# Patient Record
Sex: MALE | Race: WHITE | NOT HISPANIC OR LATINO
[De-identification: names, ages, dates, MRNs, and addresses within clinical notes are randomized per-mention and may not be internally consistent; named-entity substitution may affect disease eponyms.]

---

## 2017-01-11 ENCOUNTER — APPOINTMENT (OUTPATIENT)
Dept: INTERNAL MEDICINE | Facility: CLINIC | Age: 82
End: 2017-01-11

## 2017-01-11 VITALS
WEIGHT: 167 LBS | HEART RATE: 101 BPM | TEMPERATURE: 98.1 F | DIASTOLIC BLOOD PRESSURE: 77 MMHG | SYSTOLIC BLOOD PRESSURE: 116 MMHG | HEIGHT: 65 IN | BODY MASS INDEX: 27.82 KG/M2

## 2017-01-12 ENCOUNTER — INPATIENT (INPATIENT)
Facility: HOSPITAL | Age: 82
LOS: 2 days | Discharge: HOME | DRG: 195 | End: 2017-01-15
Attending: INTERNAL MEDICINE | Admitting: HOSPITALIST
Payer: MEDICARE

## 2017-01-12 ENCOUNTER — RESULT CHARGE (OUTPATIENT)
Age: 82
End: 2017-01-12

## 2017-01-12 VITALS
TEMPERATURE: 102 F | DIASTOLIC BLOOD PRESSURE: 78 MMHG | HEART RATE: 98 BPM | OXYGEN SATURATION: 92 % | RESPIRATION RATE: 22 BRPM | SYSTOLIC BLOOD PRESSURE: 125 MMHG

## 2017-01-12 DIAGNOSIS — J18.9 PNEUMONIA, UNSPECIFIED ORGANISM: ICD-10-CM

## 2017-01-12 DIAGNOSIS — Z98.41 CATARACT EXTRACTION STATUS, RIGHT EYE: Chronic | ICD-10-CM

## 2017-01-12 LAB
ALBUMIN SERPL ELPH-MCNC: 3.6 G/DL — SIGNIFICANT CHANGE UP (ref 3.3–5)
ALP SERPL-CCNC: 70 U/L — SIGNIFICANT CHANGE UP (ref 40–120)
ALT FLD-CCNC: 30 U/L RC — SIGNIFICANT CHANGE UP (ref 10–45)
ANION GAP SERPL CALC-SCNC: 18 MMOL/L — HIGH (ref 5–17)
APTT BLD: 27.3 SEC — LOW (ref 27.5–37.4)
AST SERPL-CCNC: 42 U/L — HIGH (ref 10–40)
BASOPHILS # BLD AUTO: 0 K/UL — SIGNIFICANT CHANGE UP (ref 0–0.2)
BASOPHILS NFR BLD AUTO: 0 % — SIGNIFICANT CHANGE UP (ref 0–2)
BILIRUB SERPL-MCNC: 1.1 MG/DL — SIGNIFICANT CHANGE UP (ref 0.2–1.2)
BUN SERPL-MCNC: 23 MG/DL — SIGNIFICANT CHANGE UP (ref 7–23)
CALCIUM SERPL-MCNC: 9.3 MG/DL — SIGNIFICANT CHANGE UP (ref 8.4–10.5)
CHLORIDE SERPL-SCNC: 100 MMOL/L — SIGNIFICANT CHANGE UP (ref 96–108)
CO2 SERPL-SCNC: 21 MMOL/L — LOW (ref 22–31)
CREAT SERPL-MCNC: 1.05 MG/DL — SIGNIFICANT CHANGE UP (ref 0.5–1.3)
EOSINOPHIL # BLD AUTO: 0 K/UL — SIGNIFICANT CHANGE UP (ref 0–0.5)
EOSINOPHIL NFR BLD AUTO: 0.3 % — SIGNIFICANT CHANGE UP (ref 0–6)
FLUAV SPEC QL CULT: NEGATIVE
FLUBV AG SPEC QL IA: NEGATIVE
GAS PNL BLDV: SIGNIFICANT CHANGE UP
GLUCOSE SERPL-MCNC: 109 MG/DL — HIGH (ref 70–99)
HCT VFR BLD CALC: 33.9 % — LOW (ref 39–50)
HGB BLD-MCNC: 11.5 G/DL — LOW (ref 13–17)
INR BLD: 1.22 RATIO — HIGH (ref 0.88–1.16)
LYMPHOCYTES # BLD AUTO: 1 K/UL — SIGNIFICANT CHANGE UP (ref 1–3.3)
LYMPHOCYTES # BLD AUTO: 14.2 % — SIGNIFICANT CHANGE UP (ref 13–44)
MCHC RBC-ENTMCNC: 31.8 PG — SIGNIFICANT CHANGE UP (ref 27–34)
MCHC RBC-ENTMCNC: 33.8 GM/DL — SIGNIFICANT CHANGE UP (ref 32–36)
MCV RBC AUTO: 94.1 FL — SIGNIFICANT CHANGE UP (ref 80–100)
MONOCYTES # BLD AUTO: 0.6 K/UL — SIGNIFICANT CHANGE UP (ref 0–0.9)
MONOCYTES NFR BLD AUTO: 8.6 % — SIGNIFICANT CHANGE UP (ref 2–14)
NEUTROPHILS # BLD AUTO: 5.4 K/UL — SIGNIFICANT CHANGE UP (ref 1.8–7.4)
NEUTROPHILS NFR BLD AUTO: 76.8 % — SIGNIFICANT CHANGE UP (ref 43–77)
PLATELET # BLD AUTO: 166 K/UL — SIGNIFICANT CHANGE UP (ref 150–400)
POTASSIUM SERPL-MCNC: 4 MMOL/L — SIGNIFICANT CHANGE UP (ref 3.5–5.3)
POTASSIUM SERPL-SCNC: 4 MMOL/L — SIGNIFICANT CHANGE UP (ref 3.5–5.3)
PROT SERPL-MCNC: 7.7 G/DL — SIGNIFICANT CHANGE UP (ref 6–8.3)
PROTHROM AB SERPL-ACNC: 13.3 SEC — HIGH (ref 10–13.1)
RAPID RVP RESULT: SIGNIFICANT CHANGE UP
RBC # BLD: 3.61 M/UL — LOW (ref 4.2–5.8)
RBC # FLD: 12.4 % — SIGNIFICANT CHANGE UP (ref 10.3–14.5)
S PYO AG SPEC QL IA: NEGATIVE
SODIUM SERPL-SCNC: 139 MMOL/L — SIGNIFICANT CHANGE UP (ref 135–145)
WBC # BLD: 7 K/UL — SIGNIFICANT CHANGE UP (ref 3.8–10.5)
WBC # FLD AUTO: 7 K/UL — SIGNIFICANT CHANGE UP (ref 3.8–10.5)

## 2017-01-12 PROCEDURE — 93010 ELECTROCARDIOGRAM REPORT: CPT

## 2017-01-12 PROCEDURE — 73630 X-RAY EXAM OF FOOT: CPT | Mod: 26,RT

## 2017-01-12 PROCEDURE — 71010: CPT | Mod: 26

## 2017-01-12 PROCEDURE — 70450 CT HEAD/BRAIN W/O DYE: CPT | Mod: 26

## 2017-01-12 PROCEDURE — 99284 EMERGENCY DEPT VISIT MOD MDM: CPT | Mod: 25,GC

## 2017-01-12 RX ORDER — ATORVASTATIN CALCIUM 80 MG/1
10 TABLET, FILM COATED ORAL AT BEDTIME
Qty: 0 | Refills: 0 | Status: DISCONTINUED | OUTPATIENT
Start: 2017-01-12 | End: 2017-01-15

## 2017-01-12 RX ORDER — ASPIRIN/CALCIUM CARB/MAGNESIUM 324 MG
81 TABLET ORAL DAILY
Qty: 0 | Refills: 0 | Status: DISCONTINUED | OUTPATIENT
Start: 2017-01-12 | End: 2017-01-15

## 2017-01-12 RX ORDER — SODIUM CHLORIDE 9 MG/ML
2000 INJECTION INTRAMUSCULAR; INTRAVENOUS; SUBCUTANEOUS ONCE
Qty: 0 | Refills: 0 | Status: COMPLETED | OUTPATIENT
Start: 2017-01-12 | End: 2017-01-12

## 2017-01-12 RX ORDER — AZTREONAM 2 G
2000 VIAL (EA) INJECTION ONCE
Qty: 0 | Refills: 0 | Status: COMPLETED | OUTPATIENT
Start: 2017-01-12 | End: 2017-01-12

## 2017-01-12 RX ORDER — AZITHROMYCIN 500 MG/1
500 TABLET, FILM COATED ORAL ONCE
Qty: 0 | Refills: 0 | Status: COMPLETED | OUTPATIENT
Start: 2017-01-12 | End: 2017-01-12

## 2017-01-12 RX ORDER — ACETAMINOPHEN 500 MG
650 TABLET ORAL ONCE
Qty: 0 | Refills: 0 | Status: COMPLETED | OUTPATIENT
Start: 2017-01-12 | End: 2017-01-12

## 2017-01-12 RX ORDER — SODIUM CHLORIDE 9 MG/ML
3 INJECTION INTRAMUSCULAR; INTRAVENOUS; SUBCUTANEOUS ONCE
Qty: 0 | Refills: 0 | Status: COMPLETED | OUTPATIENT
Start: 2017-01-12 | End: 2017-01-12

## 2017-01-12 RX ORDER — FINASTERIDE 5 MG/1
5 TABLET, FILM COATED ORAL DAILY
Qty: 0 | Refills: 0 | Status: DISCONTINUED | OUTPATIENT
Start: 2017-01-12 | End: 2017-01-15

## 2017-01-12 RX ORDER — VALSARTAN 80 MG/1
160 TABLET ORAL DAILY
Qty: 0 | Refills: 0 | Status: DISCONTINUED | OUTPATIENT
Start: 2017-01-12 | End: 2017-01-15

## 2017-01-12 RX ADMIN — SODIUM CHLORIDE 3 MILLILITER(S): 9 INJECTION INTRAMUSCULAR; INTRAVENOUS; SUBCUTANEOUS at 19:04

## 2017-01-12 RX ADMIN — AZITHROMYCIN 250 MILLIGRAM(S): 500 TABLET, FILM COATED ORAL at 23:54

## 2017-01-12 RX ADMIN — Medication 650 MILLIGRAM(S): at 18:54

## 2017-01-12 RX ADMIN — SODIUM CHLORIDE 1000 MILLILITER(S): 9 INJECTION INTRAMUSCULAR; INTRAVENOUS; SUBCUTANEOUS at 19:04

## 2017-01-12 NOTE — ED ADULT NURSE NOTE - OBJECTIVE STATEMENT
89 y.o male c c/o weakness. Productive cough/ fatigue/ fever x 5 days. Positive nasal congestion. Flu swab neg at MDs office the other day as per pt. Fell getting OOB today. Denies NVD. Denies CP/SOB. Febrile upon arrival. A&Ox2. 89 y.o male c c/o weakness. Productive cough/ fatigue/ fever x 5 days. Positive nasal congestion. Flu swab neg at MDs office the other day as per pt. Fell getting OOB today. Denies NVD. Denies CP/SOB. Febrile upon arrival. A&Ox2. Adventitious B/S B/L.

## 2017-01-12 NOTE — ED ADULT NURSE NOTE - PMH
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

## 2017-01-12 NOTE — ED PROVIDER NOTE - OBJECTIVE STATEMENT
89M with pmh of HTN, HLD presents with productive cough, fatigue, fever x 5 days. with worsening fatigue tonight, unable to get self out of bed. +nasal congestion. this morning had fall while trying to get out of bed, struck nose, denies LOC. no n/v/d. has been taking aspirin for fever. no cp or sob. lives with wife.     was seen by pmd yesterday, prescribed nasal spray and antihistamine.     pmd: shahbaz

## 2017-01-12 NOTE — ED PROVIDER NOTE - MEDICAL DECISION MAKING DETAILS
Attending MD Sanchez: 89 male with HTN presents with fatigue cough and fall today.  Seen by PMD yesterday and give z-pack.  Fell today hitting head and hurting foot.  On exam there is diffuse crackles bilaterally, RRR, abd soft NTNT, no sign LE edema, no resp distress.  No recent hospitalizations.  RVP negative.  Will admit. Denies CP

## 2017-01-12 NOTE — ED PROVIDER NOTE - ATTENDING CONTRIBUTION TO CARE
Attending MD Sanchez:  I personally have seen and examined this patient.  Resident note reviewed and agree on plan of care and except where noted.  See MDM for details.

## 2017-01-12 NOTE — ED PROVIDER NOTE - CARE PLAN
Principal Discharge DX:	Pneumonia of both lungs due to infectious organism, unspecified part of lung

## 2017-01-12 NOTE — ED ADULT NURSE NOTE - PSH
S/P cataract surgery, right    S/P tonsillectomy    S/P TURP (status post transurethral resection of prostate)

## 2017-01-13 ENCOUNTER — TRANSCRIPTION ENCOUNTER (OUTPATIENT)
Age: 82
End: 2017-01-13

## 2017-01-13 DIAGNOSIS — N40.0 BENIGN PROSTATIC HYPERPLASIA WITHOUT LOWER URINARY TRACT SYMPTOMS: ICD-10-CM

## 2017-01-13 DIAGNOSIS — Z29.9 ENCOUNTER FOR PROPHYLACTIC MEASURES, UNSPECIFIED: ICD-10-CM
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
## 2017-01-13 DIAGNOSIS — J18.9 PNEUMONIA, UNSPECIFIED ORGANISM: ICD-10-CM

## 2017-01-13 DIAGNOSIS — I10 ESSENTIAL (PRIMARY) HYPERTENSION: ICD-10-CM

## 2017-01-13 DIAGNOSIS — S92.911A UNSPECIFIED FRACTURE OF RIGHT TOE(S), INITIAL ENCOUNTER FOR CLOSED FRACTURE: ICD-10-CM

## 2017-01-13 LAB
ALBUMIN SERPL ELPH-MCNC: 2.7 G/DL — LOW (ref 3.3–5)
ALP SERPL-CCNC: 64 U/L — SIGNIFICANT CHANGE UP (ref 40–120)
ALT FLD-CCNC: 33 U/L — SIGNIFICANT CHANGE UP (ref 10–45)
ANION GAP SERPL CALC-SCNC: 13 MMOL/L — SIGNIFICANT CHANGE UP (ref 5–17)
APPEARANCE UR: CLEAR — SIGNIFICANT CHANGE UP
AST SERPL-CCNC: 41 U/L — HIGH (ref 10–40)
BASOPHILS # BLD AUTO: 0.01 K/UL — SIGNIFICANT CHANGE UP (ref 0–0.2)
BASOPHILS NFR BLD AUTO: 0.2 % — SIGNIFICANT CHANGE UP (ref 0–2)
BILIRUB SERPL-MCNC: 0.9 MG/DL — SIGNIFICANT CHANGE UP (ref 0.2–1.2)
BILIRUB UR-MCNC: NEGATIVE — SIGNIFICANT CHANGE UP
BUN SERPL-MCNC: 24 MG/DL — HIGH (ref 7–23)
CALCIUM SERPL-MCNC: 8.4 MG/DL — SIGNIFICANT CHANGE UP (ref 8.4–10.5)
CHLORIDE SERPL-SCNC: 105 MMOL/L — SIGNIFICANT CHANGE UP (ref 96–108)
CHOLEST SERPL-MCNC: 92 MG/DL — SIGNIFICANT CHANGE UP (ref 10–199)
CO2 SERPL-SCNC: 21 MMOL/L — LOW (ref 22–31)
COLOR SPEC: YELLOW — SIGNIFICANT CHANGE UP
CREAT SERPL-MCNC: 0.99 MG/DL — SIGNIFICANT CHANGE UP (ref 0.5–1.3)
DIFF PNL FLD: NEGATIVE — SIGNIFICANT CHANGE UP
EOSINOPHIL # BLD AUTO: 0.03 K/UL — SIGNIFICANT CHANGE UP (ref 0–0.5)
EOSINOPHIL NFR BLD AUTO: 0.5 % — SIGNIFICANT CHANGE UP (ref 0–6)
GLUCOSE SERPL-MCNC: 116 MG/DL — HIGH (ref 70–99)
GLUCOSE UR QL: NEGATIVE — SIGNIFICANT CHANGE UP
HCT VFR BLD CALC: 29.1 % — LOW (ref 39–50)
HCT VFR BLD CALC: 33.2 % — LOW (ref 39–50)
HDLC SERPL-MCNC: 25 MG/DL — LOW (ref 40–125)
HGB BLD-MCNC: 11.3 G/DL — LOW (ref 13–17)
HGB BLD-MCNC: 9.6 G/DL — LOW (ref 13–17)
IMM GRANULOCYTES NFR BLD AUTO: 0.3 % — SIGNIFICANT CHANGE UP (ref 0–1.5)
KETONES UR-MCNC: NEGATIVE — SIGNIFICANT CHANGE UP
LEUKOCYTE ESTERASE UR-ACNC: NEGATIVE — SIGNIFICANT CHANGE UP
LIPID PNL WITH DIRECT LDL SERPL: 56 MG/DL — SIGNIFICANT CHANGE UP
LYMPHOCYTES # BLD AUTO: 0.55 K/UL — LOW (ref 1–3.3)
LYMPHOCYTES # BLD AUTO: 9.5 % — LOW (ref 13–44)
MAGNESIUM SERPL-MCNC: 1.8 MG/DL — SIGNIFICANT CHANGE UP (ref 1.6–2.6)
MCHC RBC-ENTMCNC: 31 PG — SIGNIFICANT CHANGE UP (ref 27–34)
MCHC RBC-ENTMCNC: 32.7 PG — SIGNIFICANT CHANGE UP (ref 27–34)
MCHC RBC-ENTMCNC: 33 GM/DL — SIGNIFICANT CHANGE UP (ref 32–36)
MCHC RBC-ENTMCNC: 34 GM/DL — SIGNIFICANT CHANGE UP (ref 32–36)
MCV RBC AUTO: 93.9 FL — SIGNIFICANT CHANGE UP (ref 80–100)
MCV RBC AUTO: 96.1 FL — SIGNIFICANT CHANGE UP (ref 80–100)
MONOCYTES # BLD AUTO: 0.59 K/UL — SIGNIFICANT CHANGE UP (ref 0–0.9)
MONOCYTES NFR BLD AUTO: 10.2 % — SIGNIFICANT CHANGE UP (ref 2–14)
NEUTROPHILS # BLD AUTO: 4.56 K/UL — SIGNIFICANT CHANGE UP (ref 1.8–7.4)
NEUTROPHILS NFR BLD AUTO: 79.3 % — HIGH (ref 43–77)
NITRITE UR-MCNC: NEGATIVE — SIGNIFICANT CHANGE UP
PH UR: 6 — SIGNIFICANT CHANGE UP (ref 4.8–8)
PHOSPHATE SERPL-MCNC: 2.6 MG/DL — SIGNIFICANT CHANGE UP (ref 2.5–4.5)
PLATELET # BLD AUTO: 144 K/UL — LOW (ref 150–400)
PLATELET # BLD AUTO: 161 K/UL — SIGNIFICANT CHANGE UP (ref 150–400)
POTASSIUM SERPL-MCNC: 3.9 MMOL/L — SIGNIFICANT CHANGE UP (ref 3.5–5.3)
POTASSIUM SERPL-SCNC: 3.9 MMOL/L — SIGNIFICANT CHANGE UP (ref 3.5–5.3)
PROT SERPL-MCNC: 6.1 G/DL — SIGNIFICANT CHANGE UP (ref 6–8.3)
PROT UR-MCNC: 100 MG/DL
RBC # BLD: 3.1 M/UL — LOW (ref 4.2–5.8)
RBC # BLD: 3.46 M/UL — LOW (ref 4.2–5.8)
RBC # FLD: 12.9 % — SIGNIFICANT CHANGE UP (ref 10.3–14.5)
RBC # FLD: 14.2 % — SIGNIFICANT CHANGE UP (ref 10.3–14.5)
SODIUM SERPL-SCNC: 139 MMOL/L — SIGNIFICANT CHANGE UP (ref 135–145)
SP GR SPEC: 1.03 — HIGH (ref 1.01–1.02)
TOTAL CHOLESTEROL/HDL RATIO MEASUREMENT: 3.7 RATIO — SIGNIFICANT CHANGE UP (ref 3.4–9.6)
TRIGL SERPL-MCNC: 53 MG/DL — SIGNIFICANT CHANGE UP (ref 10–149)
UROBILINOGEN FLD QL: NEGATIVE — SIGNIFICANT CHANGE UP
WBC # BLD: 5.76 K/UL — SIGNIFICANT CHANGE UP (ref 3.8–10.5)
WBC # BLD: 7 K/UL — SIGNIFICANT CHANGE UP (ref 3.8–10.5)
WBC # FLD AUTO: 5.76 K/UL — SIGNIFICANT CHANGE UP (ref 3.8–10.5)
WBC # FLD AUTO: 7 K/UL — SIGNIFICANT CHANGE UP (ref 3.8–10.5)

## 2017-01-13 PROCEDURE — 99223 1ST HOSP IP/OBS HIGH 75: CPT

## 2017-01-13 PROCEDURE — 99233 SBSQ HOSP IP/OBS HIGH 50: CPT

## 2017-01-13 RX ORDER — LACTOBACILLUS ACIDOPHILUS 100MM CELL
1 CAPSULE ORAL
Qty: 0 | Refills: 0 | COMMUNITY
Start: 2017-01-13 | End: 2017-01-27

## 2017-01-13 RX ORDER — ACETAMINOPHEN 500 MG
500 TABLET ORAL
Qty: 0 | Refills: 0 | Status: DISCONTINUED | OUTPATIENT
Start: 2017-01-13 | End: 2017-01-15

## 2017-01-13 RX ORDER — AZTREONAM 2 G
2000 VIAL (EA) INJECTION EVERY 8 HOURS
Qty: 0 | Refills: 0 | Status: DISCONTINUED | OUTPATIENT
Start: 2017-01-13 | End: 2017-01-13

## 2017-01-13 RX ORDER — ONDANSETRON 8 MG/1
4 TABLET, FILM COATED ORAL ONCE
Qty: 0 | Refills: 0 | Status: COMPLETED | OUTPATIENT
Start: 2017-01-13 | End: 2017-01-13

## 2017-01-13 RX ORDER — LACTOBACILLUS ACIDOPHILUS 100MM CELL
1 CAPSULE ORAL DAILY
Qty: 0 | Refills: 0 | Status: DISCONTINUED | OUTPATIENT
Start: 2017-01-13 | End: 2017-01-15

## 2017-01-13 RX ORDER — LACTOBACILLUS ACIDOPHILUS 100MM CELL
1 CAPSULE ORAL
Qty: 0 | Refills: 0 | COMMUNITY
Start: 2017-01-13 | End: 2017-01-19

## 2017-01-13 RX ORDER — ACETAMINOPHEN 500 MG
650 TABLET ORAL ONCE
Qty: 0 | Refills: 0 | Status: COMPLETED | OUTPATIENT
Start: 2017-01-13 | End: 2017-01-13

## 2017-01-13 RX ORDER — ENOXAPARIN SODIUM 100 MG/ML
40 INJECTION SUBCUTANEOUS EVERY 24 HOURS
Qty: 0 | Refills: 0 | Status: DISCONTINUED | OUTPATIENT
Start: 2017-01-13 | End: 2017-01-15

## 2017-01-13 RX ADMIN — Medication 81 MILLIGRAM(S): at 11:40

## 2017-01-13 RX ADMIN — Medication 100 MILLIGRAM(S): at 02:15

## 2017-01-13 RX ADMIN — Medication 650 MILLIGRAM(S): at 00:58

## 2017-01-13 RX ADMIN — FINASTERIDE 5 MILLIGRAM(S): 5 TABLET, FILM COATED ORAL at 11:39

## 2017-01-13 RX ADMIN — VALSARTAN 160 MILLIGRAM(S): 80 TABLET ORAL at 11:39

## 2017-01-13 RX ADMIN — Medication 1 TABLET(S): at 22:08

## 2017-01-13 RX ADMIN — Medication 100 MILLIGRAM(S): at 11:39

## 2017-01-13 RX ADMIN — ATORVASTATIN CALCIUM 10 MILLIGRAM(S): 80 TABLET, FILM COATED ORAL at 22:07

## 2017-01-13 RX ADMIN — ONDANSETRON 4 MILLIGRAM(S): 8 TABLET, FILM COATED ORAL at 18:09

## 2017-01-13 RX ADMIN — ENOXAPARIN SODIUM 40 MILLIGRAM(S): 100 INJECTION SUBCUTANEOUS at 06:45

## 2017-01-13 RX ADMIN — Medication 650 MILLIGRAM(S): at 02:16

## 2017-01-13 NOTE — H&P ADULT. - PROBLEM SELECTOR PLAN 2
mechanical fall  CT brain negative  XR foot shows right 1st toe fracture  podiatry consulted  PT consult  pain control with tylenol

## 2017-01-13 NOTE — H&P ADULT. - MUSCULOSKELETAL
details… detailed exam ROM intact/+tenderness to palpation right 1st toe.  no erythema, no signs of cellulitis/normal

## 2017-01-13 NOTE — DISCHARGE NOTE ADULT - PATIENT PORTAL LINK FT
“You can access the Follow[facility] Patient Portal, offered by [facility], by registering with the following website: [email or web address]”

## 2017-01-13 NOTE — H&P ADULT. - HISTORY OF PRESENT ILLNESS
89 yo male with PMH of HTN, HLD, BPH presents after fall x 2 in last two days.  Patient states that he has been feeling weak and not been eating well for few days.  Two days ago, he went to his PCP, who diagnosed patient with bronchitis and prescribed azithromycin.  Patient fell off the bed yesterday as he was trying to get out of bed.  He was on the floor for 4 hours as no one else was home at that time.  He did not lose consciousness.  Patient states his bed mattress was then shifted from top floor to basement where his wife sleeps.  He again fell this morning as he was trying to get out of bed.  He fell on his nose and right foot.  He may have had head trauma, but did not lose consciousness.  Therefore, his son brought in to hospital for further evaluation.   Patient states he has not been feeling well.  He has been coughing for weeks.  Does not produce any phlegm.  He has no fever, no sick contact, no recent travel.  In ED, patient was found to have a fever of 101.8.  CXR shows multifocal pneumonia.  He was prescribed aztreonam and azithromycin.

## 2017-01-13 NOTE — H&P ADULT. - PMH
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

## 2017-01-13 NOTE — DISCHARGE NOTE ADULT - CARE PROVIDERS DIRECT ADDRESSES
,DirectAddress_Unknown,[email or web address] ,DirectAddress_Unknown,DirectAddress_Unknown,[email or web address]

## 2017-01-13 NOTE — DISCHARGE NOTE ADULT - PLAN OF CARE
IMPRESSION: Foot X-ray 1.  Cortical irregularity at the medial aspect of the first proximal   phalanx suggestive of a minimally displaced fracture. 2.  Hallux valgus deformity with hallux rigidus of the first MTP joint. Completion of antibiotics Coughing and fever of 101.1 on admission raised concerns for possible PNA. Chest x-ray showed:  Bilateral patchy opacities, more prominent on the right. Findings are compatible with asymmetric pulmonary edema versus multifocal pneumonia. Low suspicion for pulmonary edema. Intravenous antibiotics were started which a significant clinical improvement. Physical therapy was consulted they REC>>>>>>>>> Controlled through out hospitalization. Home medication Diovan was continued. Fall out of bed likely etiology. X-ray of right foot showed :Cortical irregularity at the medial aspect of the first proximal phalanx suggestive of a minimally displaced fracture. 2.  Hallux valgus deformity with hallux rigidus of the first MTP joint. Podiatry was consulted. A surgical boot was recommended. Physical therapy was consulted they REC home with rolling walker, home PT

## 2017-01-13 NOTE — DISCHARGE NOTE ADULT - ADDITIONAL INSTRUCTIONS
Follow up with Dr. Tsai (podiatry) within 1 week of Discharge.  Call [phone] to make apt. Follow up with Dr. Tsai (podiatry) within 1 week of Discharge.  Call [phone] to make apt.  Follow up with Dr. Alford (cardiology) within 1 week of discharge.

## 2017-01-13 NOTE — DISCHARGE NOTE ADULT - MEDICATION SUMMARY - MEDICATIONS TO TAKE
I will START or STAY ON the medications listed below when I get home from the hospital:    Proscar 5 mg oral tablet  -- 1 tab(s) by mouth once a day  -- Indication: For BPH (benign prostatic hyperplasia)    aspirin 81 mg oral tablet  -- 1 tab(s) by mouth once a day  -- Indication: For coronary artery disease     Diovan 160 mg oral capsule  -- 1 tab(s) by mouth once a day  -- Indication: For HTN (hypertension)    Lipitor 10 mg oral tablet  -- 1 tab(s) by mouth once a day (at bedtime)  -- Indication: For Hyperlipidemia    lactobacillus acidophilus oral capsule  -- 1  by mouth once a day  -- Indication: For Probiotic     levoFLOXacin 750 mg oral tablet  -- 1 tab(s) by mouth every 48 hours  take 1/16 and 1/18  -- Indication: For antibiotic     multivitamin  --   once a day last dose 8/4/14  -- Indication: For supplement     cholecalciferol oral tablet  -- 1000 unit(s) by mouth once a day  -- Indication: For supplement

## 2017-01-13 NOTE — H&P ADULT. - RADIOLOGY RESULTS AND INTERPRETATION
CXR personally reviewed: b/l patchy opacities; ?multivocal pneumonia  CT head reviewed: no intracranial abnormalities  XR foot reviewed: minimally displaced fracture of right 1st toe

## 2017-01-13 NOTE — DISCHARGE NOTE ADULT - HOSPITAL COURSE
89 yo male with PMH of HTN, HLD, BPH presents after few days of weakness fall x 2 in last two days.      Pneumonia of both lungs due to infectious organism, unspecified part of lung.  check echocardiogram  s/p azithromycin, aztreonam;  will continue with aztreonam and levquin for now  f/u blood culture  check sputum culture  repeat CXR in 1-2 daysToe fracture  HTN (hypertension). 89 yo male with PMH of HTN, HLD, BPH presents after few days of weakness fall x 2 in last two days.  Found to have pneumonia, treated with levaquin, remained afebrile with stable respiratory status.  Found to have R toe fracture, evaluated by podiatry who recommended surgical boot, PT, outpatient follow up with podiatry.  Evaluated by PT who recommended home with rolling walker and PT, discussed with case management who set up home PT.  As part of evaluation had TTE which showed EF 43% with some hypokinetic segments.  Discussed these results with patient and son at bedside.  Son and patient deny any history of cardiac disease, chest pain, dyspnea on exertion.  I recommended inpatient cardiac work up but son declined.  Son understands risks of taking patient home.  Will take patient home and follow up with a cardiologist this week for further evaluation.  Given number for Dr. Alford to call and make an appointment.

## 2017-01-13 NOTE — H&P ADULT. - LAB RESULTS AND INTERPRETATION
Labs personally reviewed and interpreted:  no leukocytosis, Hb 11.5, platelet 166  Sodium 139, potassium 4.0, chloride 109, bicarb 21, BUN 23, creatinine 1.05, glucose 109  , troponin 0.01, CKMB 2.2  RVP negative Labs personally reviewed and interpreted:  no leukocytosis, Hb 11.5, platelet 166  Sodium 139, potassium 4.0, chloride 109, bicarb 21, BUN 23, creatinine 1.05, glucose 109  , troponin 0.01, CKMB 2.2; pBNP 3871  RVP negative

## 2017-01-13 NOTE — DISCHARGE NOTE ADULT - CARE PROVIDER_API CALL
Nilsa Taylor (NP; RN), NP in Adult Health  [address] [place] [address]  Phone: [phone]  Fax: [phone] Nilsa Taylor (NP; RN), NP in Adult Health  [address] [place] [address]  Phone: [phone]  Fax: [phone]    Ivan Alford), Internal Medicine  [address] [place] [address]  Phone: [phone]  Fax: [phone]    Eric Soni), Gastroenterology; Internal Medicine  [address] [place] [address]  Phone: [phone]  Fax: [phone]

## 2017-01-13 NOTE — INPATIENT CERTIFICATION FOR MEDICARE PATIENTS - RISKS OF ADVERSE EVENTS
Concern for delay in diagnosis and treatment/Concern for worsening infectious process/Concern for neurologic deterioration

## 2017-01-13 NOTE — DISCHARGE NOTE ADULT - CARE PLAN
Principal Discharge DX:	Multifocal pneumonia  Secondary Diagnosis:	Toe fracture, right  Instructions for follow-up, activity and diet:	IMPRESSION: Foot X-ray 1.  Cortical irregularity at the medial aspect of the first proximal   phalanx suggestive of a minimally displaced fracture. 2.  Hallux valgus deformity with hallux rigidus of the first MTP joint.  Secondary Diagnosis:	Gait disorder  Secondary Diagnosis:	HTN (hypertension) Principal Discharge DX:	Multifocal pneumonia  Goal:	Completion of antibiotics  Instructions for follow-up, activity and diet:	Coughing and fever of 101.1 on admission raised concerns for possible PNA. Chest x-ray showed:  Bilateral patchy opacities, more prominent on the right. Findings are compatible with asymmetric pulmonary edema versus multifocal pneumonia. Low suspicion for pulmonary edema. Intravenous antibiotics were started which a significant clinical improvement.  Secondary Diagnosis:	Toe fracture, right  Instructions for follow-up, activity and diet:	Fall out of bed likely etiology. X-ray of right foot showed :Cortical irregularity at the medial aspect of the first proximal phalanx suggestive of a minimally displaced fracture. 2.  Hallux valgus deformity with hallux rigidus of the first MTP joint. Podiatry was consulted. A surgical boot was recommended.  Secondary Diagnosis:	Gait disorder  Instructions for follow-up, activity and diet:	Physical therapy was consulted they REC>>>>>>>>>  Secondary Diagnosis:	HTN (hypertension)  Instructions for follow-up, activity and diet:	Controlled through out hospitalization. Home medication Diovan was continued. Principal Discharge DX:	Multifocal pneumonia  Goal:	Completion of antibiotics  Instructions for follow-up, activity and diet:	Coughing and fever of 101.1 on admission raised concerns for possible PNA. Chest x-ray showed:  Bilateral patchy opacities, more prominent on the right. Findings are compatible with asymmetric pulmonary edema versus multifocal pneumonia. Low suspicion for pulmonary edema. Intravenous antibiotics were started which a significant clinical improvement.  Secondary Diagnosis:	Toe fracture, right  Instructions for follow-up, activity and diet:	Fall out of bed likely etiology. X-ray of right foot showed :Cortical irregularity at the medial aspect of the first proximal phalanx suggestive of a minimally displaced fracture. 2.  Hallux valgus deformity with hallux rigidus of the first MTP joint. Podiatry was consulted. A surgical boot was recommended.  Secondary Diagnosis:	Gait disorder  Instructions for follow-up, activity and diet:	Physical therapy was consulted they REC home with rolling walker, home PT  Secondary Diagnosis:	HTN (hypertension)  Instructions for follow-up, activity and diet:	Controlled through out hospitalization. Home medication Diovan was continued.

## 2017-01-13 NOTE — H&P ADULT. - ASSESSMENT
89 yo male with PMH of HTN, HLD, BPH presents after fall x 2 in last two days. 89 yo male with PMH of HTN, HLD, BPH presents after few days of weakness fall x 2 in last two days.

## 2017-01-13 NOTE — H&P ADULT. - NEUROLOGICAL DETAILS
responds to pain/cranial nerves intact/responds to verbal commands/alert and oriented x 2 (likely baseline)

## 2017-01-13 NOTE — DISCHARGE NOTE ADULT - MEDICATION SUMMARY - MEDICATIONS TO STOP TAKING
I will STOP taking the medications listed below when I get home from the hospital:    Cozaar 50 mg oral tablet  -- 1 tab(s) by mouth once a day    Macrobid 100 mg oral capsule  -- 1 cap(s) by mouth 2 times a day

## 2017-01-14 LAB
24R-OH-CALCIDIOL SERPL-MCNC: 27.5 NG/ML — LOW (ref 30–100)
ANION GAP SERPL CALC-SCNC: 13 MMOL/L — SIGNIFICANT CHANGE UP (ref 5–17)
BASOPHILS # BLD AUTO: 0 K/UL — SIGNIFICANT CHANGE UP (ref 0–0.2)
BASOPHILS NFR BLD AUTO: 0.2 % — SIGNIFICANT CHANGE UP (ref 0–2)
BUN SERPL-MCNC: 24 MG/DL — HIGH (ref 7–23)
CALCIUM SERPL-MCNC: 9 MG/DL — SIGNIFICANT CHANGE UP (ref 8.4–10.5)
CHLORIDE SERPL-SCNC: 104 MMOL/L — SIGNIFICANT CHANGE UP (ref 96–108)
CK SERPL-CCNC: 343 U/L — HIGH (ref 30–200)
CO2 SERPL-SCNC: 22 MMOL/L — SIGNIFICANT CHANGE UP (ref 22–31)
CREAT SERPL-MCNC: 0.88 MG/DL — SIGNIFICANT CHANGE UP (ref 0.5–1.3)
CULTURE RESULTS: SIGNIFICANT CHANGE UP
EOSINOPHIL # BLD AUTO: 0.2 K/UL — SIGNIFICANT CHANGE UP (ref 0–0.5)
EOSINOPHIL NFR BLD AUTO: 3.9 % — SIGNIFICANT CHANGE UP (ref 0–6)
FOLATE SERPL-MCNC: 19.5 NG/ML — SIGNIFICANT CHANGE UP (ref 4.8–24.2)
GLUCOSE SERPL-MCNC: 94 MG/DL — SIGNIFICANT CHANGE UP (ref 70–99)
HCT VFR BLD CALC: 30.3 % — LOW (ref 39–50)
HGB BLD-MCNC: 10.2 G/DL — LOW (ref 13–17)
INR BLD: 1.29 RATIO — HIGH (ref 0.88–1.16)
LYMPHOCYTES # BLD AUTO: 1.3 K/UL — SIGNIFICANT CHANGE UP (ref 1–3.3)
LYMPHOCYTES # BLD AUTO: 21 % — SIGNIFICANT CHANGE UP (ref 13–44)
MCHC RBC-ENTMCNC: 31.8 PG — SIGNIFICANT CHANGE UP (ref 27–34)
MCHC RBC-ENTMCNC: 33.5 GM/DL — SIGNIFICANT CHANGE UP (ref 32–36)
MCV RBC AUTO: 94.9 FL — SIGNIFICANT CHANGE UP (ref 80–100)
MONOCYTES # BLD AUTO: 0.5 K/UL — SIGNIFICANT CHANGE UP (ref 0–0.9)
MONOCYTES NFR BLD AUTO: 8.3 % — SIGNIFICANT CHANGE UP (ref 2–14)
NEUTROPHILS # BLD AUTO: 4.2 K/UL — SIGNIFICANT CHANGE UP (ref 1.8–7.4)
NEUTROPHILS NFR BLD AUTO: 66.6 % — SIGNIFICANT CHANGE UP (ref 43–77)
PLATELET # BLD AUTO: 169 K/UL — SIGNIFICANT CHANGE UP (ref 150–400)
POTASSIUM SERPL-MCNC: 3.9 MMOL/L — SIGNIFICANT CHANGE UP (ref 3.5–5.3)
POTASSIUM SERPL-SCNC: 3.9 MMOL/L — SIGNIFICANT CHANGE UP (ref 3.5–5.3)
PROTHROM AB SERPL-ACNC: 14 SEC — HIGH (ref 10–13.1)
RBC # BLD: 3.2 M/UL — LOW (ref 4.2–5.8)
RBC # FLD: 12.5 % — SIGNIFICANT CHANGE UP (ref 10.3–14.5)
SODIUM SERPL-SCNC: 139 MMOL/L — SIGNIFICANT CHANGE UP (ref 135–145)
SPECIMEN SOURCE: SIGNIFICANT CHANGE UP
T4 FREE+ TSH PNL SERPL: 4.53 UIU/ML — HIGH (ref 0.27–4.2)
VIT B12 SERPL-MCNC: 251 PG/ML — SIGNIFICANT CHANGE UP (ref 243–894)
WBC # BLD: 6.2 K/UL — SIGNIFICANT CHANGE UP (ref 3.8–10.5)
WBC # FLD AUTO: 6.2 K/UL — SIGNIFICANT CHANGE UP (ref 3.8–10.5)

## 2017-01-14 PROCEDURE — 99232 SBSQ HOSP IP/OBS MODERATE 35: CPT

## 2017-01-14 PROCEDURE — 93306 TTE W/DOPPLER COMPLETE: CPT | Mod: 26

## 2017-01-14 PROCEDURE — 71010: CPT | Mod: 26

## 2017-01-14 RX ORDER — CHOLECALCIFEROL (VITAMIN D3) 125 MCG
1000 CAPSULE ORAL DAILY
Qty: 0 | Refills: 0 | Status: DISCONTINUED | OUTPATIENT
Start: 2017-01-14 | End: 2017-01-15

## 2017-01-14 RX ADMIN — FINASTERIDE 5 MILLIGRAM(S): 5 TABLET, FILM COATED ORAL at 12:56

## 2017-01-14 RX ADMIN — ATORVASTATIN CALCIUM 10 MILLIGRAM(S): 80 TABLET, FILM COATED ORAL at 21:26

## 2017-01-14 RX ADMIN — Medication 81 MILLIGRAM(S): at 12:56

## 2017-01-14 RX ADMIN — VALSARTAN 160 MILLIGRAM(S): 80 TABLET ORAL at 12:57

## 2017-01-14 RX ADMIN — Medication 1 TABLET(S): at 12:57

## 2017-01-14 RX ADMIN — ENOXAPARIN SODIUM 40 MILLIGRAM(S): 100 INJECTION SUBCUTANEOUS at 05:53

## 2017-01-14 NOTE — PHYSICAL THERAPY INITIAL EVALUATION ADULT - ACTIVE RANGE OF MOTION EXAMINATION, REHAB EVAL
no Active ROM deficits were identified bilateral  lower extremity Active ROM was WFL (within functional limits)/bilateral upper extremity Active ROM was WFL (within functional limits)

## 2017-01-14 NOTE — PHYSICAL THERAPY INITIAL EVALUATION ADULT - PLANNED THERAPY INTERVENTIONS, PT EVAL
transfer training/bed mobility training/balance training/gait training/strengthening/postural re-education

## 2017-01-14 NOTE — PHYSICAL THERAPY INITIAL EVALUATION ADULT - LEVEL OF INDEPENDENCE: SIT/STAND, REHAB EVAL
deferred at this time as post op shoe has not been dispensed to pt yet deferred at this time as post op shoe has not been dispensed to pt yet/supervision

## 2017-01-14 NOTE — PHYSICAL THERAPY INITIAL EVALUATION ADULT - ADDITIONAL COMMENTS
(HPI cont.) He was on the floor for 4 hours as no one else was home at that time.  He did not lose consciousness. He again fell this morning as he was trying to get out of bed.  He fell on his nose and right foot. X-Ray R foot + for fx of 1st prox phalynx. Chest X-Ray + for multifocal PNA.    Pt reports living in a private home with spouse. He states that family lives close by and comes over frequently. Pt has 7 [address] and 11 steps to bedroom.

## 2017-01-14 NOTE — PHYSICAL THERAPY INITIAL EVALUATION ADULT - DISCHARGE DISPOSITION, PT EVAL
TBD pending further functional assessment home w/ assist/Home PT for strengthening, transfer, gait and balance training/home w/ home PT

## 2017-01-14 NOTE — PHYSICAL THERAPY INITIAL EVALUATION ADULT - PERTINENT HX OF CURRENT PROBLEM, REHAB EVAL
presents after fall x 2 in last two days.  Patient states that he has been feeling weak and not been eating well for few days.  Two days ago, he went to his PCP, who diagnosed patient with bronchitis and prescribed azithromycin.  Patient fell off the bed yesterday as he was trying to get out of bed.

## 2017-01-14 NOTE — PHYSICAL THERAPY INITIAL EVALUATION ADULT - NS ASR WT BEARING DETAIL RLE
as per Dr. Tsai consult in pt chart, "amb. as tolerated" "pt may ambulate in post op shoe"/weight-bearing as tolerated

## 2017-01-15 VITALS
RESPIRATION RATE: 17 BRPM | HEART RATE: 86 BPM | OXYGEN SATURATION: 93 % | DIASTOLIC BLOOD PRESSURE: 74 MMHG | TEMPERATURE: 98 F | SYSTOLIC BLOOD PRESSURE: 115 MMHG

## 2017-01-15 PROCEDURE — 85014 HEMATOCRIT: CPT

## 2017-01-15 PROCEDURE — 80061 LIPID PANEL: CPT

## 2017-01-15 PROCEDURE — 87581 M.PNEUMON DNA AMP PROBE: CPT

## 2017-01-15 PROCEDURE — 82435 ASSAY OF BLOOD CHLORIDE: CPT

## 2017-01-15 PROCEDURE — 70450 CT HEAD/BRAIN W/O DYE: CPT

## 2017-01-15 PROCEDURE — 97161 PT EVAL LOW COMPLEX 20 MIN: CPT

## 2017-01-15 PROCEDURE — 87040 BLOOD CULTURE FOR BACTERIA: CPT

## 2017-01-15 PROCEDURE — 87633 RESP VIRUS 12-25 TARGETS: CPT

## 2017-01-15 PROCEDURE — 82746 ASSAY OF FOLIC ACID SERUM: CPT

## 2017-01-15 PROCEDURE — 82550 ASSAY OF CK (CPK): CPT

## 2017-01-15 PROCEDURE — 71045 X-RAY EXAM CHEST 1 VIEW: CPT

## 2017-01-15 PROCEDURE — 93005 ELECTROCARDIOGRAM TRACING: CPT

## 2017-01-15 PROCEDURE — 84100 ASSAY OF PHOSPHORUS: CPT

## 2017-01-15 PROCEDURE — 84484 ASSAY OF TROPONIN QUANT: CPT

## 2017-01-15 PROCEDURE — 84132 ASSAY OF SERUM POTASSIUM: CPT

## 2017-01-15 PROCEDURE — 87486 CHLMYD PNEUM DNA AMP PROBE: CPT

## 2017-01-15 PROCEDURE — 93306 TTE W/DOPPLER COMPLETE: CPT

## 2017-01-15 PROCEDURE — 82553 CREATINE MB FRACTION: CPT

## 2017-01-15 PROCEDURE — 81001 URINALYSIS AUTO W/SCOPE: CPT

## 2017-01-15 PROCEDURE — 84439 ASSAY OF FREE THYROXINE: CPT

## 2017-01-15 PROCEDURE — 83605 ASSAY OF LACTIC ACID: CPT

## 2017-01-15 PROCEDURE — 85027 COMPLETE CBC AUTOMATED: CPT

## 2017-01-15 PROCEDURE — 73630 X-RAY EXAM OF FOOT: CPT

## 2017-01-15 PROCEDURE — 80053 COMPREHEN METABOLIC PANEL: CPT

## 2017-01-15 PROCEDURE — 85730 THROMBOPLASTIN TIME PARTIAL: CPT

## 2017-01-15 PROCEDURE — 83735 ASSAY OF MAGNESIUM: CPT

## 2017-01-15 PROCEDURE — 82607 VITAMIN B-12: CPT

## 2017-01-15 PROCEDURE — 99239 HOSP IP/OBS DSCHRG MGMT >30: CPT

## 2017-01-15 PROCEDURE — 87086 URINE CULTURE/COLONY COUNT: CPT

## 2017-01-15 PROCEDURE — 82803 BLOOD GASES ANY COMBINATION: CPT

## 2017-01-15 PROCEDURE — 83880 ASSAY OF NATRIURETIC PEPTIDE: CPT

## 2017-01-15 PROCEDURE — 87798 DETECT AGENT NOS DNA AMP: CPT

## 2017-01-15 PROCEDURE — 82306 VITAMIN D 25 HYDROXY: CPT

## 2017-01-15 PROCEDURE — 82330 ASSAY OF CALCIUM: CPT

## 2017-01-15 PROCEDURE — 99285 EMERGENCY DEPT VISIT HI MDM: CPT | Mod: 25

## 2017-01-15 PROCEDURE — 84295 ASSAY OF SERUM SODIUM: CPT

## 2017-01-15 PROCEDURE — 85610 PROTHROMBIN TIME: CPT

## 2017-01-15 PROCEDURE — 80048 BASIC METABOLIC PNL TOTAL CA: CPT

## 2017-01-15 PROCEDURE — 82947 ASSAY GLUCOSE BLOOD QUANT: CPT

## 2017-01-15 PROCEDURE — 84443 ASSAY THYROID STIM HORMONE: CPT

## 2017-01-15 RX ORDER — CIPROFLOXACIN LACTATE 400MG/40ML
1 VIAL (ML) INTRAVENOUS
Qty: 2 | Refills: 0 | OUTPATIENT
Start: 2017-01-15 | End: 2017-01-19

## 2017-01-15 RX ORDER — CHOLECALCIFEROL (VITAMIN D3) 125 MCG
1000 CAPSULE ORAL
Qty: 0 | Refills: 0 | COMMUNITY
Start: 2017-01-15

## 2017-01-15 RX ADMIN — FINASTERIDE 5 MILLIGRAM(S): 5 TABLET, FILM COATED ORAL at 11:40

## 2017-01-15 RX ADMIN — Medication 1000 UNIT(S): at 11:41

## 2017-01-15 RX ADMIN — VALSARTAN 160 MILLIGRAM(S): 80 TABLET ORAL at 11:41

## 2017-01-15 RX ADMIN — Medication 1 TABLET(S): at 11:40

## 2017-01-15 RX ADMIN — Medication 81 MILLIGRAM(S): at 11:40

## 2017-01-15 RX ADMIN — ENOXAPARIN SODIUM 40 MILLIGRAM(S): 100 INJECTION SUBCUTANEOUS at 05:08

## 2017-01-17 LAB
CULTURE RESULTS: SIGNIFICANT CHANGE UP
CULTURE RESULTS: SIGNIFICANT CHANGE UP
SPECIMEN SOURCE: SIGNIFICANT CHANGE UP
SPECIMEN SOURCE: SIGNIFICANT CHANGE UP

## 2017-02-14 ENCOUNTER — APPOINTMENT (OUTPATIENT)
Dept: INTERNAL MEDICINE | Facility: CLINIC | Age: 82
End: 2017-02-14

## 2017-02-14 VITALS — HEIGHT: 65 IN | BODY MASS INDEX: 28.16 KG/M2 | WEIGHT: 169 LBS

## 2017-02-14 VITALS — DIASTOLIC BLOOD PRESSURE: 70 MMHG | SYSTOLIC BLOOD PRESSURE: 120 MMHG

## 2017-02-14 DIAGNOSIS — J18.9 PNEUMONIA, UNSPECIFIED ORGANISM: ICD-10-CM

## 2017-02-14 DIAGNOSIS — R50.9 FEVER, UNSPECIFIED: ICD-10-CM

## 2017-02-14 DIAGNOSIS — Z01.818 ENCOUNTER FOR OTHER PREPROCEDURAL EXAMINATION: ICD-10-CM

## 2017-02-14 DIAGNOSIS — J30.1 ALLERGIC RHINITIS DUE TO POLLEN: ICD-10-CM

## 2017-02-14 DIAGNOSIS — Z86.69 PERSONAL HISTORY OF OTHER DISEASES OF THE NERVOUS SYSTEM AND SENSE ORGANS: ICD-10-CM

## 2017-02-15 LAB
ALBUMIN SERPL ELPH-MCNC: 3.7 G/DL
ALP BLD-CCNC: 72 U/L
ALT SERPL-CCNC: 13 U/L
ANION GAP SERPL CALC-SCNC: 18 MMOL/L
AST SERPL-CCNC: 19 U/L
BASOPHILS # BLD AUTO: 0.02 K/UL
BASOPHILS NFR BLD AUTO: 0.4 %
BILIRUB SERPL-MCNC: 0.4 MG/DL
BUN SERPL-MCNC: 16 MG/DL
CALCIUM SERPL-MCNC: 9.6 MG/DL
CHLORIDE SERPL-SCNC: 101 MMOL/L
CHOLEST SERPL-MCNC: 126 MG/DL
CHOLEST/HDLC SERPL: 2.9 RATIO
CO2 SERPL-SCNC: 22 MMOL/L
CREAT SERPL-MCNC: 0.94 MG/DL
EOSINOPHIL # BLD AUTO: 0.24 K/UL
EOSINOPHIL NFR BLD AUTO: 4.8 %
GLUCOSE SERPL-MCNC: 78 MG/DL
HCT VFR BLD CALC: 37.1 %
HDLC SERPL-MCNC: 43 MG/DL
HGB BLD-MCNC: 11.6 G/DL
IMM GRANULOCYTES NFR BLD AUTO: 0 %
LDLC SERPL CALC-MCNC: 65 MG/DL
LYMPHOCYTES # BLD AUTO: 1.73 K/UL
LYMPHOCYTES NFR BLD AUTO: 34.9 %
MAN DIFF?: NORMAL
MCHC RBC-ENTMCNC: 29.9 PG
MCHC RBC-ENTMCNC: 31.3 GM/DL
MCV RBC AUTO: 95.6 FL
MONOCYTES # BLD AUTO: 0.33 K/UL
MONOCYTES NFR BLD AUTO: 6.7 %
NEUTROPHILS # BLD AUTO: 2.64 K/UL
NEUTROPHILS NFR BLD AUTO: 53.2 %
PLATELET # BLD AUTO: 200 K/UL
POTASSIUM SERPL-SCNC: 4.5 MMOL/L
PROT SERPL-MCNC: 7.1 G/DL
RBC # BLD: 3.88 M/UL
RBC # FLD: 15.6 %
SAVE SPECIMEN: NORMAL
SODIUM SERPL-SCNC: 141 MMOL/L
TRIGL SERPL-MCNC: 89 MG/DL
WBC # FLD AUTO: 4.96 K/UL

## 2017-03-06 ENCOUNTER — RX RENEWAL (OUTPATIENT)
Age: 82
End: 2017-03-06

## 2017-04-17 ENCOUNTER — RX RENEWAL (OUTPATIENT)
Age: 82
End: 2017-04-17

## 2017-07-03 ENCOUNTER — RX RENEWAL (OUTPATIENT)
Age: 82
End: 2017-07-03

## 2017-07-25 ENCOUNTER — APPOINTMENT (OUTPATIENT)
Dept: INTERNAL MEDICINE | Facility: CLINIC | Age: 82
End: 2017-07-25

## 2017-07-25 VITALS
WEIGHT: 168 LBS | BODY MASS INDEX: 27.99 KG/M2 | SYSTOLIC BLOOD PRESSURE: 132 MMHG | HEART RATE: 81 BPM | DIASTOLIC BLOOD PRESSURE: 82 MMHG | HEIGHT: 65 IN

## 2017-07-25 DIAGNOSIS — N40.0 BENIGN PROSTATIC HYPERPLASIA WITHOUT LOWER URINARY TRACT SYMPMS: ICD-10-CM

## 2017-07-25 DIAGNOSIS — I10 ESSENTIAL (PRIMARY) HYPERTENSION: ICD-10-CM

## 2017-07-25 DIAGNOSIS — E78.5 HYPERLIPIDEMIA, UNSPECIFIED: ICD-10-CM

## 2017-07-25 RX ORDER — AZITHROMYCIN 250 MG/1
250 TABLET, FILM COATED ORAL
Qty: 1 | Refills: 1 | Status: DISCONTINUED | COMMUNITY
Start: 2017-01-11 | End: 2017-07-25

## 2017-07-25 RX ORDER — FLUTICASONE PROPIONATE 50 UG/1
50 SPRAY, METERED NASAL TWICE DAILY
Qty: 1 | Refills: 1 | Status: DISCONTINUED | COMMUNITY
Start: 2017-01-11 | End: 2017-07-25

## 2017-07-27 LAB
ALBUMIN SERPL ELPH-MCNC: 4.1 G/DL
ALP BLD-CCNC: 84 U/L
ALT SERPL-CCNC: 14 U/L
ANION GAP SERPL CALC-SCNC: 19 MMOL/L
AST SERPL-CCNC: 29 U/L
BASOPHILS # BLD AUTO: 0.02 K/UL
BASOPHILS NFR BLD AUTO: 0.4 %
BILIRUB SERPL-MCNC: 0.6 MG/DL
BUN SERPL-MCNC: 18 MG/DL
CALCIUM SERPL-MCNC: 9.8 MG/DL
CHLORIDE SERPL-SCNC: 101 MMOL/L
CHOLEST SERPL-MCNC: 120 MG/DL
CHOLEST/HDLC SERPL: 2.9 RATIO
CO2 SERPL-SCNC: 21 MMOL/L
CREAT SERPL-MCNC: 0.96 MG/DL
EOSINOPHIL # BLD AUTO: 0.22 K/UL
EOSINOPHIL NFR BLD AUTO: 4.2 %
GLUCOSE SERPL-MCNC: 95 MG/DL
HCT VFR BLD CALC: 38.1 %
HDLC SERPL-MCNC: 41 MG/DL
HGB BLD-MCNC: 12.3 G/DL
IMM GRANULOCYTES NFR BLD AUTO: 0 %
LDLC SERPL CALC-MCNC: 66 MG/DL
LYMPHOCYTES # BLD AUTO: 1.76 K/UL
LYMPHOCYTES NFR BLD AUTO: 33.9 %
MAN DIFF?: NORMAL
MCHC RBC-ENTMCNC: 30.3 PG
MCHC RBC-ENTMCNC: 32.3 GM/DL
MCV RBC AUTO: 93.8 FL
MONOCYTES # BLD AUTO: 0.42 K/UL
MONOCYTES NFR BLD AUTO: 8.1 %
NEUTROPHILS # BLD AUTO: 2.77 K/UL
NEUTROPHILS NFR BLD AUTO: 53.4 %
PLATELET # BLD AUTO: 240 K/UL
POTASSIUM SERPL-SCNC: 4.4 MMOL/L
PROT SERPL-MCNC: 8 G/DL
RBC # BLD: 4.06 M/UL
RBC # FLD: 15.5 %
SAVE SPECIMEN: NORMAL
SODIUM SERPL-SCNC: 141 MMOL/L
TRIGL SERPL-MCNC: 64 MG/DL
WBC # FLD AUTO: 5.19 K/UL

## 2017-08-23 ENCOUNTER — RX RENEWAL (OUTPATIENT)
Age: 82
End: 2017-08-23

## 2017-11-21 ENCOUNTER — APPOINTMENT (OUTPATIENT)
Dept: INTERNAL MEDICINE | Facility: CLINIC | Age: 82
End: 2017-11-21
Payer: MEDICARE

## 2017-11-21 VITALS
WEIGHT: 158 LBS | SYSTOLIC BLOOD PRESSURE: 126 MMHG | DIASTOLIC BLOOD PRESSURE: 80 MMHG | HEART RATE: 94 BPM | HEIGHT: 65 IN | BODY MASS INDEX: 26.33 KG/M2

## 2017-11-21 DIAGNOSIS — R63.4 ABNORMAL WEIGHT LOSS: ICD-10-CM

## 2017-11-21 DIAGNOSIS — R63.0 ANOREXIA: ICD-10-CM

## 2017-11-21 PROCEDURE — 90662 IIV NO PRSV INCREASED AG IM: CPT

## 2017-11-21 PROCEDURE — 36415 COLL VENOUS BLD VENIPUNCTURE: CPT | Mod: PD

## 2017-11-21 PROCEDURE — G0008: CPT

## 2017-11-21 PROCEDURE — 99214 OFFICE O/P EST MOD 30 MIN: CPT | Mod: 25,PD

## 2017-11-22 ENCOUNTER — INPATIENT (INPATIENT)
Facility: HOSPITAL | Age: 82
LOS: 2 days | Discharge: HOME | DRG: 196 | End: 2017-11-25
Attending: HOSPITALIST | Admitting: INTERNAL MEDICINE
Payer: MEDICARE

## 2017-11-22 VITALS
OXYGEN SATURATION: 100 % | RESPIRATION RATE: 18 BRPM | SYSTOLIC BLOOD PRESSURE: 130 MMHG | HEART RATE: 70 BPM | DIASTOLIC BLOOD PRESSURE: 76 MMHG

## 2017-11-22 DIAGNOSIS — Z98.41 CATARACT EXTRACTION STATUS, RIGHT EYE: Chronic | ICD-10-CM

## 2017-11-22 DIAGNOSIS — R06.02 SHORTNESS OF BREATH: ICD-10-CM

## 2017-11-22 LAB
BASOPHILS # BLD AUTO: 0 K/UL — SIGNIFICANT CHANGE UP (ref 0–0.2)
BASOPHILS NFR BLD AUTO: 0.7 % — SIGNIFICANT CHANGE UP (ref 0–2)
CK MB BLD-MCNC: 2.5 % — SIGNIFICANT CHANGE UP (ref 0–3.5)
CK MB CFR SERPL CALC: 3 NG/ML — SIGNIFICANT CHANGE UP (ref 0–6.7)
CK SERPL-CCNC: 121 U/L — SIGNIFICANT CHANGE UP (ref 30–200)
EOSINOPHIL # BLD AUTO: 0.2 K/UL — SIGNIFICANT CHANGE UP (ref 0–0.5)
EOSINOPHIL NFR BLD AUTO: 4.2 % — SIGNIFICANT CHANGE UP (ref 0–6)
GAS PNL BLDV: SIGNIFICANT CHANGE UP
HCT VFR BLD CALC: 37.1 % — LOW (ref 39–50)
HGB BLD-MCNC: 12.1 G/DL — LOW (ref 13–17)
LYMPHOCYTES # BLD AUTO: 1.4 K/UL — SIGNIFICANT CHANGE UP (ref 1–3.3)
LYMPHOCYTES # BLD AUTO: 29.4 % — SIGNIFICANT CHANGE UP (ref 13–44)
MCHC RBC-ENTMCNC: 31.7 PG — SIGNIFICANT CHANGE UP (ref 27–34)
MCHC RBC-ENTMCNC: 32.5 GM/DL — SIGNIFICANT CHANGE UP (ref 32–36)
MCV RBC AUTO: 97.5 FL — SIGNIFICANT CHANGE UP (ref 80–100)
MONOCYTES # BLD AUTO: 0.4 K/UL — SIGNIFICANT CHANGE UP (ref 0–0.9)
MONOCYTES NFR BLD AUTO: 8.3 % — SIGNIFICANT CHANGE UP (ref 2–14)
NEUTROPHILS # BLD AUTO: 2.7 K/UL — SIGNIFICANT CHANGE UP (ref 1.8–7.4)
NEUTROPHILS NFR BLD AUTO: 57.4 % — SIGNIFICANT CHANGE UP (ref 43–77)
NT-PROBNP SERPL-SCNC: 2976 PG/ML — HIGH (ref 0–300)
PLATELET # BLD AUTO: 240 K/UL — SIGNIFICANT CHANGE UP (ref 150–400)
RBC # BLD: 3.81 M/UL — LOW (ref 4.2–5.8)
RBC # FLD: 13.6 % — SIGNIFICANT CHANGE UP (ref 10.3–14.5)
TROPONIN T SERPL-MCNC: <0.01 NG/ML — SIGNIFICANT CHANGE UP (ref 0–0.06)
WBC # BLD: 4.7 K/UL — SIGNIFICANT CHANGE UP (ref 3.8–10.5)
WBC # FLD AUTO: 4.7 K/UL — SIGNIFICANT CHANGE UP (ref 3.8–10.5)

## 2017-11-22 PROCEDURE — 99285 EMERGENCY DEPT VISIT HI MDM: CPT | Mod: 25,GC

## 2017-11-22 PROCEDURE — 93010 ELECTROCARDIOGRAM REPORT: CPT

## 2017-11-22 PROCEDURE — 71010: CPT | Mod: 26

## 2017-11-22 RX ORDER — SODIUM CHLORIDE 9 MG/ML
1000 INJECTION INTRAMUSCULAR; INTRAVENOUS; SUBCUTANEOUS ONCE
Qty: 0 | Refills: 0 | Status: DISCONTINUED | OUTPATIENT
Start: 2017-11-22 | End: 2017-11-22

## 2017-11-22 RX ORDER — FUROSEMIDE 40 MG
20 TABLET ORAL ONCE
Qty: 0 | Refills: 0 | Status: DISCONTINUED | OUTPATIENT
Start: 2017-11-22 | End: 2017-11-23

## 2017-11-22 RX ORDER — FUROSEMIDE 40 MG
20 TABLET ORAL ONCE
Qty: 0 | Refills: 0 | Status: COMPLETED | OUTPATIENT
Start: 2017-11-22 | End: 2017-11-22

## 2017-11-22 RX ORDER — FUROSEMIDE 40 MG
20 TABLET ORAL ONCE
Qty: 0 | Refills: 0 | Status: DISCONTINUED | OUTPATIENT
Start: 2017-11-22 | End: 2017-11-22

## 2017-11-22 RX ORDER — VALSARTAN 80 MG/1
1 TABLET ORAL
Qty: 0 | Refills: 0 | COMMUNITY

## 2017-11-22 RX ADMIN — Medication 20 MILLIGRAM(S): at 22:23

## 2017-11-22 NOTE — ED PROVIDER NOTE - PROGRESS NOTE DETAILS
Patient less SOB on nc 2L sat 99%, CXR showing b/l patchy opacities concern for PNA. will obtain blood cultures and then start Levaquin 750mG x1 as pt has documented penicillin allergy. UA and culture attempt made x2 by RN but unsuccessful.  EKG no significant changes. pro-BNP 2.9K less than previous admission level. Will admit to Medicine under Dr. Paget.

## 2017-11-22 NOTE — ED PROVIDER NOTE - NSCAREINITIATED _GEN_ER

## 2017-11-22 NOTE — ED PROVIDER NOTE - DIAGNOSIS COUNSELING, MDM
conducted a detailed discussion... I had a detailed discussion with the patient and/or guardian regarding the historical points, exam findings, and any diagnostic results supporting the discharge/admit diagnosis. I reviewed all lab and imaging results from this ED visit, and discussed ALL results with the patient, including all abnormal results and incidental findings. I recommened appropriate follow up for all incidental findings. The patient expressed understanding of all results discussed and follow up instructions given.

## 2017-11-22 NOTE — ED PROVIDER NOTE - CARE PLAN
Principal Discharge DX:	Dizziness Principal Discharge DX:	SOB (shortness of breath) Principal Discharge DX:	SOB (shortness of breath)  Secondary Diagnosis:	Abdominal mass, RUQ (right upper quadrant)  Secondary Diagnosis:	Pneumonia due to infectious organism, unspecified laterality, unspecified part of lung

## 2017-11-22 NOTE — ED PROVIDER NOTE - OBJECTIVE STATEMENT
89M hx of HTN, HLD, BPH, Bladder CA s/p ?cystoscopic removal, CVA, Hard of Hearing, presents to the ER w/ pre-syncope and sob. Patient developed symptoms over the past month. states he was making breakfast today and felt dizzy. No LOC, chest pain or palpations. He does endorse SOB on exertion. LE swelling also over the past month. Cough also developed in the last month non-productive. Son at bedside states that his father has had 20lbs weight loss over the last 6 months w/ decrease in appetite.  +chills, no fevers or night sweats. 89M hx of HTN, HLD, BPH, Bladder CA s/p ?cystoscopic removal, CVA, Hard of Hearing, presents to the ER w/ pre-syncope and sob. Patient developed symptoms over the past month. states he was making breakfast today and felt dizzy. No LOC, chest pain or palpations. He does endorse SOB on exertion. LE swelling also over the past month. Cough also developed in the last month non-productive. Son at bedside states that his father has had 20lbs weight loss over the last 6 months w/ decrease in appetite.  +chills, no fevers or night sweats.    PCP: Dr. Eric Soni   [phone] 89M hx of HTN, HLD, BPH, Bladder CA s/p ?cystoscopic removal, CVA, Hard of Hearing, presents to the ER w/ pre-syncope and sob. Patient developed symptoms over the past month. states he was making breakfast today and felt dizzy and near syncopal. No LOC, chest pain or palpations. He does endorse SOB on exertion. LE swelling also over the past month. Cough also developed in the last month non-productive. Son at bedside states that his father has had 20lbs weight loss over the last 6 months w/ decrease in appetite.  +chills, no fevers or night sweats. Patient has had similar symptoms in the past with pneumonia    PCP: Dr. Eric Soni   [phone]

## 2017-11-22 NOTE — ED PROVIDER NOTE - SECONDARY DIAGNOSIS.
Abdominal mass, RUQ (right upper quadrant) Pneumonia due to infectious organism, unspecified laterality, unspecified part of lung

## 2017-11-22 NOTE — ED ADULT NURSE NOTE - NS ED NURSE REPORT GIVEN TO FT
TEETEE Díaz TEETEE Díaz. Aware urology has been consulted for pt. r/t pt.'s difficulty urinating and aware pt. is hard of hearing.

## 2017-11-22 NOTE — ED ADULT NURSE NOTE - PMH
ASHD (arteriosclerotic heart disease)    BPH (benign prostatic hyperplasia)    CVA (cerebral infarction)    Hepatitis  per son, contracted hepatitis from tattoo he received in his teens - unsure which type  [place] (hard of hearing)  right hearing aid  HTN (hypertension)    Hyperlipidemia    Neoplasm of bladder

## 2017-11-22 NOTE — ED PROVIDER NOTE - ENMT, MLM
Airway patent, Nasal mucosa clear. Mouth with normal mucosa. Throat has no vesicles, no oropharyngeal exudates and uvula is midline. Airway patent, moist MM

## 2017-11-22 NOTE — ED PROVIDER NOTE - ATTENDING CONTRIBUTION TO CARE
89 yo M presents to the ER s/p syncopal episode. He was ambulating and felt lightheaded and near syncopal but sat down before synopsizing. He has been have few days of exertional dyspnea. denies any chest pain. No sob at this time. He does have a nonproductive cough. No fever.   PE with bilateral lower lung crackles. 89 yo M presents to the ER s/p near syncopal episode. He was ambulating and felt lightheaded and near syncopal but sat down before synopsizing. He has been have few days of exertional dyspnea. denies any chest pain. No sob at this time. He does have a nonproductive cough. No fever.   PE with bilateral lower lung crackles.  ED workup demonstrates elevated BNP 2976. Trop negative. labs otherwise normal. EKG with no STEMI.  CXR demonstrates pulmonary edema and bilateral pleural effusions, underlying parenchymal disease cannot be excluded.   Patient was treated with lasix for CHF and covered empirically with levaquin for possible pneumonia. VS remained stable.     Based on patient's HPI as well as the results of today's workup, I felt that patient warranted admission to the hospital for further workup/evaluation and continued management. I discussed the findings of today's workup with the patient and addressed the patient's questions and concerns. The patient was agreeable with admission. I spoke with the hospitalist who accepted the patient for admission and subsequently took over the patient's care.

## 2017-11-22 NOTE — ED PROVIDER NOTE - MEDICAL DECISION MAKING DETAILS
David: 89M hx of HTN, HLD, BPH, Bladder CA s/p ?cystoscopic removal, CVA, Hard of Hearing, presents to the ER w/ pre-syncope and sob. high suspicion of CHF exacerbation given LE edema and b/l crackles at lung bases. Will obtain CXR, ekg, vbg, cbc, cmp, cardiac enzymes, bnp, tsh, straight cath to obtain UA and culture. Give supplemental oxygen

## 2017-11-22 NOTE — ED ADULT NURSE REASSESSMENT NOTE - NS ED NURSE REASSESS COMMENT FT1
2000 - pt. straight cathed as per MD's orders using sterile technique. 1 attempt and met resistance. Catheter immediately removed. Blood seen at tip of catheter.  MD Alba aware and reports okay to attempt again w/ coude catheter. Attempted again using sterile technique w/ coude. Resistance met again and pt. reports pain. Catheter again immediately removed. Pt. not actively bleeding. MD Alba and Lexa aware. 2000 - pt. straight cathed as per MD's orders using sterile technique. 1 attempt and met resistance. Catheter immediately removed. Blood seen at tip of catheter.  MD Alba aware and reports okay to attempt again w/ coude catheter. Attempted again using sterile technique w/ coude. Resistance met again and pt. reports pain. Catheter again immediately removed. Pt. not actively bleeding. Pt. not on blood thinners. MD Alba and Lexa aware. 2000 - pt. straight cathed as per MD's orders using sterile technique. 1 attempt and met resistance. Catheter immediately removed. Blood seen at tip of catheter.  MD Alba aware and reports okay to attempt again w/ coude catheter. Attempted again using sterile technique w/ coude. Resistance met again and pt. reports pain. Catheter again immediately removed. Pt. not actively bleeding. Pt. not on blood thinners. MD Alba and Lexa aware and do not want a urology consult at this time.

## 2017-11-22 NOTE — ED ADULT NURSE REASSESSMENT NOTE - NS ED NURSE REASSESS COMMENT FT1
receiving TEETEE Díaz on 5 Peter aware that pt. is having difficulty urinating and that urology has been contacted.

## 2017-11-22 NOTE — ED ADULT NURSE NOTE - OBJECTIVE STATEMENT
89 y/o M, A&Ox3, hard of hearing, enters ED w/ c/o dizziness. 89 y/o M, A&Ox3, hard of hearing, enters ED by EMS w/ c/o dizziness. Pt. has hx. of bladder cancer, which family reports was dx. about 2 years ago. Not currently on chemo. Pt. also denies any syncope but also reports SOB on exertion. Pt. presents w/ O2 sat 90% on RA - placed on 3L NC and O2 improved to 100%. Pt. reports that he developed the dizziness within the last month but it has worsened the last few days. Pt. states he was making breakfast today and felt dizzy. Pt. has bilateral lower extremity edema, which pt. reports is new onset within the last month. No fever, but pt. reports chill. Crackles heard in the lower bases bilaterally. As per son, pt. has had decreased appetite and has lost 20-30 lbs. over the past 6 months. Skin warm and dry. Family at bedside. Safety maintained. 89 y/o M, A&Ox3, hard of hearing, enters ED by EMS w/ c/o dizziness. Pt. has hx. of bladder cancer, which family reports was dx. about 2 years ago. Not currently on chemo. Pt. also denies any syncope but also reports SOB on exertion. Pt. presents w/ O2 sat 90% on RA - placed on 2L NC and O2 improved to 100%. Pt. reports that he developed the dizziness within the last month but it has worsened the last few days. Pt. states he was making breakfast today and felt dizzy. Pt. has bilateral lower extremity edema, which pt. reports is new onset within the last month. No fever, but pt. reports chill. Crackles heard in the lower bases bilaterally. As per son, pt. has had decreased appetite and has lost 20-30 lbs. over the past 6 months. Skin warm and dry. Family at bedside. Safety maintained.

## 2017-11-23 ENCOUNTER — MESSAGE (OUTPATIENT)
Age: 82
End: 2017-11-23

## 2017-11-23 ENCOUNTER — TRANSCRIPTION ENCOUNTER (OUTPATIENT)
Age: 82
End: 2017-11-23

## 2017-11-23 DIAGNOSIS — J18.9 PNEUMONIA, UNSPECIFIED ORGANISM: ICD-10-CM

## 2017-11-23 DIAGNOSIS — N40.0 BENIGN PROSTATIC HYPERPLASIA WITHOUT LOWER URINARY TRACT SYMPTOMS: ICD-10-CM

## 2017-11-23 DIAGNOSIS — I10 ESSENTIAL (PRIMARY) HYPERTENSION: ICD-10-CM

## 2017-11-23 DIAGNOSIS — E78.5 HYPERLIPIDEMIA, UNSPECIFIED: ICD-10-CM

## 2017-11-23 DIAGNOSIS — R19.01 RIGHT UPPER QUADRANT ABDOMINAL SWELLING, MASS AND LUMP: ICD-10-CM

## 2017-11-23 DIAGNOSIS — Z29.9 ENCOUNTER FOR PROPHYLACTIC MEASURES, UNSPECIFIED: ICD-10-CM

## 2017-11-23 DIAGNOSIS — I50.9 HEART FAILURE, UNSPECIFIED: ICD-10-CM

## 2017-11-23 DIAGNOSIS — R06.02 SHORTNESS OF BREATH: ICD-10-CM

## 2017-11-23 DIAGNOSIS — D64.9 ANEMIA, UNSPECIFIED: ICD-10-CM

## 2017-11-23 LAB
ANION GAP SERPL CALC-SCNC: 14 MMOL/L — SIGNIFICANT CHANGE UP (ref 5–17)
BASOPHILS # BLD AUTO: 0 K/UL — SIGNIFICANT CHANGE UP (ref 0–0.2)
BASOPHILS NFR BLD AUTO: 0.1 % — SIGNIFICANT CHANGE UP (ref 0–2)
BUN SERPL-MCNC: 18 MG/DL — SIGNIFICANT CHANGE UP (ref 7–23)
CALCIUM SERPL-MCNC: 9.4 MG/DL — SIGNIFICANT CHANGE UP (ref 8.4–10.5)
CHLORIDE SERPL-SCNC: 103 MMOL/L — SIGNIFICANT CHANGE UP (ref 96–108)
CO2 SERPL-SCNC: 25 MMOL/L — SIGNIFICANT CHANGE UP (ref 22–31)
CREAT SERPL-MCNC: 0.96 MG/DL — SIGNIFICANT CHANGE UP (ref 0.5–1.3)
EOSINOPHIL # BLD AUTO: 0.1 K/UL — SIGNIFICANT CHANGE UP (ref 0–0.5)
EOSINOPHIL NFR BLD AUTO: 1.6 % — SIGNIFICANT CHANGE UP (ref 0–6)
GLUCOSE SERPL-MCNC: 96 MG/DL — SIGNIFICANT CHANGE UP (ref 70–99)
HCT VFR BLD CALC: 36.3 % — LOW (ref 39–50)
HGB BLD-MCNC: 12 G/DL — LOW (ref 13–17)
LYMPHOCYTES # BLD AUTO: 1.2 K/UL — SIGNIFICANT CHANGE UP (ref 1–3.3)
LYMPHOCYTES # BLD AUTO: 21.5 % — SIGNIFICANT CHANGE UP (ref 13–44)
MAGNESIUM SERPL-MCNC: 1.7 MG/DL — SIGNIFICANT CHANGE UP (ref 1.6–2.6)
MCHC RBC-ENTMCNC: 32.1 PG — SIGNIFICANT CHANGE UP (ref 27–34)
MCHC RBC-ENTMCNC: 33.1 GM/DL — SIGNIFICANT CHANGE UP (ref 32–36)
MCV RBC AUTO: 97.1 FL — SIGNIFICANT CHANGE UP (ref 80–100)
MONOCYTES # BLD AUTO: 0.4 K/UL — SIGNIFICANT CHANGE UP (ref 0–0.9)
MONOCYTES NFR BLD AUTO: 7.5 % — SIGNIFICANT CHANGE UP (ref 2–14)
NEUTROPHILS # BLD AUTO: 3.8 K/UL — SIGNIFICANT CHANGE UP (ref 1.8–7.4)
NEUTROPHILS NFR BLD AUTO: 69.3 % — SIGNIFICANT CHANGE UP (ref 43–77)
PHOSPHATE SERPL-MCNC: 3.3 MG/DL — SIGNIFICANT CHANGE UP (ref 2.5–4.5)
PLATELET # BLD AUTO: 217 K/UL — SIGNIFICANT CHANGE UP (ref 150–400)
POTASSIUM SERPL-MCNC: 3.9 MMOL/L — SIGNIFICANT CHANGE UP (ref 3.5–5.3)
POTASSIUM SERPL-SCNC: 3.9 MMOL/L — SIGNIFICANT CHANGE UP (ref 3.5–5.3)
RBC # BLD: 3.74 M/UL — LOW (ref 4.2–5.8)
RBC # FLD: 13.3 % — SIGNIFICANT CHANGE UP (ref 10.3–14.5)
SODIUM SERPL-SCNC: 142 MMOL/L — SIGNIFICANT CHANGE UP (ref 135–145)
TROPONIN T SERPL-MCNC: <0.01 NG/ML — SIGNIFICANT CHANGE UP (ref 0–0.06)
TSH SERPL-MCNC: 5.5 UIU/ML — HIGH (ref 0.27–4.2)
WBC # BLD: 5.6 K/UL — SIGNIFICANT CHANGE UP (ref 3.8–10.5)
WBC # FLD AUTO: 5.6 K/UL — SIGNIFICANT CHANGE UP (ref 3.8–10.5)

## 2017-11-23 PROCEDURE — 71250 CT THORAX DX C-: CPT | Mod: 26

## 2017-11-23 PROCEDURE — 99223 1ST HOSP IP/OBS HIGH 75: CPT | Mod: AI,GC

## 2017-11-23 PROCEDURE — 12345: CPT | Mod: GC,NC

## 2017-11-23 RX ORDER — FINASTERIDE 5 MG/1
5 TABLET, FILM COATED ORAL DAILY
Qty: 0 | Refills: 0 | Status: DISCONTINUED | OUTPATIENT
Start: 2017-11-23 | End: 2017-11-25

## 2017-11-23 RX ORDER — FUROSEMIDE 40 MG
20 TABLET ORAL DAILY
Qty: 0 | Refills: 0 | Status: DISCONTINUED | OUTPATIENT
Start: 2017-11-23 | End: 2017-11-24

## 2017-11-23 RX ORDER — ENOXAPARIN SODIUM 100 MG/ML
40 INJECTION SUBCUTANEOUS EVERY 24 HOURS
Qty: 0 | Refills: 0 | Status: DISCONTINUED | OUTPATIENT
Start: 2017-11-23 | End: 2017-11-25

## 2017-11-23 RX ORDER — MULTIVIT-MIN/FERROUS GLUCONATE 9 MG/15 ML
1 LIQUID (ML) ORAL DAILY
Qty: 0 | Refills: 0 | Status: DISCONTINUED | OUTPATIENT
Start: 2017-11-23 | End: 2017-11-25

## 2017-11-23 RX ORDER — LOSARTAN POTASSIUM 100 MG/1
50 TABLET, FILM COATED ORAL DAILY
Qty: 0 | Refills: 0 | Status: DISCONTINUED | OUTPATIENT
Start: 2017-11-23 | End: 2017-11-25

## 2017-11-23 RX ORDER — ATORVASTATIN CALCIUM 80 MG/1
10 TABLET, FILM COATED ORAL AT BEDTIME
Qty: 0 | Refills: 0 | Status: DISCONTINUED | OUTPATIENT
Start: 2017-11-23 | End: 2017-11-25

## 2017-11-23 RX ORDER — ASPIRIN/CALCIUM CARB/MAGNESIUM 324 MG
81 TABLET ORAL DAILY
Qty: 0 | Refills: 0 | Status: DISCONTINUED | OUTPATIENT
Start: 2017-11-23 | End: 2017-11-25

## 2017-11-23 RX ADMIN — FINASTERIDE 5 MILLIGRAM(S): 5 TABLET, FILM COATED ORAL at 12:15

## 2017-11-23 RX ADMIN — Medication 20 MILLIGRAM(S): at 18:37

## 2017-11-23 RX ADMIN — Medication 81 MILLIGRAM(S): at 12:15

## 2017-11-23 RX ADMIN — Medication 1 TABLET(S): at 12:15

## 2017-11-23 RX ADMIN — ATORVASTATIN CALCIUM 10 MILLIGRAM(S): 80 TABLET, FILM COATED ORAL at 21:31

## 2017-11-23 RX ADMIN — ENOXAPARIN SODIUM 40 MILLIGRAM(S): 100 INJECTION SUBCUTANEOUS at 05:59

## 2017-11-23 RX ADMIN — LOSARTAN POTASSIUM 50 MILLIGRAM(S): 100 TABLET, FILM COATED ORAL at 05:59

## 2017-11-23 NOTE — H&P ADULT - ATTENDING COMMENTS
pt seen and evaluated by me, agree with h/p, a/p above and d/w resident at length.  Elderly man a/w sob/cough/chills.  History and exam do not support pulmonary edema as the etiology of symptoms though will avoid further IVFs given diastolic CHF.  On abx for possible PNA.  Given smoking history and h/o malignancy will r/o mass with CT chest.  Pt this am states he feels signficantly improved since abx, most likely CAP.  Cont home meds.  DVT prophylaxis. pt seen and evaluated by me, agree with h/p, a/p above and d/w resident at length.  Elderly man a/w sob/cough/chills.  Though pt has diastolic CHF symptoms less consistent with chf exacerbation - on my exam lung rhonchi/crackles and no LE edema (though pt did receive both IVFs and lasix in ED).  On abx for possible PNA.  Given smoking history, weight loss and h/o malignancy will r/o mass with CT chest.  Pt this am states he feels signficantly improved since abx, most likely CAP.  Cont home meds.  DVT prophylaxis.

## 2017-11-23 NOTE — PROGRESS NOTE ADULT - SUBJECTIVE AND OBJECTIVE BOX
Patient is a 89y old  Male who presents with a chief complaint of 89M c/o "cant get good air" (23 Nov 2017 01:31)    SUBJECTIVE / OVERNIGHT EVENTS: No acute events overnight. Pt. denies fevers, chills, chest pain. Endorses intermittent feeling of SOB.     MEDICATIONS  (STANDING):  aspirin enteric coated 81 milliGRAM(s) Oral daily  atorvastatin 10 milliGRAM(s) Oral at bedtime  enoxaparin Injectable 40 milliGRAM(s) SubCutaneous every 24 hours  finasteride 5 milliGRAM(s) Oral daily  levoFLOXacin IVPB 750 milliGRAM(s) IV Intermittent every 24 hours  losartan 50 milliGRAM(s) Oral daily  multivitamin/minerals 1 Tablet(s) Oral daily    MEDICATIONS  (PRN):    Vital Signs Last 24 Hrs  T(C): 36.4 (23 Nov 2017 04:52), Max: 36.5 (22 Nov 2017 22:42)  T(F): 97.5 (23 Nov 2017 04:52), Max: 97.7 (22 Nov 2017 22:42)  HR: 96 (23 Nov 2017 04:52) (70 - 96)  BP: 136/89 (23 Nov 2017 04:52) (124/83 - 136/89)  BP(mean): --  RR: 18 (23 Nov 2017 04:52) (18 - 21)  SpO2: 93% (23 Nov 2017 04:52) (90% - 100%)  CAPILLARY BLOOD GLUCOSE        I&O's Summary    22 Nov 2017 07:01  -  23 Nov 2017 07:00  --------------------------------------------------------  IN: 0 mL / OUT: 750 mL / NET: -750 mL    PHYSICAL EXAM:  GENERAL: NAD, well-developed  HEAD:  Atraumatic, Normocephalic  EYES: EOMI, PERRLA, conjunctiva and sclera clear  NECK: Supple, No JVD  CHEST/LUNG: bibasilar crackles  HEART: Regular rate and rhythm; No murmurs, rubs, or gallops  ABDOMEN: Soft, Nontender, Nondistended; Bowel sounds present  EXTREMITIES:  2+ Peripheral Pulses, No clubbing, cyanosis, or edema  PSYCH: AAOx3  NEUROLOGY: non-focal  SKIN: No rashes or lesions    LABS:                        12.0   5.6   )-----------( 217      ( 23 Nov 2017 07:11 )             36.3     11-23    142  |  103  |  18  ----------------------------<  96  3.9   |  25  |  0.96    Ca    9.4      23 Nov 2017 07:11  Phos  3.3     11-23  Mg     1.7     11-23    TPro  7.3  /  Alb  3.8  /  TBili  0.7  /  DBili  x   /  AST  19  /  ALT  17  /  AlkPhos  87  11-22      CARDIAC MARKERS ( 22 Nov 2017 19:57 )  x     / <0.01 ng/mL / 121 U/L / x     / 3.0 ng/mL          RADIOLOGY & ADDITIONAL TESTS:    Imaging Personally Reviewed:    Consultant(s) Notes Reviewed:      Care Discussed with Consultants/Other Providers: Patient is a 89y old  Male who presents with a chief complaint of 89M c/o "cant get good air" (23 Nov 2017 01:31)    SUBJECTIVE / OVERNIGHT EVENTS: No acute events overnight. Pt. denies fevers, chills, chest pain. sob has grreatly improved with lasix but not back to baseline. denies cough    MEDICATIONS  (STANDING):  aspirin enteric coated 81 milliGRAM(s) Oral daily  atorvastatin 10 milliGRAM(s) Oral at bedtime  enoxaparin Injectable 40 milliGRAM(s) SubCutaneous every 24 hours  finasteride 5 milliGRAM(s) Oral daily  levoFLOXacin IVPB 750 milliGRAM(s) IV Intermittent every 24 hours  losartan 50 milliGRAM(s) Oral daily  multivitamin/minerals 1 Tablet(s) Oral daily    MEDICATIONS  (PRN):    Vital Signs Last 24 Hrs  T(C): 36.4 (23 Nov 2017 04:52), Max: 36.5 (22 Nov 2017 22:42)  T(F): 97.5 (23 Nov 2017 04:52), Max: 97.7 (22 Nov 2017 22:42)  HR: 96 (23 Nov 2017 04:52) (70 - 96)  BP: 136/89 (23 Nov 2017 04:52) (124/83 - 136/89)  BP(mean): --  RR: 18 (23 Nov 2017 04:52) (18 - 21)  SpO2: 93% (23 Nov 2017 04:52) (90% - 100%)  CAPILLARY BLOOD GLUCOSE        I&O's Summary    22 Nov 2017 07:01  -  23 Nov 2017 07:00  --------------------------------------------------------  IN: 0 mL / OUT: 750 mL / NET: -750 mL    PHYSICAL EXAM:  GENERAL: NAD, well-developed  HEAD:  Atraumatic, Normocephalic  EYES: EOMI, PERRLA, conjunctiva and sclera clear  NECK: Supple, No JVD  CHEST/LUNG: diffuse bibasilar crackles  HEART: Regular rate and rhythm; No murmurs, rubs, or gallops  ABDOMEN: Soft, Nontender, Nondistended; Bowel sounds present  EXTREMITIES:  2+ Peripheral Pulses, No clubbing, cyanosis, or edema  PSYCH: AAOx3  NEUROLOGY: non-focal  SKIN: No rashes or lesions    LABS:                        12.0   5.6   )-----------( 217      ( 23 Nov 2017 07:11 )             36.3     11-23    142  |  103  |  18  ----------------------------<  96  3.9   |  25  |  0.96    Ca    9.4      23 Nov 2017 07:11  Phos  3.3     11-23  Mg     1.7     11-23    TPro  7.3  /  Alb  3.8  /  TBili  0.7  /  DBili  x   /  AST  19  /  ALT  17  /  AlkPhos  87  11-22      CARDIAC MARKERS ( 22 Nov 2017 19:57 )  x     / <0.01 ng/mL / 121 U/L / x     / 3.0 ng/mL          RADIOLOGY & ADDITIONAL TESTS:    Imaging Personally Reviewed: CT chesT:   IMPRESSION:     Pulmonary fibrosis with superimposed pulmonary edema.  Small bilateral pleural effusions.    Consultant(s) Notes Reviewed:      Care Discussed with Consultants/Other Providers:

## 2017-11-23 NOTE — H&P ADULT - PROBLEM SELECTOR PLAN 2
CAP pneumina suspecting gram positive and atypical bacteria  - c/w levaquin daily as above  -blood cx, legionella urine to be ordered, rvp

## 2017-11-23 NOTE — H&P ADULT - NSHPLABSRESULTS_GEN_ALL_CORE
Personally reviewed available labs, imaging and ekg     Labs  CBC Full  -  ( 2017 19:57 )  WBC Count : 4.7 K/uL  Hemoglobin : 12.1 g/dL  Hematocrit : 37.1 %  Platelet Count - Automated : 240 K/uL  Mean Cell Volume : 97.5 fl  Mean Cell Hemoglobin : 31.7 pg  Mean Cell Hemoglobin Concentration : 32.5 gm/dL  Auto Neutrophil # : 2.7 K/uL  Auto Lymphocyte # : 1.4 K/uL  Auto Monocyte # : 0.4 K/uL  Auto Eosinophil # : 0.2 K/uL  Auto Basophil # : 0.0 K/uL  Auto Neutrophil % : 57.4 %  Auto Lymphocyte % : 29.4 %  Auto Monocyte % : 8.3 %  Auto Eosinophil % : 4.2 %  Auto Basophil % : 0.7 %        141  |  105  |  20  ----------------------------<  106<H>  4.5   |  24  |  0.91    Ca    9.5      2017 19:57    TPro  7.3  /  Alb  3.8  /  TBili  0.7  /  DBili  x   /  AST  19  /  ALT  17  /  AlkPhos  87  11-22        CAPILLARY BLOOD GLUCOSE        CARDIAC MARKERS ( 2017 19:57 )  x     / <0.01 ng/mL / 121 U/L / x     / 3.0 ng/mL      19:57 - VBG - pH: 7.37  | pCO2: 47    | pO2: 23    | Lactate: 1.0          Imaging  CXR: b/l obscuration of diaphragmatic border w/ hazy opacification    EKst degree AV block at 85bpm w/ RBBB & LAFSB w/ av block and RBBB noted on previous ekg

## 2017-11-23 NOTE — PROGRESS NOTE ADULT - PROBLEM SELECTOR PLAN 3
- chronic diastolic HF with no overt signs on exam, no orthopnea, or pnd.  - bnp elevated however lower than previous admissions  - f/u TTE for evaluation, CE neg in ED w/ no CP or exertional dyspnea -possible CAP, PSI score of 104  -pt. afebrile without white count, dry cough on admission, not appreciated now, s/p levaquin - consider discontinuing etiology does not seem infectious -pt. with 20lbs weight loss in past 6 months, follows Dr. Soni as an outpt. who is concerned for abdominal mass on exam, pt. was never got outpt. CTAP  -CT chest with pulmonary fibrosis, upper abdomen view showing fluid filled gallbladder and liver cyst - will get MR abdomen to r/o abd mass  -remote hx of bladder cancer, s/p TURP

## 2017-11-23 NOTE — PROGRESS NOTE ADULT - ASSESSMENT
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 for 1d of "cant get good air" and is found to sob likely subacute w/ dry cough and accompanying weight loss possibly 2/2 pulmonary fibrosis, acute on chronic diastolic HF with pulmonary edema and ?underlying malignancy

## 2017-11-23 NOTE — DISCHARGE NOTE ADULT - PATIENT PORTAL LINK FT
“You can access the Follow[facility] Patient Portal, offered by [facility], by registering with the following website: [email or web address]”

## 2017-11-23 NOTE — H&P ADULT - NSHPREVIEWOFSYSTEMS_GEN_ALL_CORE
CONSTITUTIONAL: No fever, chills+  EYES: No eye pain, no visual disturbance  Ear: no trauma, significant b/l hearing loss  Mouth: no pain in mouth, no cavities  RESPIRATORY: dry cough, difficulty getting good air  CARDIOVASCULAR: No CP, no palpitations  GASTROINTESTINAL: no abdominal pain, no n/v/d, no hematochezia  GENITOURINARY: No dysuria, no hematuria  NEUROLOGICAL: No headaches, no weakness  SKIN: No itching, no rashes  MUSCULOSKELETAL: No joint pain, no joint swelling

## 2017-11-23 NOTE — H&P ADULT - NSHPPHYSICALEXAM_GEN_ALL_CORE
Vital Signs Last 24 Hrs  T(C): 36.4 (23 Nov 2017 00:05), Max: 36.5 (22 Nov 2017 22:42)  T(F): 97.5 (23 Nov 2017 00:05), Max: 97.7 (22 Nov 2017 22:42)  HR: 90 (23 Nov 2017 00:05) (70 - 90)  BP: 133/80 (23 Nov 2017 00:05) (124/83 - 133/80)  BP(mean): --  RR: 18 (23 Nov 2017 00:05) (18 - 21)  SpO2: 94% (23 Nov 2017 00:05) (90% - 100%)    GENERAL: NAD, chronically ill appearing  HEAD:  Atraumatic, Normocephalic  EYES: EOMI, PERRLA, conjunctiva and sclera clear  Mouth: MMM, no lesions  NECK: Supple, no appreciable masses  Lung: normal work of breathing, b/l rhonchi in bases b/l  Chest: S1&S2+, rrr, no m/r/g appreciated  ABDOMEN: bs+, soft, nt, nd, no appreciable masses  : No moreno catheter, no CVA tenderness  EXTREMITIES:  Peripheral Pulses Present in all extremities No LE edema b/l (note received lasix dose in ED prior to exam)  Neuro: A&Ox2 deflected on saying the year-otherwise appropriate to conversation (difficult to assess due to hearing difficulty), no focal deficits  SKIN: warm and dry, no visible rashes

## 2017-11-23 NOTE — H&P ADULT - ASSESSMENT
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

## 2017-11-23 NOTE — H&P ADULT - PROBLEM SELECTOR PLAN 3
chronic diastolic HF  - no overt signs on exam, no orthopnea, or pnd.  - bnp elevated however lower than previous admissions  -c/w asa and statin  - will order TTE for evaluation, CE neg in ED w/ no CP or exertional dyspnea

## 2017-11-23 NOTE — DISCHARGE NOTE ADULT - CARE PROVIDER_API CALL
Maryana Soni (DO), Internal Medicine  Internal Medicine  [address] [place] [address]  Phone: [phone]  Fax: [phone] Johana Bobo), Critical Care Medicine; Pulmonary Disease  [address]  [address]  [place] [address]  Phone: [phone]  Fax: [phone]    Nae Reyes  [facility], [place] [address]  Phone: [phone]  Fax: (   )    - Johana Bobo), Critical Care Medicine; Pulmonary Disease  [address]  [place] [address]  Phone: [phone]  Fax: [phone]    Nae Reyes  [address], [place] [address]  Phone: [phone]  Fax: (   )    -    Rusty Elder), Surgery; Surgical Critical Care  [address] [place] [address]  Phone: [phone]  Fax: [phone]

## 2017-11-23 NOTE — PROGRESS NOTE ADULT - PROBLEM SELECTOR PLAN 2
-CAP suspecting gram positive and atypical bacteria  -c/w levaquin daily as above  -f/u bcx, legionella, and RVP -CAP suspecting gram positive and atypical bacteria  -c/w levaquin daily as above  -f/u bcx, legionella -CAP suspecting gram positive and atypical bacteria, PSI score of 104 qualifying for inpatient management   -c/w levaquin daily as above - day 2/5 -pt. with 20lbs weight loss in past 6 months, follows Dr. Soni as an outpt. who is concerned for abdominal mass on exam, pt. was never got outpt. CTAP  -CT chest showing ?mass near liver - follow up read and consider CTAP inpt. -pt. with 20lbs weight loss in past 6 months, follows Dr. Soni as an outpt. who is concerned for abdominal mass on exam, pt. was never got outpt. CTAP  -CT chest showing ?mass near liver - follow up read and consider CTAP inpt.  -remote hx of bladder cancer, s/p TURP -pt. with 20lbs weight loss in past 6 months, follows Dr. Soni as an outpt. who is concerned for abdominal mass on exam, pt. was never got outpt. CTAP  -CT chest with pulmonary fibrosis, upper abdomen view showing gallbladder with cystic fluid and liver cyst - will get MR abdomen   -remote hx of bladder cancer, s/p TURP -pt. with 20lbs weight loss in past 6 months, follows Dr. Soni as an outpt. who is concerned for abdominal mass on exam, pt. was never got outpt. CTAP  -CT chest with pulmonary fibrosis, upper abdomen view showing fluid filled gallbladder and liver cyst - will get MR abdomen   -remote hx of bladder cancer, s/p TURP - acute on chronic diastolic HF exacerbation with crackles, elevated BNP, and CT showing pulm edema  -c/w lasix 20mg IV daily  -c/w losartan 50mg daily  -monitor I and O

## 2017-11-23 NOTE — DISCHARGE NOTE ADULT - CARE PROVIDERS DIRECT ADDRESSES
,DirectAddress_Unknown ,[email or web address],DirectAddress_Unknown ,[email or web address],DirectAddress_Unknown,[email or web address]

## 2017-11-23 NOTE — DISCHARGE NOTE ADULT - PLAN OF CARE
follow up You presented to the hospital because you had difficulty taking in air. You were treated with ____. Please follow up with your primary care doctor after discharge. A CT clifford was done that showed ______. Please follow up with _____. You presented to the hospital because you had difficulty taking in air. Your breathing improved with lasix which is a water pill. Please continue to take this medication when  you get home and follow up with your primary care doctor. You also were found to have lung disease on a CT scan, probably from many years of smoking. Please follow up with your primary care doctor and make an appointment with a lung doctor. Your primary care doctor was concerned about an abdominal mass. An MRI was done. Please follow up with your primary care doctor about the results and further recommendations. A new medication called lasix was started for your heart failure. Please continue to take this medication and follow up with your primary care doctor. You presented to the hospital because you had difficulty taking in air. Your breathing improved and you did not need antibiotics. You also were found to have lung disease on a CT scan, probably from many years of smoking. Please follow up with your primary care doctor and make an appointment with a lung doctor. Your primary care doctor was concerned about an abdominal mass and weight loss. An MRI was done. Please follow up with your primary care doctor about the results and further recommendations. You presented to the hospital because you had difficulty taking in air. Your breathing improved and you did not need antibiotics. You also were found to have pulmonary fibrosis and edema on a CT scan, probably from many years of smoking and related to heart failure. Your symptoms improved with Lasix however you must also follow up with your primary care doctor and make an appointment with a lung doctor for follow up. Your primary care doctor was concerned about an abdominal mass and weight loss. An MRI was done that showed an enlarged gallbladder. Please follow up with your primary care doctor about the results and further recommendations as well as with general surgery. Your primary care doctor was concerned about an abdominal mass and weight loss. An MRI was done that showed an enlarged gallbladder. Please follow up with Dr. Elder (surgeon) [phone] for outpatient surgery and removal of gallbladder.

## 2017-11-23 NOTE — H&P ADULT - HISTORY OF PRESENT ILLNESS
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 There has been a dry cough & chills w/o fever, sputum production, or chest pain and notes having flu vaccination this year. He noted to the ED LE swelling but does not endorse at time of admission. He is able to lay flat, is not awoken from sleep sob. He did confirm that he has had significant weight loss over the last 6months of at least 20lb which he attributes to decreased appetite. 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 On further review, the patient does not report any blood per rectum or urine, no n/v/d, and reports that he moves well at home and still drives. 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 There has been a dry cough & chills w/o fever, sputum production, or chest pain and notes having flu vaccination this year. He noted to the ED LE swelling but does not endorse at time of admission. He is able to lay flat, is not awoken from sleep sob. He did confirm that he has had significant weight loss over the last 6months of at least 20lb which he attributes to decreased appetite. 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 On further review, the patient does not report any blood per rectum or urine, no n/v/d, and reports that he moves well at home and still drives.

## 2017-11-23 NOTE — DISCHARGE NOTE ADULT - PROVIDER TOKENS
MEÑO:'[id number]' TOKEN:'[id number]',FREE:[LAST:[Eric],FIRST:[Nae],PHONE:[phone]],FAX:[(   )    -],ADDRESS:[[address] [place] [address]]] TOKEN:'[id number]',FREE:[LAST:[Eric],FIRST:[Nae],PHONE:[phone]],FAX:[(   )    -],ADDRESS:[[address] [place] [address]]],TOKEN:'[id number]'

## 2017-11-23 NOTE — PROGRESS NOTE ADULT - PROBLEM SELECTOR PLAN 4
c/w losartan daily - chronic diastolic HF with no overt signs on exam, no orthopnea, or pnd.  - bnp elevated however lower than previous admissions  - f/u TTE for evaluation, CE neg in ED w/ no CP or exertional dyspnea -no pna on CT, afebrile, no wbc, will d/c levaquin for now  -if any signs of infection can restart ceftriaxone but would not use levaquin as has prolonged qtc  -f/u bcx, urine legionella

## 2017-11-23 NOTE — PROGRESS NOTE ADULT - PROBLEM SELECTOR PLAN 1
-possibly 2/2 PNA vs. CHF exacerbation vs. malignancy  -pt. afebrile without white count but admitted in January for similar symptoms without white count at the time with sx improvement with IV abx, now has dry cough, b/l opacification on cxr, c/w levaquin, f/u cx, legionella, and RVP  -hx of diastolic HF, but no orthopnea, PND, elevated BNP on admission but lower than previous, given one dose of lasix in ED  -in the setting of extensive smoking hx and weight loss, getting chest CT to r/o malignancy -DDx 2/2 PNA vs. CHF exacerbation vs. malignancy  -unlikely PNA, CXR unchanged from previous with b/l opacities, pt. afebrile without white count, dry cough on admission, not appreciated now, s/p levaquin - consider discontinuing etiology does not seem infectious   -hx of diastolic HF, but no orthopnea, PND, elevated BNP on admission but lower than previous, given one dose of lasix in ED, f/u TTE  -in the setting of extensive smoking hx and weight loss, getting chest CT to r/o malignancy -DDx 2/2 PNA vs. CHF exacerbation vs. malignancy vs. underlying lung disease  -unlikely PNA, CXR unchanged from previous with b/l opacities, pt. afebrile without white count, dry cough on admission, not appreciated now, s/p levaquin - consider discontinuing etiology does not seem infectious   -hx of diastolic HF, but no orthopnea, PND, elevated BNP on admission but lower than previous, given one dose of lasix in ED, f/u TTE  -in the setting of extensive smoking hx and weight loss  -f/u CT chest -DDx 2/2 PNA vs. CHF exacerbation vs. malignancy vs. underlying lung disease  -unlikely PNA, CXR unchanged from previous with b/l opacities, pt. afebrile without white count, dry cough on admission, not appreciated now, s/p levaquin - consider discontinuing etiology does not seem infectious   -hx of diastolic HF, but no orthopnea, PND, elevated BNP on admission but lower than previous, given one dose of lasix in ED, f/u TTE  -in the setting of extensive smoking hx and weight loss, need to r/o malignancy  -extensive smoking hx, possible exposure to chemicals as pt. is a [employer] vet, never dx with any lung disease - will follow up chest CT -DDx 2/2 PNA vs. CHF exacerbation vs. malignancy vs. underlying lung disease  -unlikely PNA, CXR unchanged from previous with b/l opacities, pt. afebrile without white count, dry cough on admission, not appreciated now, s/p levaquin - consider discontinuing etiology does not seem infectious   -hx of diastolic HF, but no orthopnea, PND, elevated BNP on admission but lower than previous, given one dose of lasix in ED, f/u TTE  -in the setting of extensive smoking hx and weight loss, need to r/o malignancy  -extensive smoking hx, possible exposure to chemicals as pt. is a [employer] vet, never dx with any lung disease - CT chest with pulmonary fibrosis -CT showing pulmonary fibrosis with pulmonary edema  -d/c antibiotics as no PNA, afebrile, no leukocytosis  -hx of diastolic HF, +BNP, CT with pulm edema on CT, crackles on exam  -will c/w lasix 20mg IV daily for now

## 2017-11-23 NOTE — H&P ADULT - PROBLEM SELECTOR PLAN 1
SOB likely 2/2pneumonia  - patient noted to have diastolic HF w/ elevated BNP on admit but lower than previous. no orthopnea, pnd, reported LE swelling in ED->not present on my exam after lasix?. never cp.  -patient has dry cough, generalized weakness, b/l rhonchi on exam, xray w/ b/l opacification. PSI score 124, class 4->hospitalization warranted. blood cx collected. will send legionella, will send rvp, will continue w/ levaquin dosing  - given age, smoking hx, previous hx in [facility] may represent malignancy with weight loss. SOB likely 2/2pneumonia  - patient noted to have diastolic HF w/ elevated BNP on admit but lower than previous. no orthopnea, pnd, reported LE swelling in ED->not present on my exam after lasix?. never cp.  -patient has dry cough, generalized weakness, b/l rhonchi on exam, xray w/ b/l opacification. PSI score 124, class 4->hospitalization warranted. blood cx collected. will send legionella, will send rvp, will continue w/ levaquin dosing  - given age, smoking hx, previous hx in [facility] may represent malignancy with weight loss.  -well score 0 less likely pe SOB likely 2/2pneumonia  - patient noted to have diastolic HF w/ elevated BNP on admit but lower than previous. no orthopnea, pnd, reported LE swelling in ED->not present on my exam after lasix?. never cp.  -patient has dry cough, generalized weakness, b/l rhonchi on exam, xray w/ b/l opacification. PSI score 124, class 4->hospitalization warranted. blood cx collected. will send legionella, will send rvp, will continue w/ levaquin dosing. will order incentive spirometry  - given age, smoking hx, previous hx in [place] may represent malignancy with weight loss.  -well score 0 less likely pe  - will get CT chest to better characterize to evaluate pna vs malignancy SOB likely 2/2pneumonia  - patient noted to have diastolic HF w/ elevated BNP on admit but lower than previous. no orthopnea, pnd, reported LE swelling in ED->not present on my exam after lasix?. never cp.  -patient has dry cough, generalized weakness, b/l rhonchi on exam, xray w/ b/l opacification. PSI score 124, class 4->hospitalization warranted. blood cx collected. will send legionella, will send rvp, will continue w/ levaquin dosing. will order incentive spirometry  - given age, smoking hx, previous hx in [place] may represent malignancy with weight loss.  -well score 0 less likely pe  - will get CT chest to better characterize to evaluate pna vs malignancy, doubt leslee pulmonary edema

## 2017-11-23 NOTE — DISCHARGE NOTE ADULT - CARE PLAN
Principal Discharge DX:	SOB (shortness of breath)  Goal:	follow up  Instructions for follow-up, activity and diet:	You presented to the hospital because you had difficulty taking in air. You were treated with ____. Please follow up with your primary care doctor after discharge.  Secondary Diagnosis:	Abdominal mass, RUQ (right upper quadrant)  Instructions for follow-up, activity and diet:	A CT clifford was done that showed ______. Please follow up with _____. Principal Discharge DX:	SOB (shortness of breath)  Goal:	follow up  Instructions for follow-up, activity and diet:	You presented to the hospital because you had difficulty taking in air. Your breathing improved with lasix which is a water pill. Please continue to take this medication when  you get home and follow up with your primary care doctor. You also were found to have lung disease on a CT scan, probably from many years of smoking. Please follow up with your primary care doctor and make an appointment with a lung doctor.  Secondary Diagnosis:	Abdominal mass, RUQ (right upper quadrant)  Goal:	follow up  Instructions for follow-up, activity and diet:	Your primary care doctor was concerned about an abdominal mass. An MRI was done. Please follow up with your primary care doctor about the results and further recommendations.  Secondary Diagnosis:	Heart failure  Instructions for follow-up, activity and diet:	A new medication called lasix was started for your heart failure. Please continue to take this medication and follow up with your primary care doctor. Principal Discharge DX:	SOB (shortness of breath)  Goal:	follow up  Instructions for follow-up, activity and diet:	You presented to the hospital because you had difficulty taking in air. Your breathing improved and you did not need antibiotics. You also were found to have lung disease on a CT scan, probably from many years of smoking. Please follow up with your primary care doctor and make an appointment with a lung doctor.  Secondary Diagnosis:	Abdominal mass, RUQ (right upper quadrant)  Goal:	follow up  Instructions for follow-up, activity and diet:	Your primary care doctor was concerned about an abdominal mass and weight loss. An MRI was done. Please follow up with your primary care doctor about the results and further recommendations. Principal Discharge DX:	SOB (shortness of breath)  Goal:	follow up  Instructions for follow-up, activity and diet:	You presented to the hospital because you had difficulty taking in air. Your breathing improved and you did not need antibiotics. You also were found to have pulmonary fibrosis and edema on a CT scan, probably from many years of smoking and related to heart failure. Your symptoms improved with Lasix however you must also follow up with your primary care doctor and make an appointment with a lung doctor for follow up.  Secondary Diagnosis:	Abdominal mass, RUQ (right upper quadrant)  Goal:	follow up  Instructions for follow-up, activity and diet:	Your primary care doctor was concerned about an abdominal mass and weight loss. An MRI was done that showed an enlarged gallbladder. Please follow up with your primary care doctor about the results and further recommendations as well as with general surgery. Principal Discharge DX:	SOB (shortness of breath)  Goal:	follow up  Instructions for follow-up, activity and diet:	You presented to the hospital because you had difficulty taking in air. Your breathing improved and you did not need antibiotics. You also were found to have pulmonary fibrosis and edema on a CT scan, probably from many years of smoking and related to heart failure. Your symptoms improved with Lasix however you must also follow up with your primary care doctor and make an appointment with a lung doctor for follow up.  Secondary Diagnosis:	Abdominal mass, RUQ (right upper quadrant)  Goal:	follow up  Instructions for follow-up, activity and diet:	Your primary care doctor was concerned about an abdominal mass and weight loss. An MRI was done that showed an enlarged gallbladder. Please follow up with Dr. Elder (surgeon) [phone] for outpatient surgery and removal of gallbladder.

## 2017-11-23 NOTE — PROGRESS NOTE ADULT - PROBLEM SELECTOR PLAN 5
c/w finasteride c/w losartan daily -normocytic, Hb 12, bl around 11-12  -will follow -normocytic, Hb 12, bl around 11-12  -monitor cbc

## 2017-11-23 NOTE — H&P ADULT - PMH
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

## 2017-11-23 NOTE — DISCHARGE NOTE ADULT - MEDICATION SUMMARY - MEDICATIONS TO TAKE
I will START or STAY ON the medications listed below when I get home from the hospital:    Proscar 5 mg oral tablet  -- 1 tab(s) by mouth once a day  -- Indication: For BPH (benign prostatic hyperplasia)    aspirin 81 mg oral tablet  -- 1 tab(s) by mouth once a day  -- Indication: For CAD    losartan 50 mg oral tablet  -- 1 tab(s) by mouth once a day  -- Indication: For HTN (hypertension)    Lipitor 10 mg oral tablet  -- 1 tab(s) by mouth once a day (at bedtime)  -- Indication: For HLD (hyperlipidemia)    Centrum Silver oral tablet  -- 1 tab(s) by mouth once a day  -- Indication: For Supplement I will START or STAY ON the medications listed below when I get home from the hospital:    Proscar 5 mg oral tablet  -- 1 tab(s) by mouth once a day  -- Indication: For BPH (benign prostatic hyperplasia)    aspirin 81 mg oral tablet  -- 1 tab(s) by mouth once a day  -- Indication: For CAD    losartan 50 mg oral tablet  -- 1 tab(s) by mouth once a day  -- Indication: For HTN (hypertension)    Lipitor 10 mg oral tablet  -- 1 tab(s) by mouth once a day (at bedtime)  -- Indication: For HLD (hyperlipidemia)    furosemide 40 mg oral tablet  -- 1 tab(s) by mouth once a day  -- Indication: For Heart failure    Centrum Silver oral tablet  -- 1 tab(s) by mouth once a day  -- Indication: For Supplement

## 2017-11-24 PROCEDURE — 99233 SBSQ HOSP IP/OBS HIGH 50: CPT | Mod: GC

## 2017-11-24 PROCEDURE — 74181 MRI ABDOMEN W/O CONTRAST: CPT | Mod: 26

## 2017-11-24 RX ORDER — FUROSEMIDE 40 MG
40 TABLET ORAL DAILY
Qty: 0 | Refills: 0 | Status: DISCONTINUED | OUTPATIENT
Start: 2017-11-24 | End: 2017-11-24

## 2017-11-24 RX ORDER — FUROSEMIDE 40 MG
1 TABLET ORAL
Qty: 30 | Refills: 0 | OUTPATIENT
Start: 2017-11-24 | End: 2017-12-24

## 2017-11-24 RX ADMIN — ENOXAPARIN SODIUM 40 MILLIGRAM(S): 100 INJECTION SUBCUTANEOUS at 05:54

## 2017-11-24 RX ADMIN — Medication 81 MILLIGRAM(S): at 12:54

## 2017-11-24 RX ADMIN — Medication 20 MILLIGRAM(S): at 05:54

## 2017-11-24 RX ADMIN — LOSARTAN POTASSIUM 50 MILLIGRAM(S): 100 TABLET, FILM COATED ORAL at 05:54

## 2017-11-24 RX ADMIN — Medication 1 TABLET(S): at 12:54

## 2017-11-24 RX ADMIN — FINASTERIDE 5 MILLIGRAM(S): 5 TABLET, FILM COATED ORAL at 12:54

## 2017-11-24 NOTE — PROGRESS NOTE ADULT - ASSESSMENT
89M w/ HFpEF, HTN, HLD, BPH, Bladder CA s/p resection, CVA w/o deficit, severe presbycusis presents to [facility] for 1d of "cant get good air" and is found to sob likely subacute w/ dry cough and accompanying weight loss possibly 2/2 pulmonary fibrosis, acute on chronic diastolic HF with pulmonary edema and ?underlying malignancy

## 2017-11-24 NOTE — PROGRESS NOTE ADULT - SUBJECTIVE AND OBJECTIVE BOX
Patient is a 89y old  Male who presents with a chief complaint of 89M c/o "cant get good air" (23 Nov 2017 11:58)    SUBJECTIVE / OVERNIGHT EVENTS: No acute events overnight. This morning pt. says he feels well, denies any fevers, chills, sob, cp, abdominal pain.     MEDICATIONS  (STANDING):  aspirin enteric coated 81 milliGRAM(s) Oral daily  atorvastatin 10 milliGRAM(s) Oral at bedtime  enoxaparin Injectable 40 milliGRAM(s) SubCutaneous every 24 hours  finasteride 5 milliGRAM(s) Oral daily  furosemide   Injectable 20 milliGRAM(s) IV Push daily  losartan 50 milliGRAM(s) Oral daily  multivitamin/minerals 1 Tablet(s) Oral daily    MEDICATIONS  (PRN):      Vital Signs Last 24 Hrs  T(C): 36.8 (24 Nov 2017 04:53), Max: 37 (23 Nov 2017 21:16)  T(F): 98.3 (24 Nov 2017 04:53), Max: 98.6 (23 Nov 2017 21:16)  HR: 86 (24 Nov 2017 04:53) (74 - 87)  BP: 121/76 (24 Nov 2017 04:53) (109/74 - 129/84)  BP(mean): --  RR: 18 (24 Nov 2017 04:53) (18 - 18)  SpO2: 97% (24 Nov 2017 04:53) (92% - 99%)  CAPILLARY BLOOD GLUCOSE        I&O's Summary    23 Nov 2017 07:01  -  24 Nov 2017 07:00  --------------------------------------------------------  IN: 360 mL / OUT: 1200 mL / NET: -840 mL    24 Nov 2017 07:01  -  24 Nov 2017 07:56  --------------------------------------------------------  IN: 0 mL / OUT: 325 mL / NET: -325 mL        PHYSICAL EXAM:  GENERAL: NAD, well-developed  HEAD:  Atraumatic, Normocephalic  EYES: EOMI, PERRLA, conjunctiva and sclera clear  NECK: Supple, No JVD  CHEST/LUNG: diffuse bibasilar crackles  HEART: Regular rate and rhythm; No murmurs, rubs, or gallops  ABDOMEN: Soft, Nontender, Nondistended; Bowel sounds present, abdominal mass in RUQ - nontender, smooth, nonfluctuant   EXTREMITIES:  2+ Peripheral Pulses, No clubbing, cyanosis, or edema  PSYCH: AAOx2  NEUROLOGY: non-focal  SKIN: No rashes or lesions    LABS:                        12.0   5.6   )-----------( 217      ( 23 Nov 2017 07:11 )             36.3     11-23    142  |  103  |  18  ----------------------------<  96  3.9   |  25  |  0.96    Ca    9.4      23 Nov 2017 07:11  Phos  3.3     11-23  Mg     1.7     11-23    TPro  7.3  /  Alb  3.8  /  TBili  0.7  /  DBili  x   /  AST  19  /  ALT  17  /  AlkPhos  87  11-22      CARDIAC MARKERS ( 23 Nov 2017 07:11 )  x     / <0.01 ng/mL / x     / x     / x      CARDIAC MARKERS ( 22 Nov 2017 19:57 )  x     / <0.01 ng/mL / 121 U/L / x     / 3.0 ng/mL          RADIOLOGY & ADDITIONAL TESTS:    Imaging Personally Reviewed:    Consultant(s) Notes Reviewed:      Care Discussed with Consultants/Other Providers:

## 2017-11-24 NOTE — PROGRESS NOTE ADULT - PROBLEM SELECTOR PLAN 4
-no pna on CT, afebrile, no wbc, will d/c levaquin for now  -if any signs of infection can restart ceftriaxone but would not use levaquin as has prolonged qtc  -f/u bcx, urine legionella

## 2017-11-24 NOTE — PROGRESS NOTE ADULT - PROBLEM SELECTOR PLAN 1
-CT showing pulmonary fibrosis with pulmonary edema  -d/c antibiotics as no PNA, afebrile, no leukocytosis  -hx of diastolic HF, +BNP, CT with pulm edema on CT, crackles on exam  -will c/w lasix 20mg IV daily for now

## 2017-11-24 NOTE — PROGRESS NOTE ADULT - PROBLEM SELECTOR PLAN 3
-pt. with 20lbs weight loss in past 6 months, follows Dr. Soni as an outpt. who is concerned for abdominal mass on exam, pt. never got outpt. CTAP  -CT chest with pulmonary fibrosis, upper abdomen view showing fluid filled gallbladder and liver cyst - will get MR abdomen to assess abd mass  -remote hx of bladder cancer, s/p TURP

## 2017-11-24 NOTE — PROGRESS NOTE ADULT - PROBLEM SELECTOR PLAN 2
-acute on chronic diastolic HF exacerbation with crackles, elevated BNP, and CT showing pulm edema  -c/w lasix 20mg IV daily  -c/w losartan 50mg daily  -monitor I and O

## 2017-11-25 VITALS
TEMPERATURE: 98 F | OXYGEN SATURATION: 96 % | HEART RATE: 76 BPM | RESPIRATION RATE: 18 BRPM | SYSTOLIC BLOOD PRESSURE: 115 MMHG | DIASTOLIC BLOOD PRESSURE: 66 MMHG

## 2017-11-25 DIAGNOSIS — K82.1 HYDROPS OF GALLBLADDER: ICD-10-CM

## 2017-11-25 LAB — LEGIONELLA AG UR QL: NEGATIVE — SIGNIFICANT CHANGE UP

## 2017-11-25 PROCEDURE — 85027 COMPLETE CBC AUTOMATED: CPT

## 2017-11-25 PROCEDURE — 82803 BLOOD GASES ANY COMBINATION: CPT

## 2017-11-25 PROCEDURE — 99239 HOSP IP/OBS DSCHRG MGMT >30: CPT

## 2017-11-25 PROCEDURE — 84443 ASSAY THYROID STIM HORMONE: CPT

## 2017-11-25 PROCEDURE — 85014 HEMATOCRIT: CPT

## 2017-11-25 PROCEDURE — 83605 ASSAY OF LACTIC ACID: CPT

## 2017-11-25 PROCEDURE — 83735 ASSAY OF MAGNESIUM: CPT

## 2017-11-25 PROCEDURE — 82553 CREATINE MB FRACTION: CPT

## 2017-11-25 PROCEDURE — 74181 MRI ABDOMEN W/O CONTRAST: CPT

## 2017-11-25 PROCEDURE — 82435 ASSAY OF BLOOD CHLORIDE: CPT

## 2017-11-25 PROCEDURE — 71045 X-RAY EXAM CHEST 1 VIEW: CPT

## 2017-11-25 PROCEDURE — 82550 ASSAY OF CK (CPK): CPT

## 2017-11-25 PROCEDURE — 82330 ASSAY OF CALCIUM: CPT

## 2017-11-25 PROCEDURE — 84484 ASSAY OF TROPONIN QUANT: CPT

## 2017-11-25 PROCEDURE — 71250 CT THORAX DX C-: CPT

## 2017-11-25 PROCEDURE — 80053 COMPREHEN METABOLIC PANEL: CPT

## 2017-11-25 PROCEDURE — 82947 ASSAY GLUCOSE BLOOD QUANT: CPT

## 2017-11-25 PROCEDURE — 99285 EMERGENCY DEPT VISIT HI MDM: CPT | Mod: 25

## 2017-11-25 PROCEDURE — 93005 ELECTROCARDIOGRAM TRACING: CPT | Mod: XU

## 2017-11-25 PROCEDURE — 80048 BASIC METABOLIC PNL TOTAL CA: CPT

## 2017-11-25 PROCEDURE — 87040 BLOOD CULTURE FOR BACTERIA: CPT

## 2017-11-25 PROCEDURE — 84100 ASSAY OF PHOSPHORUS: CPT

## 2017-11-25 PROCEDURE — 84295 ASSAY OF SERUM SODIUM: CPT

## 2017-11-25 PROCEDURE — 84132 ASSAY OF SERUM POTASSIUM: CPT

## 2017-11-25 PROCEDURE — 87449 NOS EACH ORGANISM AG IA: CPT

## 2017-11-25 PROCEDURE — 51701 INSERT BLADDER CATHETER: CPT

## 2017-11-25 PROCEDURE — 83880 ASSAY OF NATRIURETIC PEPTIDE: CPT

## 2017-11-25 RX ORDER — ACETAMINOPHEN 500 MG
650 TABLET ORAL EVERY 6 HOURS
Qty: 0 | Refills: 0 | Status: DISCONTINUED | OUTPATIENT
Start: 2017-11-25 | End: 2017-11-25

## 2017-11-25 RX ADMIN — Medication 650 MILLIGRAM(S): at 13:25

## 2017-11-25 RX ADMIN — Medication 81 MILLIGRAM(S): at 12:51

## 2017-11-25 RX ADMIN — ATORVASTATIN CALCIUM 10 MILLIGRAM(S): 80 TABLET, FILM COATED ORAL at 00:01

## 2017-11-25 RX ADMIN — LOSARTAN POTASSIUM 50 MILLIGRAM(S): 100 TABLET, FILM COATED ORAL at 06:07

## 2017-11-25 RX ADMIN — ENOXAPARIN SODIUM 40 MILLIGRAM(S): 100 INJECTION SUBCUTANEOUS at 06:07

## 2017-11-25 RX ADMIN — FINASTERIDE 5 MILLIGRAM(S): 5 TABLET, FILM COATED ORAL at 12:50

## 2017-11-25 RX ADMIN — Medication 1 TABLET(S): at 12:53

## 2017-11-25 RX ADMIN — Medication 650 MILLIGRAM(S): at 14:00

## 2017-11-25 NOTE — CONSULT NOTE ADULT - ASSESSMENT
89 yo male with newly diagnosed pulmonary fibrosis and hydropic gallbladder  -Recommend outpatient follow up for cholecystectomy  - 89 yo male with newly diagnosed pulmonary fibrosis and hydropic gallbladder.  -Patient asymptomatic at this time with normal labs. Have patient follow up as outpatient for planned cholecystectomy.  -Please have patient call Dr. Elder's office for surgery as outpatient. [phone].  -Discussed with Dr. Elder

## 2017-11-25 NOTE — PROGRESS NOTE ADULT - PROBLEM SELECTOR PLAN 2
-acute on chronic diastolic HF exacerbation with crackles, elevated BNP, and CT showing pulm edema  -pt appears to be euvolemic, stable O2 sat, dc lasix  -c/w losartan 50mg daily  -monitor I and O -acute on chronic diastolic HF exacerbation with crackles, elevated BNP, and CT showing pulm edema  -pt appears to be euvolemic, stable O2 sat,  -no lasix on d/c  -c/w losartan 50mg daily  -monitor I and O

## 2017-11-25 NOTE — PROGRESS NOTE ADULT - PROBLEM SELECTOR PLAN 1
-CT showing pulmonary fibrosis with pulmonary edema; improved with lasix  -euvolemic, SOB resolved, dc lasix.

## 2017-11-25 NOTE — PROGRESS NOTE ADULT - PROBLEM SELECTOR PLAN 3
-pt. with 20lbs weight loss in past 6 months, follows Dr. Soni as an outpt. who is concerned for abdominal mass on exam, pt. never got outpt. CTAP  -CT chest with pulmonary fibrosis, upper abdomen view showing fluid filled gallbladder and liver cyst - will get MR abdomen to assess abd mass  -remote hx of bladder cancer, s/p TURP  -MRI abdomen significant only for enlarged gallbladder.   -LFT wnl, no abdominal pain, no signs of cholecystitis. Would dc with outpt f/u. No signs on MRI concerning for malignancy reported. -pt. with 20lbs weight loss in past 6 months, follows Dr. Soni as an outpt. who is concerned for abdominal mass on exam, pt. never got outpt. CTAP  -CT chest with pulmonary fibrosis, upper abdomen view showing fluid filled gallbladder and liver cyst - will get MR abdomen to assess abd mass  -remote hx of bladder cancer, s/p TURP  -MRI abdomen significant only for enlarged gallbladder.   -afebrile, LFT wnl, no abdominal pain, no signs of cholecystitis. Would dc with outpt f/u. No signs on MRI concerning for malignancy reported.  -f/u surgery recommendations -pt. with 20lbs weight loss in past 6 months, follows Dr. Soni as an outpt. who is concerned for abdominal mass on exam,   -CT chest with pulmonary fibrosis, upper abdomen view showing fluid filled gallbladder and liver cyst -   - MR abdomen shows Hydropic gallbladder, measuring at least 15 cm, with a 2.0 cm stone in the cystic duct  -consulted surgery who rec outpatient followup  -remote hx of bladder cancer, s/p TURP  -MRI abdomen significant only for enlarged gallbladder.   -afebrile, LFT wnl, no abdominal pain, no signs of cholecystitis. Would dc with outpt f/u.

## 2017-11-25 NOTE — PROGRESS NOTE ADULT - SUBJECTIVE AND OBJECTIVE BOX
Shelly Khalil MD  PGY 2  Pager [phone]      Patient is a 89y old  Male who presents with a chief complaint of 89M c/o "cant get good air" (23 Nov 2017 11:58)    INTERVAL HPI/OVERNIGHT EVENTS: patient has episode of sundowning last night however was redirectable. This AM is sleepy but oriented, no complaints.     REVIEW OF SYSTEMS:  CONSTITUTIONAL: No fever, chills  ENMT:  No difficulty hearing, no change in vision  NECK: No pain or stiffness  RESPIRATORY: No cough, SOB  CARDIOVASCULAR: No chest pain, palpitations  GASTROINTESTINAL: No abdominal pain. No nausea, vomiting, or diarrhea  GENITOURINARY: No dysuria  NEUROLOGICAL: No HA  SKIN: No itching, burning, rashes, or lesions   LYMPH NODES: No enlarged glands  ENDOCRINE: No heat or cold intolerance; No hair loss  MUSCULOSKELETAL: No joint pain or swelling; No muscle, back, or extremity pain  PSYCHIATRIC: No depression, anxiety  HEME/LYMPH: No easy bruising, or bleeding gums    T(C): 36.5 (11-25-17 @ 04:56), Max: 36.5 (11-25-17 @ 04:56)  HR: 76 (11-25-17 @ 04:56) (74 - 80)  BP: 116/64 (11-25-17 @ 04:56) (101/64 - 116/64)  RR: 18 (11-25-17 @ 04:56) (18 - 18)  SpO2: 94% (11-25-17 @ 04:56) (91% - 95%)  Wt(kg): --Vital Signs Last 24 Hrs  T(C): 36.5 (25 Nov 2017 04:56), Max: 36.5 (25 Nov 2017 04:56)  T(F): 97.7 (25 Nov 2017 04:56), Max: 97.7 (25 Nov 2017 04:56)  HR: 76 (25 Nov 2017 04:56) (74 - 80)  BP: 116/64 (25 Nov 2017 04:56) (101/64 - 116/64)  BP(mean): --  RR: 18 (25 Nov 2017 04:56) (18 - 18)  SpO2: 94% (25 Nov 2017 04:56) (91% - 95%)    PHYSICAL EXAM:  GENERAL: NAD  EYES: clear conjunctiva; EOMI  ENMT: Moist mucous membranes  NECK: Supple, No JVD, Normal thyroid  CHEST/LUNG: bilateral dry crackles  HEART: S1, S2, Regular rate and rhythm  ABDOMEN: Soft, Nontender, ND, nontender mass palpated below right costal margin  NEURO: Alert & Oriented X3  EXTREMITIES: No LE edema, no calf tenderness  LYMPH: No lymphadenopathy noted  SKIN: No rashes or lesions    Consultant(s) Notes Reviewed:  [x ] YES  [ ] NO  Care Discussed with Consultants/Other Providers [ x] YES  [ ] NO    LABS:              CAPILLARY BLOOD GLUCOSE                RADIOLOGY & ADDITIONAL TESTS:    Imaging Personally Reviewed:  [ ] YES  [ ] NO Shelly Khalil MD  PGY 2  Pager [phone]      Patient is a 89y old  Male who presents with a chief complaint of 89M c/o "cant get good air" (23 Nov 2017 11:58)    INTERVAL HPI/OVERNIGHT EVENTS: patient has episode of sundowning last night however was redirectable. This AM feels well. no sob or abd pain, n/v.     REVIEW OF SYSTEMS:  CONSTITUTIONAL: No fever, chills  ENMT:  No difficulty hearing, no change in vision  NECK: No pain or stiffness  RESPIRATORY: No cough, SOB  CARDIOVASCULAR: No chest pain, palpitations  GASTROINTESTINAL: No abdominal pain. No nausea, vomiting, or diarrhea  GENITOURINARY: No dysuria  NEUROLOGICAL: No HA  SKIN: No itching, burning, rashes, or lesions   LYMPH NODES: No enlarged glands  ENDOCRINE: No heat or cold intolerance; No hair loss  MUSCULOSKELETAL: No joint pain or swelling; No muscle, back, or extremity pain  PSYCHIATRIC: No depression, anxiety  HEME/LYMPH: No easy bruising, or bleeding gums    T(C): 36.5 (11-25-17 @ 04:56), Max: 36.5 (11-25-17 @ 04:56)  HR: 76 (11-25-17 @ 04:56) (74 - 80)  BP: 116/64 (11-25-17 @ 04:56) (101/64 - 116/64)  RR: 18 (11-25-17 @ 04:56) (18 - 18)  SpO2: 94% (11-25-17 @ 04:56) (91% - 95%)  Wt(kg): --Vital Signs Last 24 Hrs  T(C): 36.5 (25 Nov 2017 04:56), Max: 36.5 (25 Nov 2017 04:56)  T(F): 97.7 (25 Nov 2017 04:56), Max: 97.7 (25 Nov 2017 04:56)  HR: 76 (25 Nov 2017 04:56) (74 - 80)  BP: 116/64 (25 Nov 2017 04:56) (101/64 - 116/64)  BP(mean): --  RR: 18 (25 Nov 2017 04:56) (18 - 18)  SpO2: 94% (25 Nov 2017 04:56) (91% - 95%)    PHYSICAL EXAM:  GENERAL: NAD  EYES: clear conjunctiva; EOMI  ENMT: Moist mucous membranes  NECK: Supple, No JVD, Normal thyroid  CHEST/LUNG: bilateral dry crackles  HEART: S1, S2, Regular rate and rhythm  ABDOMEN: Soft, Nontender, ND, nontender mass palpated below right costal margin  NEURO: Alert & Oriented X3  EXTREMITIES: No LE edema, no calf tenderness  LYMPH: No lymphadenopathy noted  SKIN: No rashes or lesions    Consultant(s) Notes Reviewed:  [x ] YES  [ ] NO  Care Discussed with Consultants/Other Providers [ x] YES  [ ] NO    LABS:        CAPILLARY BLOOD GLUCOSE      RADIOLOGY & ADDITIONAL TESTS:    Imaging Personally Reviewed:  [ x] YES  [ ] NO  MRI abd:   IMPRESSION: Markedly limited evaluation of the abdomen due to motion   artifact.    Hydropic gallbladder, measuring at least 15 cm, with a 2.0 cm stone in   the cystic duct.    No additional right upper quadrant mass is identified.

## 2017-11-25 NOTE — CONSULT NOTE ADULT - SUBJECTIVE AND OBJECTIVE BOX
General Surgery Consult  Consulting surgical team:  Consulting attending:    HPI:  89M w/ HFpEF, HTN, HLD, BPH, Bladder CA s/p resection, CVA w/o deficit, severe presbycusis presents to [facility] for 1d of "cant get good air." Patient reports that while making breakfast this morning he felt dizzy and could "not get good air." When asked if he get sob he responds that he does not but rather feels like he is not getting enough air in. There has been a dry cough & chills w/o fever, sputum production, or chest pain and notes having flu vaccination this year. He noted to the ED LE swelling but does not endorse at time of admission. He is able to lay flat, is not awoken from sleep sob. He did confirm that he has had significant weight loss over the last 6months of at least 20lb which he attributes to decreased appetite. 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 On further review, the patient does not report any blood per rectum or urine, no n/v/d, and reports that he moves well at home and still drives. (23 Nov 2017 01:31)    Interval events:   Patient sees a gastroenterologist that felt that he had a mass in his abdomen that he wanted to have evaluated while admitted. An MRI of the abdomen was obtained that demonstrated a large gallbladder with a stone in the cystic duct.      PAST MEDICAL HISTORY:  [place] (hard of hearing)  Hepatitis  CVA (cerebral vascular accident)  Neoplasm of bladder  BPH (benign prostatic hyperplasia)  HTN (hypertension)  Hyperlipidemia  CVA (cerebral infarction)  ASHD (arteriosclerotic heart disease)      PAST SURGICAL HISTORY:  S/P cataract surgery, right  S/P TURP (status post transurethral resection of prostate)  S/P tonsillectomy    MEDICATIONS:  aspirin enteric coated 81 milliGRAM(s) Oral daily  atorvastatin 10 milliGRAM(s) Oral at bedtime  enoxaparin Injectable 40 milliGRAM(s) SubCutaneous every 24 hours  finasteride 5 milliGRAM(s) Oral daily  losartan 50 milliGRAM(s) Oral daily  multivitamin/minerals 1 Tablet(s) Oral daily      ALLERGIES:  penicillin (Anaphylaxis)      VITALS & I/Os:  Vital Signs Last 24 Hrs  T(C): 36.5 (25 Nov 2017 04:56), Max: 36.5 (25 Nov 2017 04:56)  T(F): 97.7 (25 Nov 2017 04:56), Max: 97.7 (25 Nov 2017 04:56)  HR: 76 (25 Nov 2017 04:56) (74 - 80)  BP: 116/64 (25 Nov 2017 04:56) (101/64 - 116/64)  RR: 18 (25 Nov 2017 04:56) (18 - 18)  SpO2: 94% (25 Nov 2017 04:56) (91% - 95%)    I&O's Summary    24 Nov 2017 07:01  -  25 Nov 2017 07:00  --------------------------------------------------------  IN: 360 mL / OUT: 327 mL / NET: 33 mL    PHYSICAL EXAM:  General: No acute distress  Respiratory: Nonlabored  Cardiovascular: RRR  Abdominal: Soft, nondistended, nontender  Extremities: Warm    IMAGING:  MR Abdomen No Cont (11.24.17 @ 15:57)  Hydropic gallbladder, measuring at least 15 cm, with a 2.0 cm stone in   the cystic duct. General Surgery Consult  Consulting surgical team: ATP [id number]  Consulting attending: Dr. Elder    HPI:  89M w/ HFpEF, HTN, HLD, BPH, Bladder CA s/p resection, CVA w/o deficit, severe presbycusis presents to [facility] for 1d of "cant get good air." Patient reports that while making breakfast this morning he felt dizzy and could "not get good air." When asked if he get sob he responds that he does not but rather feels like he is not getting enough air in. There has been a dry cough & chills w/o fever, sputum production, or chest pain and notes having flu vaccination this year. He noted to the ED LE swelling but does not endorse at time of admission. He is able to lay flat, is not awoken from sleep sob. He did confirm that he has had significant weight loss over the last 6months of at least 20lb which he attributes to decreased appetite. 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 On further review, the patient does not report any blood per rectum or urine, no n/v/d, and reports that he moves well at home and still drives. (23 Nov 2017 01:31)    Interval events:   Patient sees a gastroenterologist that felt that he had a mass in his abdomen that he wanted to have evaluated while admitted. An MRI of the abdomen was obtained that demonstrated a large gallbladder with a stone in the cystic duct.      PAST MEDICAL HISTORY:  [place] (hard of hearing)  Hepatitis  CVA (cerebral vascular accident)  Neoplasm of bladder  BPH (benign prostatic hyperplasia)  HTN (hypertension)  Hyperlipidemia  CVA (cerebral infarction)  ASHD (arteriosclerotic heart disease)      PAST SURGICAL HISTORY:  S/P cataract surgery, right  S/P TURP (status post transurethral resection of prostate)  S/P tonsillectomy    MEDICATIONS:  aspirin enteric coated 81 milliGRAM(s) Oral daily  atorvastatin 10 milliGRAM(s) Oral at bedtime  enoxaparin Injectable 40 milliGRAM(s) SubCutaneous every 24 hours  finasteride 5 milliGRAM(s) Oral daily  losartan 50 milliGRAM(s) Oral daily  multivitamin/minerals 1 Tablet(s) Oral daily      ALLERGIES:  penicillin (Anaphylaxis)      VITALS & I/Os:  Vital Signs Last 24 Hrs  T(C): 36.5 (25 Nov 2017 04:56), Max: 36.5 (25 Nov 2017 04:56)  T(F): 97.7 (25 Nov 2017 04:56), Max: 97.7 (25 Nov 2017 04:56)  HR: 76 (25 Nov 2017 04:56) (74 - 80)  BP: 116/64 (25 Nov 2017 04:56) (101/64 - 116/64)  RR: 18 (25 Nov 2017 04:56) (18 - 18)  SpO2: 94% (25 Nov 2017 04:56) (91% - 95%)    I&O's Summary    24 Nov 2017 07:01  -  25 Nov 2017 07:00  --------------------------------------------------------  IN: 360 mL / OUT: 327 mL / NET: 33 mL    PHYSICAL EXAM:  General: No acute distress  Respiratory: Nonlabored  Cardiovascular: RRR  Abdominal: Soft, nondistended, nontender  Extremities: Warm    IMAGING:  MR Abdomen No Cont (11.24.17 @ 15:57)  Hydropic gallbladder, measuring at least 15 cm, with a 2.0 cm stone in   the cystic duct.

## 2017-11-25 NOTE — PROGRESS NOTE ADULT - ATTENDING COMMENTS
Right sided abd mass, exam c/w hepatomegaly, possible cyst, for MRI for further clarification.  D/c planning, total d/c time 35 minutes
feels wwell no sob or chest pain. no abd pain or fevers  HD stable  MRI shows Hydropic gallbladder, measuring at least 15 cm, with a 2.0 cm stone in the cystic duct  Surgery consulted and recomemnded outpatient followup with Dr. Bhandari  discharge home pcp and surgery followup  discharge time 40 min

## 2017-11-25 NOTE — PROGRESS NOTE ADULT - ASSESSMENT
89M with HFpEF, HTN, HLD, BPH, Bladder CA s/p resection, CVA w/o deficit, severe presbycusis presents to [facility] SOB which has now resolved, sent in by PMD for abdomnial mass s/p CT Chest showing pulmonary fibrosis and ABD MRI showing enlarged gallbladder. Pt had c/o weight loss likely 2/2 pulmonary fibrosis; stable for discharge today.

## 2017-11-27 LAB
25(OH)D3 SERPL-MCNC: 29.1 NG/ML
ALBUMIN SERPL ELPH-MCNC: 3.4 G/DL
ALP BLD-CCNC: 81 U/L
ALT SERPL-CCNC: 7 U/L
AMYLASE/CREAT SERPL: 65 U/L
ANION GAP SERPL CALC-SCNC: 15 MMOL/L
AST SERPL-CCNC: 21 U/L
BASOPHILS # BLD AUTO: 0.02 K/UL
BASOPHILS NFR BLD AUTO: 0.4 %
BILIRUB SERPL-MCNC: 0.5 MG/DL
BUN SERPL-MCNC: 20 MG/DL
CEA SERPL-MCNC: 2.2 NG/ML
CHLORIDE SERPL-SCNC: 103 MMOL/L
CHOLEST SERPL-MCNC: 111 MG/DL
CHOLEST/HDLC SERPL: 3.2 RATIO
CO2 SERPL-SCNC: 23 MMOL/L
CREAT SERPL-MCNC: 0.98 MG/DL
CREAT SPEC-SCNC: 199 MG/DL
CREAT/PROT UR: 0.2 RATIO
EOSINOPHIL # BLD AUTO: 0.19 K/UL
EOSINOPHIL NFR BLD AUTO: 4.1 %
GLUCOSE SERPL-MCNC: 98 MG/DL
HCT VFR BLD CALC: 35.5 %
HDLC SERPL-MCNC: 35 MG/DL
HGB BLD-MCNC: 11.1 G/DL
IMM GRANULOCYTES NFR BLD AUTO: 0 %
LDLC SERPL CALC-MCNC: 58 MG/DL
LPL SERPL-CCNC: 28 U/L
LYMPHOCYTES # BLD AUTO: 1.42 K/UL
LYMPHOCYTES NFR BLD AUTO: 30.3 %
MAN DIFF?: NORMAL
MCHC RBC-ENTMCNC: 30.7 PG
MCHC RBC-ENTMCNC: 31.3 GM/DL
MCV RBC AUTO: 98.1 FL
MONOCYTES # BLD AUTO: 0.41 K/UL
MONOCYTES NFR BLD AUTO: 8.8 %
NEUTROPHILS # BLD AUTO: 2.64 K/UL
NEUTROPHILS NFR BLD AUTO: 56.4 %
PLATELET # BLD AUTO: 246 K/UL
POTASSIUM SERPL-SCNC: 4 MMOL/L
PROT SERPL-MCNC: 7.2 G/DL
PROT UR-MCNC: 48 MG/DL
RBC # BLD: 3.62 M/UL
RBC # FLD: 15.8 %
SODIUM SERPL-SCNC: 141 MMOL/L
TRIGL SERPL-MCNC: 88 MG/DL
TSH SERPL-ACNC: 5.31 UIU/ML
WBC # FLD AUTO: 4.68 K/UL

## 2017-12-04 LAB — CALCIUM SERPL-MCNC: 9.3 MG/DL

## 2017-12-16 ENCOUNTER — APPOINTMENT (OUTPATIENT)
Dept: MRI IMAGING | Facility: IMAGING CENTER | Age: 82
End: 2017-12-16

## 2017-12-19 ENCOUNTER — APPOINTMENT (OUTPATIENT)
Dept: INTERNAL MEDICINE | Facility: CLINIC | Age: 82
End: 2017-12-19

## 2018-01-11 ENCOUNTER — RESULT CHARGE (OUTPATIENT)
Age: 83
End: 2018-01-11

## 2018-01-11 ENCOUNTER — APPOINTMENT (OUTPATIENT)
Dept: INTERNAL MEDICINE | Facility: CLINIC | Age: 83
End: 2018-01-11
Payer: MEDICARE

## 2018-01-11 ENCOUNTER — LABORATORY RESULT (OUTPATIENT)
Age: 83
End: 2018-01-11

## 2018-01-11 VITALS
WEIGHT: 166 LBS | HEART RATE: 101 BPM | HEIGHT: 65 IN | SYSTOLIC BLOOD PRESSURE: 122 MMHG | DIASTOLIC BLOOD PRESSURE: 80 MMHG | BODY MASS INDEX: 27.66 KG/M2

## 2018-01-11 VITALS — HEART RATE: 83 BPM | OXYGEN SATURATION: 96 %

## 2018-01-11 DIAGNOSIS — R60.0 LOCALIZED EDEMA: ICD-10-CM

## 2018-01-11 DIAGNOSIS — R06.02 SHORTNESS OF BREATH: ICD-10-CM

## 2018-01-11 DIAGNOSIS — Z00.00 ENCOUNTER FOR GENERAL ADULT MEDICAL EXAMINATION W/OUT ABNORMAL FINDINGS: ICD-10-CM

## 2018-01-11 LAB — O2 SATURATION: 96

## 2018-01-11 PROCEDURE — 99214 OFFICE O/P EST MOD 30 MIN: CPT | Mod: 25

## 2018-01-11 PROCEDURE — 36415 COLL VENOUS BLD VENIPUNCTURE: CPT

## 2018-01-12 LAB
ALBUMIN SERPL ELPH-MCNC: 3.6 G/DL
ALP BLD-CCNC: 87 U/L
ALT SERPL-CCNC: 16 U/L
ANION GAP SERPL CALC-SCNC: 18 MMOL/L
AST SERPL-CCNC: 22 U/L
BASOPHILS # BLD AUTO: 0.03 K/UL
BASOPHILS NFR BLD AUTO: 0.7 %
BILIRUB SERPL-MCNC: 1.9 MG/DL
BUN SERPL-MCNC: 22 MG/DL
CALCIUM SERPL-MCNC: 9.3 MG/DL
CHLORIDE SERPL-SCNC: 106 MMOL/L
CO2 SERPL-SCNC: 22 MMOL/L
CREAT SERPL-MCNC: 0.96 MG/DL
EOSINOPHIL # BLD AUTO: 0.15 K/UL
EOSINOPHIL NFR BLD AUTO: 3.4 %
GLUCOSE SERPL-MCNC: 88 MG/DL
HCT VFR BLD CALC: 34.3 %
HGB BLD-MCNC: 10.6 G/DL
IMM GRANULOCYTES NFR BLD AUTO: 0 %
LYMPHOCYTES # BLD AUTO: 1.31 K/UL
LYMPHOCYTES NFR BLD AUTO: 29.7 %
MAN DIFF?: NORMAL
MCHC RBC-ENTMCNC: 30.9 GM/DL
MCHC RBC-ENTMCNC: 30.9 PG
MCV RBC AUTO: 100 FL
MONOCYTES # BLD AUTO: 0.4 K/UL
MONOCYTES NFR BLD AUTO: 9.1 %
NEUTROPHILS # BLD AUTO: 2.52 K/UL
NEUTROPHILS NFR BLD AUTO: 57.1 %
NT-PROBNP SERPL-MCNC: 4806 PG/ML
PLATELET # BLD AUTO: 268 K/UL
POTASSIUM SERPL-SCNC: 4.9 MMOL/L
PROT SERPL-MCNC: 7.1 G/DL
RBC # BLD: 3.43 M/UL
RBC # FLD: 18.7 %
SODIUM SERPL-SCNC: 146 MMOL/L
WBC # FLD AUTO: 4.41 K/UL

## 2018-01-23 ENCOUNTER — APPOINTMENT (OUTPATIENT)
Dept: INTERNAL MEDICINE | Facility: CLINIC | Age: 83
End: 2018-01-23

## 2018-01-30 ENCOUNTER — RX RENEWAL (OUTPATIENT)
Age: 83
End: 2018-01-30

## 2018-02-07 ENCOUNTER — OTHER (OUTPATIENT)
Age: 83
End: 2018-02-07

## 2018-02-14 ENCOUNTER — INPATIENT (INPATIENT)
Facility: HOSPITAL | Age: 83
LOS: 5 days | Discharge: HOME | DRG: 291 | End: 2018-02-20
Attending: HOSPITALIST | Admitting: INTERNAL MEDICINE
Payer: MEDICARE

## 2018-02-14 VITALS
DIASTOLIC BLOOD PRESSURE: 96 MMHG | TEMPERATURE: 98 F | HEART RATE: 93 BPM | SYSTOLIC BLOOD PRESSURE: 134 MMHG | OXYGEN SATURATION: 96 % | RESPIRATION RATE: 18 BRPM

## 2018-02-14 DIAGNOSIS — Z98.41 CATARACT EXTRACTION STATUS, RIGHT EYE: Chronic | ICD-10-CM

## 2018-02-14 DIAGNOSIS — I50.9 HEART FAILURE, UNSPECIFIED: ICD-10-CM

## 2018-02-14 LAB
ALBUMIN SERPL ELPH-MCNC: 3.4 G/DL — SIGNIFICANT CHANGE UP (ref 3.3–5)
ALP SERPL-CCNC: 92 U/L — SIGNIFICANT CHANGE UP (ref 40–120)
ALT FLD-CCNC: 21 U/L RC — SIGNIFICANT CHANGE UP (ref 10–45)
ANION GAP SERPL CALC-SCNC: 16 MMOL/L — SIGNIFICANT CHANGE UP (ref 5–17)
APTT BLD: 30 SEC — SIGNIFICANT CHANGE UP (ref 27.5–37.4)
AST SERPL-CCNC: 29 U/L — SIGNIFICANT CHANGE UP (ref 10–40)
BASE EXCESS BLDV CALC-SCNC: -1.8 MMOL/L — SIGNIFICANT CHANGE UP (ref -2–2)
BILIRUB SERPL-MCNC: 0.7 MG/DL — SIGNIFICANT CHANGE UP (ref 0.2–1.2)
BUN SERPL-MCNC: 22 MG/DL — SIGNIFICANT CHANGE UP (ref 7–23)
CA-I SERPL-SCNC: 1.02 MMOL/L — LOW (ref 1.12–1.3)
CALCIUM SERPL-MCNC: 9 MG/DL — SIGNIFICANT CHANGE UP (ref 8.4–10.5)
CHLORIDE BLDV-SCNC: 107 MMOL/L — SIGNIFICANT CHANGE UP (ref 96–108)
CHLORIDE SERPL-SCNC: 104 MMOL/L — SIGNIFICANT CHANGE UP (ref 96–108)
CK MB BLD-MCNC: 3.3 % — SIGNIFICANT CHANGE UP (ref 0–3.5)
CK MB CFR SERPL CALC: 4 NG/ML — SIGNIFICANT CHANGE UP (ref 0–6.7)
CK SERPL-CCNC: 120 U/L — SIGNIFICANT CHANGE UP (ref 30–200)
CO2 BLDV-SCNC: 24 MMOL/L — SIGNIFICANT CHANGE UP (ref 22–30)
CO2 SERPL-SCNC: 18 MMOL/L — LOW (ref 22–31)
CREAT SERPL-MCNC: 0.94 MG/DL — SIGNIFICANT CHANGE UP (ref 0.5–1.3)
GAS PNL BLDV: 130 MMOL/L — LOW (ref 136–145)
GAS PNL BLDV: SIGNIFICANT CHANGE UP
GLUCOSE BLDV-MCNC: 87 MG/DL — SIGNIFICANT CHANGE UP (ref 70–99)
GLUCOSE SERPL-MCNC: 85 MG/DL — SIGNIFICANT CHANGE UP (ref 70–99)
HCO3 BLDV-SCNC: 23 MMOL/L — SIGNIFICANT CHANGE UP (ref 21–29)
HCT VFR BLD CALC: 36.8 % — LOW (ref 39–50)
HCT VFR BLDA CALC: 38 % — LOW (ref 39–50)
HGB BLD CALC-MCNC: 12.3 G/DL — LOW (ref 13–17)
HGB BLD-MCNC: 11.8 G/DL — LOW (ref 13–17)
INR BLD: 1.2 RATIO — HIGH (ref 0.88–1.16)
LACTATE BLDV-MCNC: 1.8 MMOL/L — SIGNIFICANT CHANGE UP (ref 0.7–2)
MCHC RBC-ENTMCNC: 31.4 PG — SIGNIFICANT CHANGE UP (ref 27–34)
MCHC RBC-ENTMCNC: 31.9 GM/DL — LOW (ref 32–36)
MCV RBC AUTO: 98.4 FL — SIGNIFICANT CHANGE UP (ref 80–100)
NT-PROBNP SERPL-SCNC: 6506 PG/ML — HIGH (ref 0–300)
PCO2 BLDV: 42 MMHG — SIGNIFICANT CHANGE UP (ref 35–50)
PH BLDV: 7.36 — SIGNIFICANT CHANGE UP (ref 7.35–7.45)
PLATELET # BLD AUTO: 215 K/UL — SIGNIFICANT CHANGE UP (ref 150–400)
PO2 BLDV: 36 MMHG — SIGNIFICANT CHANGE UP (ref 25–45)
POTASSIUM BLDV-SCNC: 10.3 MMOL/L — CRITICAL HIGH (ref 3.5–5)
POTASSIUM SERPL-MCNC: 4.1 MMOL/L — SIGNIFICANT CHANGE UP (ref 3.5–5.3)
POTASSIUM SERPL-SCNC: 4.1 MMOL/L — SIGNIFICANT CHANGE UP (ref 3.5–5.3)
PROT SERPL-MCNC: 7.3 G/DL — SIGNIFICANT CHANGE UP (ref 6–8.3)
PROTHROM AB SERPL-ACNC: 13.1 SEC — HIGH (ref 9.8–12.7)
RBC # BLD: 3.74 M/UL — LOW (ref 4.2–5.8)
RBC # FLD: 15.2 % — HIGH (ref 10.3–14.5)
SAO2 % BLDV: 62 % — LOW (ref 67–88)
SODIUM SERPL-SCNC: 138 MMOL/L — SIGNIFICANT CHANGE UP (ref 135–145)
TROPONIN T SERPL-MCNC: <0.01 NG/ML — SIGNIFICANT CHANGE UP (ref 0–0.06)
WBC # BLD: 4.2 K/UL — SIGNIFICANT CHANGE UP (ref 3.8–10.5)
WBC # FLD AUTO: 4.2 K/UL — SIGNIFICANT CHANGE UP (ref 3.8–10.5)

## 2018-02-14 PROCEDURE — 71045 X-RAY EXAM CHEST 1 VIEW: CPT | Mod: 26

## 2018-02-14 PROCEDURE — 93010 ELECTROCARDIOGRAM REPORT: CPT

## 2018-02-14 PROCEDURE — 99285 EMERGENCY DEPT VISIT HI MDM: CPT | Mod: 25,GC

## 2018-02-14 RX ORDER — ATORVASTATIN CALCIUM 80 MG/1
1 TABLET, FILM COATED ORAL
Qty: 0 | Refills: 0 | COMMUNITY

## 2018-02-14 RX ORDER — FUROSEMIDE 40 MG
40 TABLET ORAL ONCE
Qty: 0 | Refills: 0 | Status: COMPLETED | OUTPATIENT
Start: 2018-02-14 | End: 2018-02-14

## 2018-02-14 RX ADMIN — Medication 40 MILLIGRAM(S): at 20:21

## 2018-02-14 NOTE — ED PROVIDER NOTE - CARE PLAN
Assessment and plan of treatment:	89 M pmh of CHF p/w SOB/PRITCHARD, LE danyign likely 2/2 CHF exacerbation. Will send CBC,CMP,Sherrie, proBNP, EKG, CXR. will reassess after labs return. Principal Discharge DX:	Acute decompensated heart failure

## 2018-02-14 NOTE — ED PROVIDER NOTE - OBJECTIVE STATEMENT
light headed, sob, loss of appetite with wt loss (15-30lbs over past 6mos.)  worse with movement  pt states LE swelling x4-5 weeks.  Pt denies orthopnea, only 2 pillows like previously in life  Pt former smoker x20 pk/yrs    PCP: Lizbeth 89 M PMH of HFpEF, HTN, HLD, BPH, Bladder CA s/p resection, CVA w/o deficit, [place] presenting with light headed, sob, loss of appetite with wt loss (15-30lbs over past 6mos.). Pt states he has had progressive SOB over the past few months with associated PRITCHARD. Pt state he has noticed LE swelling x4-5 weeks. Pt endoreses dry cough. Pt denies orthopnea, only 2 pillows like previously in life. Pt denies any fevers, chills, N/V, D/C, sore throat or urinary symptoms. Pt former smoker x20 pk/yrs.    PCP: Lizbeth  Cardiologist: Celso

## 2018-02-14 NOTE — ED PROVIDER NOTE - PMH
ASHD (arteriosclerotic heart disease)    BPH (benign prostatic hyperplasia)    CVA (cerebral infarction)    Hepatitis  per son, contracted hepatitis from tattoo he received in his teens - unsure which type  [place] (hard of hearing)  right hearing aid  HTN (hypertension)    Hyperlipidemia    Neoplasm of bladder

## 2018-02-14 NOTE — ED PROVIDER NOTE - MEDICAL DECISION MAKING DETAILS
89 M pmh of CHF p/w SOB/PRITCHARD, LE swellign likely 2/2 CHF exacerbation. Will send CBC,CMP,Sherrie, proBNP, EKG, CXR. will reassess after labs return.

## 2018-02-14 NOTE — ED ADULT NURSE NOTE - OBJECTIVE STATEMENT
Pt BIBA from home for "weakness."  LE swelling and PRITCHARD with "heaving cough" x 1 week.  Additionally reports that he has lost 15-30 lbs over the past 6 months due to decreased appetite.  Pt is conversive, abd soft/nt/nd, +LE +3 edema, skin wdi, denies cp, sob at rest, fevers, nvd, urinary symptoms, falls.

## 2018-02-14 NOTE — ED ADULT NURSE NOTE - PMH
ASHD (arteriosclerotic heart disease)    BPH (benign prostatic hyperplasia)    CVA (cerebral infarction)    Hepatitis  per son, contracted hepatitis from tattoo he received in his teens - unsure which type  [place] (hard of hearing)  right hearing aid  HTN (hypertension)    Hyperlipidemia    Neoplasm of bladder

## 2018-02-15 DIAGNOSIS — E78.5 HYPERLIPIDEMIA, UNSPECIFIED: ICD-10-CM

## 2018-02-15 DIAGNOSIS — Z29.9 ENCOUNTER FOR PROPHYLACTIC MEASURES, UNSPECIFIED: ICD-10-CM

## 2018-02-15 DIAGNOSIS — Z79.899 OTHER LONG TERM (CURRENT) DRUG THERAPY: ICD-10-CM

## 2018-02-15 DIAGNOSIS — N40.0 BENIGN PROSTATIC HYPERPLASIA WITHOUT LOWER URINARY TRACT SYMPTOMS: ICD-10-CM

## 2018-02-15 DIAGNOSIS — I50.9 HEART FAILURE, UNSPECIFIED: ICD-10-CM

## 2018-02-15 DIAGNOSIS — I63.9 CEREBRAL INFARCTION, UNSPECIFIED: ICD-10-CM

## 2018-02-15 DIAGNOSIS — D49.4 NEOPLASM OF UNSPECIFIED BEHAVIOR OF BLADDER: ICD-10-CM

## 2018-02-15 DIAGNOSIS — I10 ESSENTIAL (PRIMARY) HYPERTENSION: ICD-10-CM

## 2018-02-15 LAB
ANION GAP SERPL CALC-SCNC: 11 MMOL/L — SIGNIFICANT CHANGE UP (ref 5–17)
BUN SERPL-MCNC: 23 MG/DL — SIGNIFICANT CHANGE UP (ref 7–23)
CALCIUM SERPL-MCNC: 9.1 MG/DL — SIGNIFICANT CHANGE UP (ref 8.4–10.5)
CHLORIDE SERPL-SCNC: 102 MMOL/L — SIGNIFICANT CHANGE UP (ref 96–108)
CK MB BLD-MCNC: 3 % — SIGNIFICANT CHANGE UP (ref 0–3.5)
CK MB CFR SERPL CALC: 3.2 NG/ML — SIGNIFICANT CHANGE UP (ref 0–6.7)
CK SERPL-CCNC: 106 U/L — SIGNIFICANT CHANGE UP (ref 30–200)
CO2 SERPL-SCNC: 28 MMOL/L — SIGNIFICANT CHANGE UP (ref 22–31)
CREAT SERPL-MCNC: 1.06 MG/DL — SIGNIFICANT CHANGE UP (ref 0.5–1.3)
GLUCOSE SERPL-MCNC: 119 MG/DL — HIGH (ref 70–99)
POTASSIUM SERPL-MCNC: 3.8 MMOL/L — SIGNIFICANT CHANGE UP (ref 3.5–5.3)
POTASSIUM SERPL-SCNC: 3.8 MMOL/L — SIGNIFICANT CHANGE UP (ref 3.5–5.3)
RAPID RVP RESULT: SIGNIFICANT CHANGE UP
SODIUM SERPL-SCNC: 141 MMOL/L — SIGNIFICANT CHANGE UP (ref 135–145)
TROPONIN T SERPL-MCNC: 0.01 NG/ML — SIGNIFICANT CHANGE UP (ref 0–0.06)

## 2018-02-15 PROCEDURE — 99223 1ST HOSP IP/OBS HIGH 75: CPT | Mod: GC

## 2018-02-15 PROCEDURE — 12345: CPT | Mod: NC

## 2018-02-15 PROCEDURE — 93306 TTE W/DOPPLER COMPLETE: CPT | Mod: 26

## 2018-02-15 PROCEDURE — 93010 ELECTROCARDIOGRAM REPORT: CPT

## 2018-02-15 RX ORDER — ASPIRIN/CALCIUM CARB/MAGNESIUM 324 MG
81 TABLET ORAL DAILY
Qty: 0 | Refills: 0 | Status: DISCONTINUED | OUTPATIENT
Start: 2018-02-15 | End: 2018-02-20

## 2018-02-15 RX ORDER — FINASTERIDE 5 MG/1
5 TABLET, FILM COATED ORAL DAILY
Qty: 0 | Refills: 0 | Status: DISCONTINUED | OUTPATIENT
Start: 2018-02-15 | End: 2018-02-20

## 2018-02-15 RX ORDER — LOSARTAN POTASSIUM 100 MG/1
50 TABLET, FILM COATED ORAL DAILY
Qty: 0 | Refills: 0 | Status: DISCONTINUED | OUTPATIENT
Start: 2018-02-15 | End: 2018-02-20

## 2018-02-15 RX ORDER — ATORVASTATIN CALCIUM 80 MG/1
10 TABLET, FILM COATED ORAL AT BEDTIME
Qty: 0 | Refills: 0 | Status: DISCONTINUED | OUTPATIENT
Start: 2018-02-15 | End: 2018-02-20

## 2018-02-15 RX ORDER — FUROSEMIDE 40 MG
40 TABLET ORAL
Qty: 0 | Refills: 0 | Status: DISCONTINUED | OUTPATIENT
Start: 2018-02-15 | End: 2018-02-18

## 2018-02-15 RX ORDER — HEPARIN SODIUM 5000 [USP'U]/ML
5000 INJECTION INTRAVENOUS; SUBCUTANEOUS EVERY 8 HOURS
Qty: 0 | Refills: 0 | Status: DISCONTINUED | OUTPATIENT
Start: 2018-02-15 | End: 2018-02-20

## 2018-02-15 RX ORDER — POTASSIUM CHLORIDE 20 MEQ
10 PACKET (EA) ORAL ONCE
Qty: 0 | Refills: 0 | Status: COMPLETED | OUTPATIENT
Start: 2018-02-15 | End: 2018-02-15

## 2018-02-15 RX ADMIN — HEPARIN SODIUM 5000 UNIT(S): 5000 INJECTION INTRAVENOUS; SUBCUTANEOUS at 13:16

## 2018-02-15 RX ADMIN — Medication 1 TABLET(S): at 10:00

## 2018-02-15 RX ADMIN — Medication 81 MILLIGRAM(S): at 10:00

## 2018-02-15 RX ADMIN — Medication 40 MILLIGRAM(S): at 05:27

## 2018-02-15 RX ADMIN — Medication 10 MILLIEQUIVALENT(S): at 17:43

## 2018-02-15 RX ADMIN — LOSARTAN POTASSIUM 50 MILLIGRAM(S): 100 TABLET, FILM COATED ORAL at 05:27

## 2018-02-15 RX ADMIN — Medication 40 MILLIGRAM(S): at 17:43

## 2018-02-15 RX ADMIN — HEPARIN SODIUM 5000 UNIT(S): 5000 INJECTION INTRAVENOUS; SUBCUTANEOUS at 05:28

## 2018-02-15 RX ADMIN — FINASTERIDE 5 MILLIGRAM(S): 5 TABLET, FILM COATED ORAL at 10:00

## 2018-02-15 RX ADMIN — ATORVASTATIN CALCIUM 10 MILLIGRAM(S): 80 TABLET, FILM COATED ORAL at 21:49

## 2018-02-15 RX ADMIN — HEPARIN SODIUM 5000 UNIT(S): 5000 INJECTION INTRAVENOUS; SUBCUTANEOUS at 21:49

## 2018-02-15 NOTE — H&P ADULT - HISTORY OF PRESENT ILLNESS
Patient is a 89 yo M w/PMH of HFpEF (stage II diastolic dysfunction), HTN, HLD, BPH, bladder CA s/p resection, CVA without residual deficits, severe presbycusis who presented with complaint of worsening SOB x several months.    Of note, most recent TTE (1/2017) showed EF of 43%, mild-moderate segmental LV systolic dysfunction with akinesis of the basal-mid inferolateral wall, the basal anteroseptum, basal-mid inferoseptum, and inferior wall. Also noted moderate diastolic dysfunction (Stage II), RV enlargement with normal RV systolic function.    In the ED, VS on presentation: T 98.4, HR 93, /96, RR 18, SpO2 96% on RA  CBC revealed WBC of 4.2, Hb 11.8 (baseline around 12), platelets 215. CMP unremarkable. Sherrie negative. Pro-BnP elevated at 6506 (previously around 2976 in November 2017). VBG pH 7.36, lactate 1.8. CXR showed pulmonary edema. He was given Lasix 40 IVP x 1 in the ED and admitted to medicine on telemetry for further management. Patient is a 89 yo M w/PMH of HFpEF (stage II diastolic dysfunction), HTN, HLD, BPH, bladder CA s/p resection, CVA without residual deficits, severe presbycusis who presented with complaint of worsening SOB x 3 weeks. The patient reports that over the past 3 weeks he has had progressively worsening shortness of breath associated with worsening of his bilateral LE edema. He reports also having a non-productive cough for the same period of time. Denies chest pain, fevers, chills, nausea, vomiting, diarrhea. Reports he tried an ipratropium nebulizer "about 6 times" with mild improvement of his symptoms. Also reports worsening SOB with lying flat, denies exertional symptoms.    He does not know the medications he takes, however per review of [employer] chart, he should be taking: ASA 81 QD, Lipitor 10 QD, Finasteride 5 QD, Losartan 50 QD. Noted last prescribed Lasix 40 daily on January 11 for a 15 day course.     Of note, most recent TTE (1/2017) showed EF of 43%, mild-moderate segmental LV systolic dysfunction with akinesis of the basal-mid inferolateral wall, the basal anteroseptum, basal-mid inferoseptum, and inferior wall. Also noted moderate diastolic dysfunction (Stage II), RV enlargement with normal RV systolic function.    In the ED, VS on presentation: T 98.4, HR 93, /96, RR 18, SpO2 96% on RA  CBC revealed WBC of 4.2, Hb 11.8 (baseline around 12), platelets 215. CMP unremarkable. Sherrie negative. Pro-BnP elevated at 6506 (previously around 2976 in November 2017). VBG pH 7.36, lactate 1.8. CXR showed pulmonary edema. He was given Lasix 40 IVP x 1 in the ED and admitted to medicine on telemetry for further management.

## 2018-02-15 NOTE — H&P ADULT - PROBLEM SELECTOR PLAN 2
Per med rec on previous admission and in [employer], patient is taking Losartan 50 at home  - will continue home med with hold parameters, will need clarification in the AM with family or pharmacy

## 2018-02-15 NOTE — H&P ADULT - NSHPPHYSICALEXAM_GEN_ALL_CORE
Personally reviewed labs.   Personally reviewed imaging. CXR showed pulmonary edema.  Personally reviewed EKG.                           11.8   4.2   )-----------( 215      ( 14 Feb 2018 19:47 )             36.8       02-14    138  |  104  |  22  ----------------------------<  85  4.1   |  18<L>  |  0.94    Ca    9.0      14 Feb 2018 19:47    TPro  7.3  /  Alb  3.4  /  TBili  0.7  /  DBili  x   /  AST  29  /  ALT  21  /  AlkPhos  92  02-14      CARDIAC MARKERS ( 14 Feb 2018 19:47 )  x     / <0.01 ng/mL / 120 U/L / x     / 4.0 ng/mL    LIVER FUNCTIONS - ( 14 Feb 2018 19:47 )  Alb: 3.4 g/dL / Pro: 7.3 g/dL / ALK PHOS: 92 U/L / ALT: 21 U/L RC / AST: 29 U/L / GGT: x           PT/INR - ( 14 Feb 2018 19:47 )   PT: 13.1 sec;   INR: 1.20 ratio    PTT - ( 14 Feb 2018 19:47 )  PTT:30.0 sec PHYSICAL EXAM:  Vital Signs Last 24 Hrs  T(F): 98 (15 Feb 2018 04:12), Max: 98.4 (14 Feb 2018 19:13)  HR: 77 (15 Feb 2018 04:12) (77 - 93)  BP: 102/62 (15 Feb 2018 04:12) (101/63 - 134/96)  RR: 18 (15 Feb 2018 04:12) (18 - 18)  SpO2: 92% (15 Feb 2018 04:12) (92% - 98%)  GENERAL: NAD, well-developed  HEAD: Atraumatic, Normocephalic  EYES: EOMI, PERRLA, conjunctiva and sclera clear  NECK: Supple, No JVD  CHEST/LUNG: bibasilar rales, no wheezes  HEART: Regular rate and rhythm  ABDOMEN: Soft, Nontender, Nondistended; Bowel sounds present  EXTREMITIES: 2+ bilateral LE pitting edema  PSYCH: reactive affect  NEUROLOGY: AAOx3, non-focal, very hard of hearing  SKIN: No rashes or lesions PHYSICAL EXAM:  Vital Signs Last 24 Hrs  T(F): 98 (15 Feb 2018 04:12), Max: 98.4 (14 Feb 2018 19:13)  HR: 77 (15 Feb 2018 04:12) (77 - 93)  BP: 102/62 (15 Feb 2018 04:12) (101/63 - 134/96)  RR: 18 (15 Feb 2018 04:12) (18 - 18)  SpO2: 92% (15 Feb 2018 04:12) (92% - 98%)  GENERAL: NAD, well-developed  HEAD: Atraumatic, Normocephalic  EYES: EOMI, PERRLA, conjunctiva and sclera clear  NECK: Supple, No JVD, no lad  CHEST/LUNG: bibasilar rales, no wheezes  HEART: Regular rate and rhythm  ABDOMEN: Soft, Nontender, Nondistended; Bowel sounds present  EXTREMITIES: 2+ bilateral LE pitting edema  PSYCH: reactive affect, calm, no tangential speech.  NEUROLOGY: AAOx3, non-focal, very hard of hearing  SKIN: No rashes or lesions

## 2018-02-15 NOTE — PROGRESS NOTE ADULT - SUBJECTIVE AND OBJECTIVE BOX
Patient is a 89y old  Male who presents with a chief complaint of shortness of breath (15 Feb 2018 04:08)        SUBJECTIVE / OVERNIGHT EVENTS:      MEDICATIONS  (STANDING):  aspirin enteric coated 81 milliGRAM(s) Oral daily  atorvastatin 10 milliGRAM(s) Oral at bedtime  finasteride 5 milliGRAM(s) Oral daily  furosemide   Injectable 40 milliGRAM(s) IV Push two times a day  heparin  Injectable 5000 Unit(s) SubCutaneous every 8 hours  losartan 50 milliGRAM(s) Oral daily  multivitamin 1 Tablet(s) Oral daily    MEDICATIONS  (PRN):      Vital Signs Last 24 Hrs  T(C): 36.7 (15 Feb 2018 04:12), Max: 36.9 (14 Feb 2018 19:13)  T(F): 98 (15 Feb 2018 04:12), Max: 98.4 (14 Feb 2018 19:13)  HR: 77 (15 Feb 2018 04:12) (77 - 93)  BP: 102/62 (15 Feb 2018 04:12) (101/63 - 134/96)  BP(mean): --  RR: 18 (15 Feb 2018 04:12) (18 - 18)  SpO2: 92% (15 Feb 2018 04:12) (92% - 98%)  CAPILLARY BLOOD GLUCOSE        I&O's Summary    14 Feb 2018 07:01  -  15 Feb 2018 07:00  --------------------------------------------------------  IN: 0 mL / OUT: 900 mL / NET: -900 mL    15 Feb 2018 07:01  -  15 Feb 2018 12:01  --------------------------------------------------------  IN: 240 mL / OUT: 800 mL / NET: -560 mL        PHYSICAL EXAM:  GENERAL: NAD  HEAD:  Atraumatic, Normocephalic  EYES: conjunctiva and sclera clear  NECK: No JVD  CHEST/LUNG: CTA b/l  HEART: S1 S2 RRR  ABDOMEN: +BS Soft, NT/ND  EXTREMITIES:  2+ DP Pulses, No c/c/e  NEUROLOGY: AAOx3, no focal deficits   SKIN: No rashes or lesions    LABS:                        11.8   4.2   )-----------( 215      ( 14 Feb 2018 19:47 )             36.8     02-14    138  |  104  |  22  ----------------------------<  85  4.1   |  18<L>  |  0.94    Ca    9.0      14 Feb 2018 19:47    TPro  7.3  /  Alb  3.4  /  TBili  0.7  /  DBili  x   /  AST  29  /  ALT  21  /  AlkPhos  92  02-14    PT/INR - ( 14 Feb 2018 19:47 )   PT: 13.1 sec;   INR: 1.20 ratio         PTT - ( 14 Feb 2018 19:47 )  PTT:30.0 sec  CARDIAC MARKERS ( 14 Feb 2018 19:47 )  x     / <0.01 ng/mL / 120 U/L / x     / 4.0 ng/mL          RADIOLOGY & ADDITIONAL TESTS:    Imaging Personally Reviewed:  Consultant(s) Notes Reviewed:    Care Discussed with Consultants/Other Providers: Patient is a 89y old  Male who presents with a chief complaint of shortness of breath (15 Feb 2018 04:08)        SUBJECTIVE / OVERNIGHT EVENTS:       MEDICATIONS  (STANDING):  aspirin enteric coated 81 milliGRAM(s) Oral daily  atorvastatin 10 milliGRAM(s) Oral at bedtime  finasteride 5 milliGRAM(s) Oral daily  furosemide   Injectable 40 milliGRAM(s) IV Push two times a day  heparin  Injectable 5000 Unit(s) SubCutaneous every 8 hours  losartan 50 milliGRAM(s) Oral daily  multivitamin 1 Tablet(s) Oral daily    MEDICATIONS  (PRN):      Vital Signs Last 24 Hrs  T(C): 36.7 (15 Feb 2018 04:12), Max: 36.9 (14 Feb 2018 19:13)  T(F): 98 (15 Feb 2018 04:12), Max: 98.4 (14 Feb 2018 19:13)  HR: 77 (15 Feb 2018 04:12) (77 - 93)  BP: 102/62 (15 Feb 2018 04:12) (101/63 - 134/96)  BP(mean): --  RR: 18 (15 Feb 2018 04:12) (18 - 18)  SpO2: 92% (15 Feb 2018 04:12) (92% - 98%)  CAPILLARY BLOOD GLUCOSE        I&O's Summary    14 Feb 2018 07:01  -  15 Feb 2018 07:00  --------------------------------------------------------  IN: 0 mL / OUT: 900 mL / NET: -900 mL    15 Feb 2018 07:01  -  15 Feb 2018 12:01  --------------------------------------------------------  IN: 240 mL / OUT: 800 mL / NET: -560 mL        PHYSICAL EXAM:  GENERAL: NAD  HEAD:  Atraumatic, Normocephalic  EYES: conjunctiva and sclera clear  NECK: No JVD  CHEST/LUNG: CTA b/l  HEART: S1 S2 RRR  ABDOMEN: +BS Soft, NT/ND  EXTREMITIES:  2+ DP Pulses, No c/c/e  NEUROLOGY: AAOx3, no focal deficits   SKIN: No rashes or lesions    LABS:                        11.8   4.2   )-----------( 215      ( 14 Feb 2018 19:47 )             36.8     02-14    138  |  104  |  22  ----------------------------<  85  4.1   |  18<L>  |  0.94    Ca    9.0      14 Feb 2018 19:47    TPro  7.3  /  Alb  3.4  /  TBili  0.7  /  DBili  x   /  AST  29  /  ALT  21  /  AlkPhos  92  02-14    PT/INR - ( 14 Feb 2018 19:47 )   PT: 13.1 sec;   INR: 1.20 ratio         PTT - ( 14 Feb 2018 19:47 )  PTT:30.0 sec  CARDIAC MARKERS ( 14 Feb 2018 19:47 )  x     / <0.01 ng/mL / 120 U/L / x     / 4.0 ng/mL          RADIOLOGY & ADDITIONAL TESTS:    Imaging Personally Reviewed: CXR film reviewed -   Consultant(s) Notes Reviewed:    Care Discussed with Consultants/Other Providers: Patient is a 89y old  Male who presents with a chief complaint of shortness of breath (15 Feb 2018 04:08)        SUBJECTIVE / OVERNIGHT EVENTS: feeling much better      MEDICATIONS  (STANDING):  aspirin enteric coated 81 milliGRAM(s) Oral daily  atorvastatin 10 milliGRAM(s) Oral at bedtime  finasteride 5 milliGRAM(s) Oral daily  furosemide   Injectable 40 milliGRAM(s) IV Push two times a day  heparin  Injectable 5000 Unit(s) SubCutaneous every 8 hours  losartan 50 milliGRAM(s) Oral daily  multivitamin 1 Tablet(s) Oral daily    MEDICATIONS  (PRN):      Vital Signs Last 24 Hrs  T(C): 36.7 (15 Feb 2018 04:12), Max: 36.9 (14 Feb 2018 19:13)  T(F): 98 (15 Feb 2018 04:12), Max: 98.4 (14 Feb 2018 19:13)  HR: 77 (15 Feb 2018 04:12) (77 - 93)  BP: 102/62 (15 Feb 2018 04:12) (101/63 - 134/96)  BP(mean): --  RR: 18 (15 Feb 2018 04:12) (18 - 18)  SpO2: 92% (15 Feb 2018 04:12) (92% - 98%)  CAPILLARY BLOOD GLUCOSE        I&O's Summary    14 Feb 2018 07:01  -  15 Feb 2018 07:00  --------------------------------------------------------  IN: 0 mL / OUT: 900 mL / NET: -900 mL    15 Feb 2018 07:01  -  15 Feb 2018 12:01  --------------------------------------------------------  IN: 240 mL / OUT: 800 mL / NET: -560 mL        PHYSICAL EXAM:  GENERAL: NAD  HEAD:  Atraumatic, Normocephalic  EYES: conjunctiva and sclera clear  NECK: +JVD  CHEST/LUNG: bibasilar crackles  HEART: S1 S2 RRR  ABDOMEN: +BS Soft, NT/ND  EXTREMITIES:  2+ DP Pulses, No c/c. 2+ b/l LE edema  NEUROLOGY: AAOx3, no focal deficits   SKIN: No rashes or lesions    LABS:                        11.8   4.2   )-----------( 215      ( 14 Feb 2018 19:47 )             36.8     02-14    138  |  104  |  22  ----------------------------<  85  4.1   |  18<L>  |  0.94    Ca    9.0      14 Feb 2018 19:47    TPro  7.3  /  Alb  3.4  /  TBili  0.7  /  DBili  x   /  AST  29  /  ALT  21  /  AlkPhos  92  02-14    PT/INR - ( 14 Feb 2018 19:47 )   PT: 13.1 sec;   INR: 1.20 ratio         PTT - ( 14 Feb 2018 19:47 )  PTT:30.0 sec  CARDIAC MARKERS ( 14 Feb 2018 19:47 )  x     / <0.01 ng/mL / 120 U/L / x     / 4.0 ng/mL          RADIOLOGY & ADDITIONAL TESTS:    Imaging Personally Reviewed: CXR film reviewed - b/l infiltrates  EKG tracing reviewed - Sinus @94bpm, bifascicular block, LVH with repolarization abn - unchanged from baseline  Consultant(s) Notes Reviewed:     Care Discussed with Consultants/Other Providers:

## 2018-02-15 NOTE — H&P ADULT - PROBLEM SELECTOR PLAN 1
Patient presented with worsening shortness of breath in the setting of known heart failure  - found to have elevated pro-BnP, pulmonary edema on CXR and appears volume-overloaded on exam  - s/p 40 IVP Lasix x 1 in the ED  - admit to telemetry  - monitor strict Is and Os and daily weights  - will continue with aggressive diuresis with Lasix 40 IVP BID and closely monitor BMP  - patient reports symptoms already improving Patient presented with worsening shortness of breath in the setting of known heart failure  - found to have elevated pro-BnP, pulmonary edema on CXR and appears volume-overloaded on exam  - s/p 40 IVP Lasix x 1 in the ED  - admit to telemetry  - monitor strict Is and Os and daily weights  - will continue with aggressive diuresis with Lasix 40 IVP BID and closely monitor BMP  - patient reports symptoms already improving  - check TTE

## 2018-02-15 NOTE — H&P ADULT - PROBLEM SELECTOR PLAN 6
Patient does not know his medications, reports his son has a list - will need clarification with son or pharmacy in the AM

## 2018-02-15 NOTE — H&P ADULT - ASSESSMENT
89 yo M w/PMH of HFpEF (stage II diastolic dysfunction), HTN, HLD, BPH, bladder CA s/p resection, CVA without residual deficits, severe presbycusis who presented with complaint of worsening SOB x several months admitted with acute on chronic heart failure exacerbation

## 2018-02-15 NOTE — H&P ADULT - NSHPSOCIALHISTORY_GEN_ALL_CORE
He is a never smoker, denies EtOH or recreational drug use. He lives with his wife and has HHA for 7 days of the week.

## 2018-02-15 NOTE — CONSULT NOTE ADULT - SUBJECTIVE AND OBJECTIVE BOX
HPI:  Patient is a 89 yo M w/PMH of systolic and diastolic heart failure, HTN, HLD, BPH, bladder CA s/p resection, CVA without residual deficits, severe presbycusis , pulmonary fibrosis ? who presented with complaint of worsening SOB x 3 weeks. The patient reports that over the past 3 weeks he has had progressively worsening shortness of breath He reports also having a non-productive cough for the same period of time. Denies chest pain, fevers, chills, nausea, vomiting, diarrhea. Reports he tried an ipratropium nebulizer "about 6 times" with mild improvement of his symptoms. Also reports worsening SOB with lying flat, denies exertional symptoms.    He does not know the medications he takes, however per review of [facility] chart, he should be taking: ASA 81 QD, Lipitor 10 QD, Finasteride 5 QD, Losartan 50 QD. Noted last prescribed Lasix 40 daily on January 11 for a 15 day course.     Of note, most recent TTE (1/2017) showed EF of 43%, mild-moderate segmental LV systolic dysfunction with akinesis of the basal-mid inferolateral wall, the basal anteroseptum, basal-mid inferoseptum, and inferior wall. Also noted moderate diastolic dysfunction (Stage II), RV enlargement with normal RV systolic function.    In the ED, VS on presentation: T 98.4, HR 93, /96, RR 18, SpO2 96% on RA  CBC revealed WBC of 4.2, Hb 11.8 (baseline around 12), platelets 215. CMP unremarkable. Sherrie negative. Pro-BnP elevated at 6506 (previously around 2976 in November 2017). VBG pH 7.36, lactate 1.8. CXR showed pulmonary edema. He was given Lasix 40 IVP x 1 in the ED and admitted to medicine on telemetry for further management. (15 Feb 2018 04:08)      PMH:   [place] (hard of hearing)  Hepatitis  CVA (cerebral vascular accident)  Neoplasm of bladder  BPH (benign prostatic hyperplasia)  HTN (hypertension)  Hyperlipidemia  CVA (cerebral infarction)  ASHD (arteriosclerotic heart disease)      PSH:   S/P cataract surgery, right  S/P TURP (status post transurethral resection of prostate)  S/P tonsillectomy      Medications:   aspirin enteric coated 81 milliGRAM(s) Oral daily  atorvastatin 10 milliGRAM(s) Oral at bedtime  finasteride 5 milliGRAM(s) Oral daily  furosemide   Injectable 40 milliGRAM(s) IV Push two times a day  heparin  Injectable 5000 Unit(s) SubCutaneous every 8 hours  losartan 50 milliGRAM(s) Oral daily  multivitamin 1 Tablet(s) Oral daily  potassium chloride    Tablet ER 10 milliEquivalent(s) Oral once      Allergies:  penicillin (Anaphylaxis)      FAMILY HISTORY:  No pertinent family history in first degree relatives      Social History:  Smoking History:  Alcohol Use:  Drug Use:    Review of Systems:  REVIEW OF SYSTEMS:    CONSTITUTIONAL: No weakness, fevers or chills  EYES/ENT: No visual changes;  No dysphagia  NECK: No pain or stiffness  RESPIRATORY: No cough, wheezing, hemoptysis; No shortness of breath  CARDIOVASCULAR: No chest pain or palpitations; No lower extremity edema  GASTROINTESTINAL: No abdominal or epigastric pain. No nausea, vomiting, or hematemesis; No diarrhea or constipation. No melena or hematochezia.  BACK: No back pain  GENITOURINARY: No dysuria, frequency or hematuria  NEUROLOGICAL: No numbness or weakness  SKIN: No itching, burning, rashes, or lesions   All other review of systems is negative unless indicated above.    Physical Exam:  T(F): 98 (02-15), Max: 98.4 (02-14)  HR: 77 (02-15) (77 - 93)  BP: 98/50 (02-15) (98/50 - 134/96)  RR: 18 (02-15)  SpO2: 92% (02-15)    GENERAL: appears thin   HEAD:  Atraumatic, Normocephalic  ENT: EOMI, PERRLA, conjunctiva and sclera clear, Neck supple, No JVD, moist mucosa  CHEST/LUNG: bilateral crackles, and rhonchi,  BACK: No spinal tenderness  HEART: Regular rate and rhythm; No murmurs, rubs, or gallops  ABDOMEN: Soft, Nontender, Nondistended; Bowel sounds present  EXTREMITIES:  No clubbing, cyanosis, or edema  PSYCH: Nl behavior, nl affect  NEUROLOGY: AAOx3, non-focal, cranial nerves intact  SKIN: Normal color, No rashes or lesions  LINES:    Cardiovascular Diagnostic Testing:    Echo:    < from: Transthoracic Echocardiogram (01.14.17 @ 09:40) >  Conclusions:  1. Moderately dilated left atrium.  LA volume index = 46  cc/m2.  2. Mild-Moderate segmental left ventricular systolic  dysfunction. The basal-mid inferolateral wall, the basal  anteroseptum, basal -mid inferoseptum , and  inferior wall  are hypokinetic.  3. Moderate diastolic dysfunction (Stage II).  4. Right ventricular enlargement with normal right  ventricular systolic function.  5. Estimated pulmonary artery systolic pressure equals 53  mm Hg,assuming right atrial pressure equals 8 mm Hg,  consistent with moderate pulmonary pressures.    < end of copied text >    Imaging:    < from: CT Chest No Cont (11.23.17 @ 08:42) >  LUNGS AND LARGE AIRWAYS: No central endobronchial lesion. There are   diffuse ground glass opacities, interlobular septal wall thickening, and   subpleural reticular opacities with associated traction bronchiectasis,   consistent with pulmonary fibrosis. There are denser airspace opacities   in the bilateral upper and lower lobes, consistent with superimposed   pulmonary edema.  PLEURA: Small bilateral pleural effusions.  VESSELS:Atherosclerotic calcifications.  HEART: Heart size is enlarged. Aortic valve and coronary artery   calcifications. No pericardial effusion.  MEDIASTINUM AND JUAN FRANCISCO: Prominent mediastinal lymph nodes. A reference   prevascular lymph node measures 2.8 x 1.8 cm (series 2, image 44) and a   right lower paratracheal lymph node measures 1.9 x 2.3 cm (series 2,   image 46). These are likely reactive in etiology.  CHEST WALL AND LOWER NECK: Within normal limits.  VISUALIZED UPPER ABDOMEN: Right hepatic lobe cyst. Cholelithiasis with a   fluid-filled gallbladder. Bilateral renal cysts.  BONES: Degenerative changes.    IMPRESSION:     Pulmonary fibrosis with superimposed pulmonary edema.    Small bilateral pleural effusions.      < end of copied text >      Labs: Personally reviewed                        11.8   4.2   )-----------( 215      ( 14 Feb 2018 19:47 )             36.8     02-15    141  |  102  |  23  ----------------------------<  119<H>  3.8   |  28  |  1.06    Ca    9.1      15 Feb 2018 14:36    TPro  7.3  /  Alb  3.4  /  TBili  0.7  /  DBili  x   /  AST  29  /  ALT  21  /  AlkPhos  92  02-14    PT/INR - ( 14 Feb 2018 19:47 )   PT: 13.1 sec;   INR: 1.20 ratio         PTT - ( 14 Feb 2018 19:47 )  PTT:30.0 sec  CARDIAC MARKERS ( 15 Feb 2018 14:36 )  x     / 0.01 ng/mL / 106 U/L / x     / 3.2 ng/mL  CARDIAC MARKERS ( 14 Feb 2018 19:47 )  x     / <0.01 ng/mL / 120 U/L / x     / 4.0 ng/mL      Serum Pro-Brain Natriuretic Peptide: 6506 pg/mL (02-14 @ 19:47)

## 2018-02-15 NOTE — CONSULT NOTE ADULT - ATTENDING COMMENTS
89 year old man with multiple medical problems. Short of breath due to combination of factors, but likely to benefit from diuresis of volume overload congestive heart failure due to combined systolic and diastolic dysfunction.

## 2018-02-15 NOTE — H&P ADULT - PMH
ASHD (arteriosclerotic heart disease)    BPH (benign prostatic hyperplasia)    CVA (cerebral infarction)    Hepatitis  per son, contracted hepatitis from tattoo he received in his teens - unsure which type  [place] (hard of hearing)  right hearing aid  HTN (hypertension)    Hyperlipidemia    Neoplasm of bladder

## 2018-02-15 NOTE — PROGRESS NOTE ADULT - PROBLEM SELECTOR PLAN 1
both systolic and diastolic dysfunction on prior TTE.   c/w lasix 40mg iv bid   monitor on telemetry  - monitor strict Is and Os and daily weights  - check TTE both systolic and diastolic dysfunction on prior TTE.   c/w lasix 40mg iv bid   monitor on telemetry  - monitor strict Is and Os and daily weights  cardiology consult called  - check TTE both systolic and diastolic dysfunction on prior TTE.   c/w lasix 40mg iv bid   monitor on telemetry  - monitor strict Is and Os and daily weights  cardiology consult called  - check TTE  moderate pulmonary pressures on previous TTE and previous ct chest with pulm fibrosis - will get Pulmonary consult in am

## 2018-02-15 NOTE — H&P ADULT - NSHPREVIEWOFSYSTEMS_GEN_ALL_CORE
Constitutional: denies fevers, chills, night sweats, weight loss  HEENT: denies visual changes, hearing changes, rhinitis, odynophagia, or dysphagia  Cardiovascular: denies palpitations, chest pain, edema  Respiratory: denies SOB, wheezing  Gastrointestinal: denies N/V/D, abdominal pain, hematochezia, melena  : denies dysuria, hematuria  MSK: denies weakness, joint pain  Neuro: no numbness or tingling  Psych: no depression or anxiety  Skin: denies new rashes or masses Constitutional: denies fevers, chills  HEENT: denies visual changes, rhinitis, or dysphagia  Cardiovascular: denies palpitations, chest pain, + worsening LE edema  Respiratory: denies wheezing +SOB  Gastrointestinal: denies N/V/D, abdominal pain  : denies dysuria, hematuria  MSK: denies weakness, joint pain  Neuro: no numbness or tingling  Psych: no depression or anxiety Constitutional: denies fevers, chills  HEENT: denies visual changes, rhinitis, or dysphagia  Cardiovascular: denies palpitations, chest pain, + worsening LE edema  Respiratory: denies wheezing +SOB  Gastrointestinal: denies N/V/D, abdominal pain  : denies dysuria, hematuria  MSK: denies weakness, joint pain  Neuro: no numbness or tingling  Psych: no depression or anxiety  Allergic: no rash, no hives

## 2018-02-15 NOTE — PATIENT PROFILE ADULT. - INFORMATION COULD NOT BE OBTAINED DETAILS
pt is [place], hearing aids left home. No family members at bedside. Pt is falling asleep during interview.

## 2018-02-15 NOTE — H&P ADULT - NSHPLABSRESULTS_GEN_ALL_CORE
Personally reviewed labs.   Personally reviewed imaging. CXR showed pulmonary edema.                          11.8   4.2   )-----------( 215      ( 14 Feb 2018 19:47 )             36.8       02-14    138  |  104  |  22  ----------------------------<  85  4.1   |  18<L>  |  0.94    Ca    9.0      14 Feb 2018 19:47    TPro  7.3  /  Alb  3.4  /  TBili  0.7  /  DBili  x   /  AST  29  /  ALT  21  /  AlkPhos  92  02-14    CARDIAC MARKERS ( 14 Feb 2018 19:47 )  x     / <0.01 ng/mL / 120 U/L / x     / 4.0 ng/mL    LIVER FUNCTIONS - ( 14 Feb 2018 19:47 )  Alb: 3.4 g/dL / Pro: 7.3 g/dL / ALK PHOS: 92 U/L / ALT: 21 U/L RC / AST: 29 U/L / GGT: x           PT/INR - ( 14 Feb 2018 19:47 )   PT: 13.1 sec;   INR: 1.20 ratio    PTT - ( 14 Feb 2018 19:47 )  PTT:30.0 sec

## 2018-02-15 NOTE — PROGRESS NOTE ADULT - ATTENDING COMMENTS
medications clarified  Dr. M. Luke  [facility]  [phone] medications clarified with outpatient pharmacy -[phone]  Dr. M. Luke  [facility]  [phone]

## 2018-02-16 ENCOUNTER — APPOINTMENT (OUTPATIENT)
Dept: CARDIOTHORACIC SURGERY | Facility: CLINIC | Age: 83
End: 2018-02-16

## 2018-02-16 DIAGNOSIS — Z71.89 OTHER SPECIFIED COUNSELING: ICD-10-CM

## 2018-02-16 LAB
ANION GAP SERPL CALC-SCNC: 12 MMOL/L — SIGNIFICANT CHANGE UP (ref 5–17)
BUN SERPL-MCNC: 22 MG/DL — SIGNIFICANT CHANGE UP (ref 7–23)
CALCIUM SERPL-MCNC: 8.8 MG/DL — SIGNIFICANT CHANGE UP (ref 8.4–10.5)
CHLORIDE SERPL-SCNC: 101 MMOL/L — SIGNIFICANT CHANGE UP (ref 96–108)
CO2 SERPL-SCNC: 27 MMOL/L — SIGNIFICANT CHANGE UP (ref 22–31)
CREAT SERPL-MCNC: 1.11 MG/DL — SIGNIFICANT CHANGE UP (ref 0.5–1.3)
GLUCOSE SERPL-MCNC: 84 MG/DL — SIGNIFICANT CHANGE UP (ref 70–99)
HCT VFR BLD CALC: 36.7 % — LOW (ref 39–50)
HGB BLD-MCNC: 11.7 G/DL — LOW (ref 13–17)
MAGNESIUM SERPL-MCNC: 1.7 MG/DL — SIGNIFICANT CHANGE UP (ref 1.6–2.6)
MCHC RBC-ENTMCNC: 31 PG — SIGNIFICANT CHANGE UP (ref 27–34)
MCHC RBC-ENTMCNC: 31.9 GM/DL — LOW (ref 32–36)
MCV RBC AUTO: 97.2 FL — SIGNIFICANT CHANGE UP (ref 80–100)
PLATELET # BLD AUTO: 189 K/UL — SIGNIFICANT CHANGE UP (ref 150–400)
POTASSIUM SERPL-MCNC: 3.6 MMOL/L — SIGNIFICANT CHANGE UP (ref 3.5–5.3)
POTASSIUM SERPL-SCNC: 3.6 MMOL/L — SIGNIFICANT CHANGE UP (ref 3.5–5.3)
RBC # BLD: 3.77 M/UL — LOW (ref 4.2–5.8)
RBC # FLD: 15.1 % — HIGH (ref 10.3–14.5)
SODIUM SERPL-SCNC: 140 MMOL/L — SIGNIFICANT CHANGE UP (ref 135–145)
WBC # BLD: 3.8 K/UL — SIGNIFICANT CHANGE UP (ref 3.8–10.5)
WBC # FLD AUTO: 3.8 K/UL — SIGNIFICANT CHANGE UP (ref 3.8–10.5)

## 2018-02-16 PROCEDURE — 99233 SBSQ HOSP IP/OBS HIGH 50: CPT | Mod: GC

## 2018-02-16 PROCEDURE — 99223 1ST HOSP IP/OBS HIGH 75: CPT

## 2018-02-16 PROCEDURE — 99497 ADVNCD CARE PLAN 30 MIN: CPT

## 2018-02-16 PROCEDURE — 99233 SBSQ HOSP IP/OBS HIGH 50: CPT

## 2018-02-16 RX ORDER — POTASSIUM CHLORIDE 20 MEQ
20 PACKET (EA) ORAL ONCE
Qty: 0 | Refills: 0 | Status: COMPLETED | OUTPATIENT
Start: 2018-02-16 | End: 2018-02-16

## 2018-02-16 RX ADMIN — FINASTERIDE 5 MILLIGRAM(S): 5 TABLET, FILM COATED ORAL at 13:32

## 2018-02-16 RX ADMIN — Medication 1 TABLET(S): at 13:34

## 2018-02-16 RX ADMIN — LOSARTAN POTASSIUM 50 MILLIGRAM(S): 100 TABLET, FILM COATED ORAL at 05:52

## 2018-02-16 RX ADMIN — HEPARIN SODIUM 5000 UNIT(S): 5000 INJECTION INTRAVENOUS; SUBCUTANEOUS at 05:52

## 2018-02-16 RX ADMIN — Medication 81 MILLIGRAM(S): at 13:32

## 2018-02-16 RX ADMIN — Medication 40 MILLIGRAM(S): at 05:52

## 2018-02-16 RX ADMIN — Medication 40 MILLIGRAM(S): at 17:58

## 2018-02-16 RX ADMIN — HEPARIN SODIUM 5000 UNIT(S): 5000 INJECTION INTRAVENOUS; SUBCUTANEOUS at 22:03

## 2018-02-16 RX ADMIN — ATORVASTATIN CALCIUM 10 MILLIGRAM(S): 80 TABLET, FILM COATED ORAL at 22:03

## 2018-02-16 RX ADMIN — HEPARIN SODIUM 5000 UNIT(S): 5000 INJECTION INTRAVENOUS; SUBCUTANEOUS at 13:34

## 2018-02-16 RX ADMIN — Medication 20 MILLIEQUIVALENT(S): at 13:33

## 2018-02-16 NOTE — CONSULT NOTE ADULT - SUBJECTIVE AND OBJECTIVE BOX
CHIEF COMPLAINT: Shortness of breath     HPI: 89 M with a PMH of HTN, HLD, CVA, systolic an diastolic HF, pulm fibrosis, bladder CA s/p resection, BPH, admitted with progressively worsening shortness of breath and bilateral LE edema for 3 weeks. These symptoms were also accompanied by a non productive cough. He denies any chest pain, fever, chills, N/V/D. His symptoms were not relieved by his home nebulizers. He was noted to have acute decompensated HF (reduction in EF from 43 % in 1/17 to 10 % ) with acute pulm edema with a proBNP of > 6 K.  A TTE during this admission showed sev LV systolic dysfunction with an EF of 10 % and sev PH. He has diuresed 2.6 L over the last 2 days and reports improvement in his SOB.     PAST MEDICAL & SURGICAL HISTORY:  [place] (hard of hearing): right hearing aid  Hepatitis: per son, contracted hepatitis from tattoo he received in his teens - unsure which type  Neoplasm of bladder  BPH (benign prostatic hyperplasia)  HTN (hypertension)  Hyperlipidemia  CVA (cerebral infarction)  ASHD (arteriosclerotic heart disease)  S/P cataract surgery, right  S/P TURP (status post transurethral resection of prostate)  S/P tonsillectomy      FAMILY HISTORY:  No pertinent family history in first degree relatives      SOCIAL HISTORY:  Smoking: [ ] Never Smoked [ ] Former Smoker (__ packs x ___ years) [ ] Current Smoker  (__ packs x ___ years)  Substance Use: [ ] Never Used [ ] Used ____  EtOH Use:  Marital Status: [ ] Single [ ]  [ ]  [ ]   Sexual History:   Occupation:  Recent Travel:  Country of Birth:  Advance Directives:    Allergies    penicillin (Anaphylaxis)    Intolerances        HOME MEDICATIONS:    REVIEW OF SYSTEMS:  Constitutional: [ ] negative [ ] fevers [ ] chills [ ] weight loss [ ] weight gain  HEENT: [ ] negative [ ] dry eyes [ ] eye irritation [ ] postnasal drip [ ] nasal congestion  CV: [ ] negative  [ ] chest pain [ ] orthopnea [ ] palpitations [ ] murmur  Resp: [ ] negative [ ] cough [ ] shortness of breath [ ] dyspnea [ ] wheezing [ ] sputum [ ] hemoptysis  GI: [ ] negative [ ] nausea [ ] vomiting [ ] diarrhea [ ] constipation [ ] abd pain [ ] dysphagia   : [ ] negative [ ] dysuria [ ] nocturia [ ] hematuria [ ] increased urinary frequency  Musculoskeletal: [ ] negative [ ] back pain [ ] myalgias [ ] arthralgias [ ] fracture  Skin: [ ] negative [ ] rash [ ] itch  Neurological: [ ] negative [ ] headache [ ] dizziness [ ] syncope [ ] weakness [ ] numbness  Psychiatric: [ ] negative [ ] anxiety [ ] depression  Endocrine: [ ] negative [ ] diabetes [ ] thyroid problem  Hematologic/Lymphatic: [ ] negative [ ] anemia [ ] bleeding problem  Allergic/Immunologic: [ ] negative [ ] itchy eyes [ ] nasal discharge [ ] hives [ ] angioedema  [ ] All other systems negative  [ ] Unable to assess ROS because ________    OBJECTIVE:  ICU Vital Signs Last 24 Hrs  T(C): 37.1 (16 Feb 2018 12:39), Max: 37.1 (16 Feb 2018 12:39)  T(F): 98.8 (16 Feb 2018 12:39), Max: 98.8 (16 Feb 2018 12:39)  HR: 74 (16 Feb 2018 12:39) (65 - 86)  BP: 108/70 (16 Feb 2018 12:39) (98/50 - 121/79)  BP(mean): --  ABP: --  ABP(mean): --  RR: 18 (16 Feb 2018 12:39) (18 - 18)  SpO2: 95% (16 Feb 2018 12:39) (95% - 98%)        02-15 @ 07:01  -  02-16 @ 07:00  --------------------------------------------------------  IN: 740 mL / OUT: 2450 mL / NET: -1710 mL    02-16 @ 07:01  -  02-16 @ 13:39  --------------------------------------------------------  IN: 0 mL / OUT: 300 mL / NET: -300 mL      CAPILLARY BLOOD GLUCOSE          PHYSICAL EXAM:  General:   HEENT:   Lymph Nodes:  Neck:   Respiratory:   Cardiovascular:   Abdomen:   Extremities:   Skin:   Neurological:  Psychiatry:    HOSPITAL MEDICATIONS:  aspirin enteric coated 81 milliGRAM(s) Oral daily  heparin  Injectable 5000 Unit(s) SubCutaneous every 8 hours      furosemide   Injectable 40 milliGRAM(s) IV Push two times a day  losartan 50 milliGRAM(s) Oral daily    atorvastatin 10 milliGRAM(s) Oral at bedtime  finasteride 5 milliGRAM(s) Oral daily              multivitamin 1 Tablet(s) Oral daily            LABS:                        11.7   3.8   )-----------( 189      ( 16 Feb 2018 06:38 )             36.7     Hgb Trend: 11.7<--, 11.8<--  02-16    140  |  101  |  22  ----------------------------<  84  3.6   |  27  |  1.11    Ca    8.8      16 Feb 2018 06:37  Mg     1.7     02-16    TPro  7.3  /  Alb  3.4  /  TBili  0.7  /  DBili  x   /  AST  29  /  ALT  21  /  AlkPhos  92  02-14    Creatinine Trend: 1.11<--, 1.06<--, 0.94<--  PT/INR - ( 14 Feb 2018 19:47 )   PT: 13.1 sec;   INR: 1.20 ratio         PTT - ( 14 Feb 2018 19:47 )  PTT:30.0 sec      Venous Blood Gas:  02-14 @ 19:47  7.36/42/36/23/62  VBG Lactate: 1.8      MICROBIOLOGY:     RADIOLOGY:  [ ] Reviewed and interpreted by me    PULMONARY FUNCTION TESTS:    EKG: CHIEF COMPLAINT: Shortness of breath     HPI: 89 M with a PMH of HTN, HLD, CVA, systolic and diastolic HF, pulm fibrosis, bladder CA s/p resection, BPH, admitted with progressively worsening shortness of breath and bilateral LE edema for 3 weeks. These symptoms were also accompanied by a non productive cough. He denied any chest pain, fever, chills, N/V/D on admission. His symptoms were not relieved by his home nebulizers. He was noted to have acute decompensated HF (reduction in EF from 43 % in 1/17 to 10 % ) with acute pulm edema with a proBNP of > 6 K.  A TTE during this admission showed sev LV systolic dysfunction with an EF of 10 % and sev PH. He has diuresed 2.6 L over the last 2 days and reports improvement in his SOB. Currently denies any SOB, cough, CP.      PAST MEDICAL & SURGICAL HISTORY:  [place] (hard of hearing): right hearing aid  Hepatitis: per son, contracted hepatitis from tattoo he received in his teens - unsure which type  Neoplasm of bladder  BPH (benign prostatic hyperplasia)  HTN (hypertension)  Hyperlipidemia  CVA (cerebral infarction)  ASHD (arteriosclerotic heart disease)  S/P cataract surgery, right  S/P TURP (status post transurethral resection of prostate)  S/P tonsillectomy      FAMILY HISTORY:  No pertinent family history in first degree relatives      SOCIAL HISTORY:  Smoking: [ ] Never Smoked [x ] Former Smoker (_1_ packs x _35__ years) [ ] Current Smoker  (__ packs x ___ years)  Substance Use: [x ] Never Used [ ] Used ____  EtOH Use: Social  Marital Status: [ ] Single [x ]  [ ]  [ ]   Sexual History: NC  Occupation: Retired, used to work for a [employer] company  Recent Travel: None  Advance Directives: Full code     Allergies    penicillin (Anaphylaxis)    Intolerances        HOME MEDICATIONS:    REVIEW OF SYSTEMS:  Constitutional: [ ] negative [- ] fevers [ -] chills [ ] weight loss [ ] weight gain  HEENT: [ ] negative [- ] dry eyes [- ] eye irritation [ ] postnasal drip [ ] nasal congestion  CV: [ ] negative  [- ] chest pain [- ] orthopnea [ ] palpitations [ ] murmur  Resp: [ ] negative [- ] cough [ -] shortness of breath [ ] dyspnea [- ] wheezing [ -] sputum [- ] hemoptysis  GI: [ ] negative [- ] nausea [- ] vomiting [- ] diarrhea [ -] constipation [ ] abd pain [ ] dysphagia   : [ ] negative [- ] dysuria [- ] nocturia [- ] hematuria [ ] increased urinary frequency  Musculoskeletal: [- ] negative [- ] back pain [- ] myalgias [ ] arthralgias [ ] fracture  Skin: [ ] negative [-] rash [ ] itch  Neurological: [ ] negative [ -] headache [- ] dizziness [ ] syncope [ ] weakness [ ] numbness  Psychiatric: -[ ] negative [ ] anxiety [ ] depression  Endocrine: [- ] negative [ ] diabetes [ ] thyroid problem  Hematologic/Lymphatic: [- ] negative [ ] anemia [ ] bleeding problem  Allergic/Immunologic: [ -] negative [ ] itchy eyes [ ] nasal discharge [ ] hives [ ] angioedema  [x ] All other systems negative  [ ] Unable to assess ROS because ________    OBJECTIVE:  ICU Vital Signs Last 24 Hrs  T(C): 37.1 (16 Feb 2018 12:39), Max: 37.1 (16 Feb 2018 12:39)  T(F): 98.8 (16 Feb 2018 12:39), Max: 98.8 (16 Feb 2018 12:39)  HR: 74 (16 Feb 2018 12:39) (65 - 86)  BP: 108/70 (16 Feb 2018 12:39) (98/50 - 121/79)  BP(mean): --  ABP: --  ABP(mean): --  RR: 18 (16 Feb 2018 12:39) (18 - 18)  SpO2: 95% (16 Feb 2018 12:39) (95% - 98%)        02-15 @ 07:01  -  02-16 @ 07:00  --------------------------------------------------------  IN: 740 mL / OUT: 2450 mL / NET: -1710 mL    02-16 @ 07:01  -  02-16 @ 13:39  --------------------------------------------------------  IN: 0 mL / OUT: 300 mL / NET: -300 mL      CAPILLARY BLOOD GLUCOSE          PHYSICAL EXAM:  General: NAD, speaking in full sentences, no accessory mx use   HEENT: PERRLA  Lymph Nodes: No palpable cervical LNs  Neck: Supple, mild JVD  Respiratory: Bibasilar crackles, no wheezing  Cardiovascular: RRR, S1+S2+systolic murmur  Abdomen: Soft, NT, ND, BS+  Extremities: B/l LE pitting edema, pulses + b/l   Skin: No rash, chronic skin changes   Neurological: AAOx3, grossly non focal   Psychiatry: Normal mood     HOSPITAL MEDICATIONS:  aspirin enteric coated 81 milliGRAM(s) Oral daily  heparin  Injectable 5000 Unit(s) SubCutaneous every 8 hours      furosemide   Injectable 40 milliGRAM(s) IV Push two times a day  losartan 50 milliGRAM(s) Oral daily    atorvastatin 10 milliGRAM(s) Oral at bedtime  finasteride 5 milliGRAM(s) Oral daily              multivitamin 1 Tablet(s) Oral daily            LABS:                        11.7   3.8   )-----------( 189      ( 16 Feb 2018 06:38 )             36.7     Hgb Trend: 11.7<--, 11.8<--  02-16    140  |  101  |  22  ----------------------------<  84  3.6   |  27  |  1.11    Ca    8.8      16 Feb 2018 06:37  Mg     1.7     02-16    TPro  7.3  /  Alb  3.4  /  TBili  0.7  /  DBili  x   /  AST  29  /  ALT  21  /  AlkPhos  92  02-14    Creatinine Trend: 1.11<--, 1.06<--, 0.94<--  PT/INR - ( 14 Feb 2018 19:47 )   PT: 13.1 sec;   INR: 1.20 ratio         PTT - ( 14 Feb 2018 19:47 )  PTT:30.0 sec      Venous Blood Gas:  02-14 @ 19:47  7.36/42/36/23/62  VBG Lactate: 1.8      MICROBIOLOGY:   Rapid Respiratory Viral Panel (02.15.18 @ 18:53)    Rapid RVP Result: [facility]: The Film[employer] RVP Rapid uses polymerase chain reaction (PCR) and melt  curve analysis to screen for adenovirus; coronavirus HKU1, NL63, 229E,  OC43; human metapneumovirus (hMPV); human enterovirus/rhinovirus  (Entero/RV); influenza A; influenza A/H1;influenza A/H3; influenza  A/H1-2009; influenza B; parainfluenza viruses 1, 2, 3, 4; respiratory  syncytial virus; Bordetella pertussis; Mycoplasma pneumoniae; and  Chlamydophila pneumoniae.      RADIOLOGY:  < from: Xray Chest 1 View- PORTABLE-Routine (02.14.18 @ 19:50) >  Increased right lower lung opacity may represent superimposed edema on   the chronic lung disease.    < from: CT Chest No Cont (11.23.17 @ 08:42) >  LUNGS AND LARGE AIRWAYS: No central endobronchial lesion. There are   diffuse ground glass opacities, interlobular septal wall thickening, and   subpleural reticular opacities with associated traction bronchiectasis,   consistent with pulmonary fibrosis. There are denser airspace opacities   in the bilateral upper and lower lobes, consistent with superimposed   pulmonary edema.  PLEURA: Small bilateral pleural effusions.  VESSELS:Atherosclerotic calcifications.  HEART: Heart size is enlarged. Aortic valve and coronary artery   calcifications. No pericardial effusion.  MEDIASTINUM AND JUAN FRANCISCO: Prominent mediastinal lymph nodes. A reference   prevascular lymph node measures 2.8 x 1.8 cm (series 2, image 44) and a   right lower paratracheal lymph node measures 1.9 x 2.3 cm (series 2,   image 46). These are likely reactive in etiology.  CHEST WALL AND LOWER NECK: Within normal limits.  VISUALIZED UPPER ABDOMEN: Right hepatic lobe cyst. Cholelithiasis with a   fluid-filled gallbladder. Bilateral renal cysts.  BONES: Degenerative changes.    < from: Transthoracic Echocardiogram (02.15.18 @ 15:46) >  Mitral Valve: Mitral annular calcification with tethered  mitral valve leaflets with normal opening. Moderate mitral  regurgitation.  Aortic Valve/Aorta: Calcified trileaflet aortic valve with  normal opening. Mild aortic regurgitation.  Peak left  ventricular outflow tractgradient equals 3 mm Hg, mean  gradient is equal to 1 mm Hg, LVOT velocity time integral  equals 12 cm.  Mild aortic root dilatation  (Ao: 4.0 cm at the sinuses of  Valsalva).  Left Atrium: Moderately dilated left atrium.  LA volume  index = 48 cc/m2.  Left Ventricle: Severe left ventricular systolic  dysfunction. Normal left ventricular internal dimensions  and wall thicknesses. Severe diastolic dysfunction (Stage  III).  Right Heart: Severe right atrial enlargement. Right  ventricular enlargement with decreased right ventricular  systolic function. Tethered tricuspid valve. Severe  tricuspid regurgitation. Normal pulmonic valve. Moderate  pulmonic regurgitation.  Pericardium/Pleura: No pericardial effusion seen.  Hemodynamic: Estimated right atrial pressure is 15 mm Hg.  Estimated right ventricular systolic pressure equals 67 mm  Hg, assuming right atrial pressure equals 15 mm Hg,  consistent with severe pulmonary hypertension.    [ ] Reviewed and interpreted by me    PULMONARY FUNCTION TESTS:    EKG:

## 2018-02-16 NOTE — PHYSICAL THERAPY INITIAL EVALUATION ADULT - PERTINENT HX OF CURRENT PROBLEM, REHAB EVAL
3 yo M presented with complaint of worsening SOB x 3 weeks associated with worsening of his bilateral LE edema. Pt also report non-productive cough. Most recent TTE (1/2017) showed EF of 43%, mild-moderate segmental LV systolic dysfunction. CXR showed pulmonary edema.

## 2018-02-16 NOTE — PROGRESS NOTE ADULT - PROBLEM SELECTOR PLAN 1
both systolic and diastolic dysfunction on prior TTE.   c/w lasix 40mg iv bid - overall improved   monitor on telemetry  - monitor strict Is and Os and daily weights  cardiology consult appreciated  - TTE with worsening systolic and diastolic function with EF 10% and severe pulm htn  previous ct chest with pulm fibrosis - Pulmonary consult called  patient will need aggressive diuresis prior to repeat CT chest if needed.

## 2018-02-16 NOTE — PROGRESS NOTE ADULT - ATTENDING COMMENTS
awaiting family to come in to have discussion about ACP  Dr. EDIL Valdes  Medicine Hospitalist  [phone] Dr. [facility]IL [facility]  [phone]

## 2018-02-16 NOTE — PROGRESS NOTE ADULT - SUBJECTIVE AND OBJECTIVE BOX
Interval events:   - sleeping almost flat in bed, when seen   - no major overnight events.       MEDS  MEDICATIONS  (STANDING):  aspirin enteric coated 81 milliGRAM(s) Oral daily  atorvastatin 10 milliGRAM(s) Oral at bedtime  finasteride 5 milliGRAM(s) Oral daily  furosemide   Injectable 40 milliGRAM(s) IV Push two times a day  heparin  Injectable 5000 Unit(s) SubCutaneous every 8 hours  losartan 50 milliGRAM(s) Oral daily  multivitamin 1 Tablet(s) Oral daily    Review of Systems:  REVIEW OF SYSTEMS:    CONSTITUTIONAL: No weakness, fevers or chills  EYES/ENT: No visual changes;  No dysphagia  NECK: No pain or stiffness  RESPIRATORY: No cough, wheezing, hemoptysis; No shortness of breath  CARDIOVASCULAR: No chest pain or palpitations; No lower extremity edema  GASTROINTESTINAL: No abdominal or epigastric pain. No nausea, vomiting, or hematemesis; No diarrhea or constipation. No melena or hematochezia.  BACK: No back pain  GENITOURINARY: No dysuria, frequency or hematuria  NEUROLOGICAL: No numbness or weakness  SKIN: No itching, burning, rashes, or lesions   All other review of systems is negative unless indicated above.    Physical Exam:  T(F): 98 (02-15), Max: 98.4 (02-14)  HR: 77 (02-15) (77 - 93)  BP: 98/50 (02-15) (98/50 - 134/96)  RR: 18 (02-15)  SpO2: 92% (02-15)    GENERAL: appears thin   HEAD:  Atraumatic, Normocephalic  ENT: EOMI, PERRLA, conjunctiva and sclera clear, Neck supple, No JVD, moist mucosa  CHEST/LUNG: bilateral crackles, and rhonchi,  BACK: No spinal tenderness  HEART: Regular rate and rhythm; No murmurs, rubs, or gallops  ABDOMEN: Soft, Nontender, Nondistended; Bowel sounds present  EXTREMITIES:  No clubbing, cyanosis, or edema  PSYCH: Nl behavior, nl affect  NEUROLOGY: AAOx3, non-focal, cranial nerves intact  SKIN: Normal color, No rashes or lesions  LINES:        15 Feb 2018 07:01  -  16 Feb 2018 07:00  --------------------------------------------------------  IN:    Oral Fluid: 740 mL  Total IN: 740 mL    OUT:    Voided: 2450 mL  Total OUT: 2450 mL    Total NET: -1710 mL          Cardiovascular Diagnostic Testing:    Echo:    < from: Transthoracic Echocardiogram (01.14.17 @ 09:40) >  Conclusions:  1. Moderately dilated left atrium.  LA volume index = 46  cc/m2.  2. Mild-Moderate segmental left ventricular systolic  dysfunction. The basal-mid inferolateral wall, the basal  anteroseptum, basal -mid inferoseptum , and  inferior wall  are hypokinetic.  3. Moderate diastolic dysfunction (Stage II).  4. Right ventricular enlargement with normal right  ventricular systolic function.  5. Estimated pulmonary artery systolic pressure equals 53  mm Hg,assuming right atrial pressure equals 8 mm Hg,  consistent with moderate pulmonary pressures.    < end of copied text >    Imaging:    < from: CT Chest No Cont (11.23.17 @ 08:42) >  LUNGS AND LARGE AIRWAYS: No central endobronchial lesion. There are   diffuse ground glass opacities, interlobular septal wall thickening, and   subpleural reticular opacities with associated traction bronchiectasis,   consistent with pulmonary fibrosis. There are denser airspace opacities   in the bilateral upper and lower lobes, consistent with superimposed   pulmonary edema.  PLEURA: Small bilateral pleural effusions.  VESSELS:Atherosclerotic calcifications.  HEART: Heart size is enlarged. Aortic valve and coronary artery   calcifications. No pericardial effusion.  MEDIASTINUM AND JUAN FRANCISCO: Prominent mediastinal lymph nodes. A reference   prevascular lymph node measures 2.8 x 1.8 cm (series 2, image 44) and a   right lower paratracheal lymph node measures 1.9 x 2.3 cm (series 2,   image 46). These are likely reactive in etiology.  CHEST WALL AND LOWER NECK: Within normal limits.  VISUALIZED UPPER ABDOMEN: Right hepatic lobe cyst. Cholelithiasis with a   fluid-filled gallbladder. Bilateral renal cysts.  BONES: Degenerative changes.    IMPRESSION:     Pulmonary fibrosis with superimposed pulmonary edema.    Small bilateral pleural effusions.      < end of copied text >      Labs: Personally reviewed                                            11.7   3.8   )-----------( 189      ( 16 Feb 2018 06:38 )             36.7   02-16    140  |  101  |  22  ----------------------------<  84  3.6   |  27  |  1.11    Ca    8.8      16 Feb 2018 06:37  Mg     1.7     02-16    TPro  7.3  /  Alb  3.4  /  TBili  0.7  /  DBili  x   /  AST  29  /  ALT  21  /  AlkPhos  92  02-14 Interval events:   - sleeping almost flat in bed, then out of bed in chair and denies dyspnea.  - no major overnight events.       MEDS  MEDICATIONS  (STANDING):  aspirin enteric coated 81 milliGRAM(s) Oral daily  atorvastatin 10 milliGRAM(s) Oral at bedtime  finasteride 5 milliGRAM(s) Oral daily  furosemide   Injectable 40 milliGRAM(s) IV Push two times a day  heparin  Injectable 5000 Unit(s) SubCutaneous every 8 hours  losartan 50 milliGRAM(s) Oral daily  multivitamin 1 Tablet(s) Oral daily    REVIEW OF SYSTEMS:  CONSTITUTIONAL: No weakness, fevers or chills  EYES/ENT: No visual changes;  No dysphagia  NECK: No pain or stiffness  RESPIRATORY: No cough, wheezing, hemoptysis; No shortness of breath  CARDIOVASCULAR: No chest pain or palpitations; No lower extremity edema  GASTROINTESTINAL: No abdominal or epigastric pain. No nausea, vomiting, or hematemesis; No diarrhea or constipation. No melena or hematochezia.  BACK: No back pain  GENITOURINARY: No dysuria, frequency or hematuria  NEUROLOGICAL: No numbness or weakness  SKIN: No itching, burning, rashes, or lesions   All other review of systems is negative unless indicated above.    Physical Exam:  T(F): 98 (02-15), Max: 98.4 (02-14)  HR: 77 (02-15) (77 - 93)  BP: 98/50 (02-15) (98/50 - 134/96)  RR: 18 (02-15)  SpO2: 92% (02-15)    GENERAL: appears thin   HEAD:  Atraumatic, Normocephalic  ENT: EOMI, PERRLA, conjunctiva and sclera clear, Neck supple, No JVD, moist mucosa  CHEST/LUNG: bilateral crackles, and rhonchi,  BACK: No spinal tenderness  HEART: Regular rate and rhythm; No murmurs, rubs, or gallops  ABDOMEN: Soft, Nontender, Nondistended; Bowel sounds present  EXTREMITIES:  No clubbing, cyanosis, or edema  PSYCH: Nl behavior, nl affect  NEUROLOGY: AAOx3, non-focal, cranial nerves intact  SKIN: Normal color, No rashes or lesions  LINES:        15 Feb 2018 07:01  -  16 Feb 2018 07:00  --------------------------------------------------------  IN:    Oral Fluid: 740 mL  Total IN: 740 mL    OUT:    Voided: 2450 mL  Total OUT: 2450 mL    Total NET: -1710 mL          Cardiovascular Diagnostic Testing:    Echo:    < from: Transthoracic Echocardiogram (01.14.17 @ 09:40) >  Conclusions:  1. Moderately dilated left atrium.  LA volume index = 46  cc/m2.  2. Mild-Moderate segmental left ventricular systolic  dysfunction. The basal-mid inferolateral wall, the basal  anteroseptum, basal -mid inferoseptum , and  inferior wall  are hypokinetic.  3. Moderate diastolic dysfunction (Stage II).  4. Right ventricular enlargement with normal right  ventricular systolic function.  5. Estimated pulmonary artery systolic pressure equals 53  mm Hg,assuming right atrial pressure equals 8 mm Hg,  consistent with moderate pulmonary pressures.    < end of copied text >    Imaging:    < from: CT Chest No Cont (11.23.17 @ 08:42) >  LUNGS AND LARGE AIRWAYS: No central endobronchial lesion. There are   diffuse ground glass opacities, interlobular septal wall thickening, and   subpleural reticular opacities with associated traction bronchiectasis,   consistent with pulmonary fibrosis. There are denser airspace opacities   in the bilateral upper and lower lobes, consistent with superimposed   pulmonary edema.  PLEURA: Small bilateral pleural effusions.  VESSELS:Atherosclerotic calcifications.  HEART: Heart size is enlarged. Aortic valve and coronary artery   calcifications. No pericardial effusion.  MEDIASTINUM AND JUAN FRANCISCO: Prominent mediastinal lymph nodes. A reference   prevascular lymph node measures 2.8 x 1.8 cm (series 2, image 44) and a   right lower paratracheal lymph node measures 1.9 x 2.3 cm (series 2,   image 46). These are likely reactive in etiology.  CHEST WALL AND LOWER NECK: Within normal limits.  VISUALIZED UPPER ABDOMEN: Right hepatic lobe cyst. Cholelithiasis with a   fluid-filled gallbladder. Bilateral renal cysts.  BONES: Degenerative changes.    IMPRESSION:     Pulmonary fibrosis with superimposed pulmonary edema.    Small bilateral pleural effusions.      < end of copied text >      Labs: Personally reviewed                                            11.7   3.8   )-----------( 189      ( 16 Feb 2018 06:38 )             36.7   02-16    140  |  101  |  22  ----------------------------<  84  3.6   |  27  |  1.11    Ca    8.8      16 Feb 2018 06:37  Mg     1.7     02-16    TPro  7.3  /  Alb  3.4  /  TBili  0.7  /  DBili  x   /  AST  29  /  ALT  21  /  AlkPhos  92  02-14

## 2018-02-16 NOTE — PROGRESS NOTE ADULT - PROBLEM SELECTOR PLAN 6
HSQ for DVT ppx  Regular, DASH/TLC diet  PT consult discussion with son - Evan regarding echo results and plan of care. discussed code status. Son does not want to make patient DNR at this time but is willing to discuss with palliative care. Face to face time - 40 minutes

## 2018-02-16 NOTE — PHYSICAL THERAPY INITIAL EVALUATION ADULT - ADDITIONAL COMMENTS
pt report lives in a house w/ 1 flight of stairs/rail.  Pt report wife is handicapped and has a home health aide for her.  Pt report is a community ambulator and is able to drive a car.

## 2018-02-16 NOTE — PROGRESS NOTE ADULT - SUBJECTIVE AND OBJECTIVE BOX
Patient is a 89y old  Male who presents with a chief complaint of shortness of breath (15 Feb 2018 04:08)        SUBJECTIVE / OVERNIGHT EVENTS: "feeling much better". walked around unit without sob      MEDICATIONS  (STANDING):  aspirin enteric coated 81 milliGRAM(s) Oral daily  atorvastatin 10 milliGRAM(s) Oral at bedtime  finasteride 5 milliGRAM(s) Oral daily  furosemide   Injectable 40 milliGRAM(s) IV Push two times a day  heparin  Injectable 5000 Unit(s) SubCutaneous every 8 hours  losartan 50 milliGRAM(s) Oral daily  multivitamin 1 Tablet(s) Oral daily    MEDICATIONS  (PRN):      Vital Signs Last 24 Hrs  T(C): 37.1 (16 Feb 2018 12:39), Max: 37.1 (16 Feb 2018 12:39)  T(F): 98.8 (16 Feb 2018 12:39), Max: 98.8 (16 Feb 2018 12:39)  HR: 74 (16 Feb 2018 12:39) (65 - 86)  BP: 108/70 (16 Feb 2018 12:39) (100/62 - 121/79)  BP(mean): --  RR: 18 (16 Feb 2018 12:39) (18 - 18)  SpO2: 95% (16 Feb 2018 12:39) (95% - 98%)  CAPILLARY BLOOD GLUCOSE        I&O's Summary    15 Feb 2018 07:01  -  16 Feb 2018 07:00  --------------------------------------------------------  IN: 740 mL / OUT: 2450 mL / NET: -1710 mL    16 Feb 2018 07:01  -  16 Feb 2018 15:40  --------------------------------------------------------  IN: 0 mL / OUT: 300 mL / NET: -300 mL        PHYSICAL EXAM:  GENERAL: NAD  HEAD:  Atraumatic, Normocephalic  EYES: conjunctiva and sclera clear  NECK: +JVD  CHEST/LUNG: slight bibasilar crackles - improved from yesturday  HEART: S1 S2 RRR  ABDOMEN: +BS Soft, NT/ND  EXTREMITIES:  2+ DP Pulses, No c/c. 1-2+ b/l LE edema  NEUROLOGY: AAOx3, no focal deficits   SKIN: No rashes or lesions      LABS:                        11.7   3.8   )-----------( 189      ( 16 Feb 2018 06:38 )             36.7     02-16    140  |  101  |  22  ----------------------------<  84  3.6   |  27  |  1.11    Ca    8.8      16 Feb 2018 06:37  Mg     1.7     02-16    TPro  7.3  /  Alb  3.4  /  TBili  0.7  /  DBili  x   /  AST  29  /  ALT  21  /  AlkPhos  92  02-14    PT/INR - ( 14 Feb 2018 19:47 )   PT: 13.1 sec;   INR: 1.20 ratio         PTT - ( 14 Feb 2018 19:47 )  PTT:30.0 sec  CARDIAC MARKERS ( 15 Feb 2018 14:36 )  x     / 0.01 ng/mL / 106 U/L / x     / 3.2 ng/mL  CARDIAC MARKERS ( 14 Feb 2018 19:47 )  x     / <0.01 ng/mL / 120 U/L / x     / 4.0 ng/mL          RADIOLOGY & ADDITIONAL TESTS:    Imaging Personally Reviewed:  Consultant(s) Notes Reviewed: Cards   Care Discussed with Consultants/Other Providers: d/w Dr. Lazo regarding pulmonary fibrosis

## 2018-02-16 NOTE — PROGRESS NOTE ADULT - ATTENDING COMMENTS
89 year old man with multiple medical problems. Short of breath due to combination of factors, but seems to be benefiting from diuresis of volume overload congestive heart failure due to combined systolic and diastolic dysfunction.

## 2018-02-16 NOTE — PHYSICAL THERAPY INITIAL EVALUATION ADULT - MANUAL MUSCLE TESTING RESULTS, REHAB EVAL
Discharge Planning Assessment  [facility]     Patient Name: Eugenio Joe  MRN: [id number]  Today's Date: 1/19/2017    Admit Date: 1/18/2017          Discharge Needs Assessment       01/19/17 1421    Living Environment    Lives With spouse    Living Arrangements house    Provides Primary Care For no one    Primary Care Provided By spouse/significant other    Quality Of Family Relationships supportive    Able to Return to Prior Living Arrangements yes    Discharge Needs Assessment    Concerns To Be Addressed basic needs concerns   May need SNF    Anticipated Changes Related to Illness none    Equipment Currently Used at Home walker, rolling;cane, straight    Equipment Needed After Discharge none    Transportation Available car;family or friend will provide    Discharge Planning Comments Wife Annette Joe [phone]            Discharge Plan       01/19/17 1422    Case Management/Social Work Plan    Plan Unsure if will return home with wife, or will need SNF.    Patient/Family In Agreement With Plan yes    Additional Comments Spoke with patient and wife at bedside.  Patient has fallen x 2 at home recently.  He uses a cane or walker, has some grab bars in the BR, but wife states they will add more.  He has used HH in the past.  If patient needs SNF - [facility] is 1st choice, spoke with Анна and no beds are available at present.  Wife states 2nd choice is [facility].  They will also consider HH if needed, but want to make decision until see how he is doing.  Packet started and in CCP office.  CCP will follow.         Discharge Placement     Facility/Agency Request Status Selected? Address Phone Number Fax Number    [facility] Pending - No Request Sent     [address] [place] [address] [phone]        Becky S [place], RN 1/19/2017 10:51    1/19 @ 10:50  No beds at present per Анна.                           Demographic Summary       01/19/17 1413     Referral Information    Admission Type inpatient    Arrived From admitted as an inpatient;home or self-care    Referral Source admission list    Reason For Consult discharge planning    Record Reviewed history and physical    Primary Care Physician Information    Name Dr. Adeline Onofre            Functional Status       01/19/17 1419    Functional Status Current    Ambulation 3-->assistive equipment and person    Transferring 3-->assistive equipment and person    Toileting 3-->assistive equipment and person    Bathing 2-->assistive person    Dressing 2-->assistive person    Eating 0-->independent    Communication 0-->understands/communicates without difficulty    Change in Functional Status Since Onset of Current Illness/Injury yes    Functional Status Prior    Ambulation 3-->assistive equipment and person    Transferring 3-->assistive equipment and person    Toileting 3-->assistive equipment and person    Bathing 2-->assistive person    Dressing 2-->assistive person    Eating 0-->independent    Communication 0-->understands/communicates without difficulty    IADL    Medications assistive person   Wife assists    Meal Preparation assistive person    Housekeeping assistive person    Laundry assistive person    Shopping assistive person    Oral Care independent    Activity Tolerance    Current Activity Limitations none    Cognitive/Perceptual/Developmental    Current Mental Status/Cognitive Functioning no deficits noted    Recent Changes in Mental Status/Cognitive Functioning no changes            Psychosocial     None            Abuse/Neglect     None            Legal     None            Substance Abuse     None            Patient Forms     None          Becky S. [place], RN     BUE/ BLE grossly 3+/5 throughout

## 2018-02-17 PROCEDURE — 99233 SBSQ HOSP IP/OBS HIGH 50: CPT

## 2018-02-17 RX ADMIN — Medication 40 MILLIGRAM(S): at 17:55

## 2018-02-17 RX ADMIN — HEPARIN SODIUM 5000 UNIT(S): 5000 INJECTION INTRAVENOUS; SUBCUTANEOUS at 06:22

## 2018-02-17 RX ADMIN — Medication 1 TABLET(S): at 13:08

## 2018-02-17 RX ADMIN — ATORVASTATIN CALCIUM 10 MILLIGRAM(S): 80 TABLET, FILM COATED ORAL at 22:04

## 2018-02-17 RX ADMIN — HEPARIN SODIUM 5000 UNIT(S): 5000 INJECTION INTRAVENOUS; SUBCUTANEOUS at 22:03

## 2018-02-17 RX ADMIN — HEPARIN SODIUM 5000 UNIT(S): 5000 INJECTION INTRAVENOUS; SUBCUTANEOUS at 13:08

## 2018-02-17 RX ADMIN — Medication 40 MILLIGRAM(S): at 06:22

## 2018-02-17 RX ADMIN — Medication 81 MILLIGRAM(S): at 13:08

## 2018-02-17 RX ADMIN — LOSARTAN POTASSIUM 50 MILLIGRAM(S): 100 TABLET, FILM COATED ORAL at 06:22

## 2018-02-17 RX ADMIN — FINASTERIDE 5 MILLIGRAM(S): 5 TABLET, FILM COATED ORAL at 22:04

## 2018-02-17 NOTE — PROGRESS NOTE ADULT - SUBJECTIVE AND OBJECTIVE BOX
Patient is a 89y old  Male who presents with a chief complaint of shortness of breath (15 Feb 2018 04:08)      SUBJECTIVE / OVERNIGHT EVENTS: Patient seen and examined at bedside - patient feels well, says he feels better, no sob, no cp    ROS:  All other review of systems negative    Allergies    penicillin (Anaphylaxis)    Intolerances        MEDICATIONS  (STANDING):  aspirin enteric coated 81 milliGRAM(s) Oral daily  atorvastatin 10 milliGRAM(s) Oral at bedtime  finasteride 5 milliGRAM(s) Oral daily  furosemide   Injectable 40 milliGRAM(s) IV Push two times a day  heparin  Injectable 5000 Unit(s) SubCutaneous every 8 hours  losartan 50 milliGRAM(s) Oral daily  multivitamin 1 Tablet(s) Oral daily    MEDICATIONS  (PRN):      Vital Signs Last 24 Hrs  T(C): 36.7 (17 Feb 2018 04:50), Max: 37.1 (16 Feb 2018 12:39)  T(F): 98.1 (17 Feb 2018 04:50), Max: 98.8 (16 Feb 2018 12:39)  HR: 86 (17 Feb 2018 06:19) (74 - 89)  BP: 114/73 (17 Feb 2018 06:19) (99/64 - 114/73)  BP(mean): --  RR: 18 (17 Feb 2018 04:50) (18 - 18)  SpO2: 96% (17 Feb 2018 04:50) (95% - 96%)  CAPILLARY BLOOD GLUCOSE        I&O's Summary    16 Feb 2018 07:01  -  17 Feb 2018 07:00  --------------------------------------------------------  IN: 850 mL / OUT: 1775 mL / NET: -925 mL    17 Feb 2018 07:01  -  17 Feb 2018 12:20  --------------------------------------------------------  IN: 0 mL / OUT: 500 mL / NET: -500 mL        PHYSICAL EXAM:  GENERAL: NAD, well-developed  HEAD:  Atraumatic, Normocephalic  EYES: EOMI, PERRLA, conjunctiva and sclera clear  NECK: Supple, No JVD  CHEST/LUNG: Clear to auscultation bilaterally; No wheeze  HEART: Regular rate and rhythm; No murmurs, rubs, or gallops  ABDOMEN: Soft, Nontender, Nondistended; Bowel sounds present  EXTREMITIES:  2+ Peripheral Pulses, No clubbing, cyanosis, or edema  NEUROLOGY: AAOx3, non-focal  PSYCH: calm  SKIN: No rashes or lesions    LABS:                        11.7   3.8   )-----------( 189      ( 16 Feb 2018 06:38 )             36.7     02-16    140  |  101  |  22  ----------------------------<  84  3.6   |  27  |  1.11    Ca    8.8      16 Feb 2018 06:37  Mg     1.7     02-16        CARDIAC MARKERS ( 15 Feb 2018 14:36 )  x     / 0.01 ng/mL / 106 U/L / x     / 3.2 ng/mL          RADIOLOGY & ADDITIONAL TESTS:    Imaging Personally Reviewed:    Consultant(s) Notes Reviewed:      Care Discussed with Consultants/Other Providers:    Case Discussed with Family:    Goals of Care:

## 2018-02-17 NOTE — PROGRESS NOTE ADULT - PROBLEM SELECTOR PLAN 6
discussion with son yesterday - Evan regarding echo results and plan of care. discussed code status. Son does not want to make patient DNR at this time but is willing to discuss with palliative care.

## 2018-02-17 NOTE — PROGRESS NOTE ADULT - PROBLEM SELECTOR PLAN 1
both systolic and diastolic dysfunction on prior TTE.   c/w lasix 40mg iv bid - overall improved - follow up with cards regarding change to PO  monitor on telemetry  - monitor strict Is and Os and daily weights  cardiology consult appreciated  - TTE with worsening systolic and diastolic function with EF 10% and severe pulm htn  previous ct chest with pulm fibrosis   patient will need aggressive diuresis prior to repeat CT chest if needed.  Creatinine stable

## 2018-02-18 LAB
ANION GAP SERPL CALC-SCNC: 11 MMOL/L — SIGNIFICANT CHANGE UP (ref 5–17)
BUN SERPL-MCNC: 23 MG/DL — SIGNIFICANT CHANGE UP (ref 7–23)
CALCIUM SERPL-MCNC: 8.9 MG/DL — SIGNIFICANT CHANGE UP (ref 8.4–10.5)
CHLORIDE SERPL-SCNC: 97 MMOL/L — SIGNIFICANT CHANGE UP (ref 96–108)
CO2 SERPL-SCNC: 30 MMOL/L — SIGNIFICANT CHANGE UP (ref 22–31)
CREAT SERPL-MCNC: 1.06 MG/DL — SIGNIFICANT CHANGE UP (ref 0.5–1.3)
GLUCOSE SERPL-MCNC: 97 MG/DL — SIGNIFICANT CHANGE UP (ref 70–99)
MAGNESIUM SERPL-MCNC: 1.6 MG/DL — SIGNIFICANT CHANGE UP (ref 1.6–2.6)
POTASSIUM SERPL-MCNC: 3.9 MMOL/L — SIGNIFICANT CHANGE UP (ref 3.5–5.3)
POTASSIUM SERPL-SCNC: 3.9 MMOL/L — SIGNIFICANT CHANGE UP (ref 3.5–5.3)
SODIUM SERPL-SCNC: 138 MMOL/L — SIGNIFICANT CHANGE UP (ref 135–145)

## 2018-02-18 PROCEDURE — 99233 SBSQ HOSP IP/OBS HIGH 50: CPT

## 2018-02-18 RX ORDER — FUROSEMIDE 40 MG
40 TABLET ORAL
Qty: 0 | Refills: 0 | Status: DISCONTINUED | OUTPATIENT
Start: 2018-02-18 | End: 2018-02-20

## 2018-02-18 RX ADMIN — FINASTERIDE 5 MILLIGRAM(S): 5 TABLET, FILM COATED ORAL at 22:22

## 2018-02-18 RX ADMIN — HEPARIN SODIUM 5000 UNIT(S): 5000 INJECTION INTRAVENOUS; SUBCUTANEOUS at 12:41

## 2018-02-18 RX ADMIN — LOSARTAN POTASSIUM 50 MILLIGRAM(S): 100 TABLET, FILM COATED ORAL at 05:35

## 2018-02-18 RX ADMIN — ATORVASTATIN CALCIUM 10 MILLIGRAM(S): 80 TABLET, FILM COATED ORAL at 22:22

## 2018-02-18 RX ADMIN — Medication 1 TABLET(S): at 12:40

## 2018-02-18 RX ADMIN — Medication 40 MILLIGRAM(S): at 17:41

## 2018-02-18 RX ADMIN — Medication 40 MILLIGRAM(S): at 05:35

## 2018-02-18 RX ADMIN — HEPARIN SODIUM 5000 UNIT(S): 5000 INJECTION INTRAVENOUS; SUBCUTANEOUS at 05:35

## 2018-02-18 RX ADMIN — Medication 81 MILLIGRAM(S): at 12:40

## 2018-02-18 RX ADMIN — HEPARIN SODIUM 5000 UNIT(S): 5000 INJECTION INTRAVENOUS; SUBCUTANEOUS at 22:22

## 2018-02-18 NOTE — PROGRESS NOTE ADULT - SUBJECTIVE AND OBJECTIVE BOX
Patient is a 89y old  Male who presents with a chief complaint of shortness of breath (15 Feb 2018 04:08)      SUBJECTIVE / OVERNIGHT EVENTS: Patient seen and examined at bedside - patient feels well, overnight with some confusion but otherwise feeling well, sitting in chair  Tele 1st degree av block sinus 80-90s  ROS:  All other review of systems negative    Allergies    penicillin (Anaphylaxis)    Intolerances        MEDICATIONS  (STANDING):  aspirin enteric coated 81 milliGRAM(s) Oral daily  atorvastatin 10 milliGRAM(s) Oral at bedtime  finasteride 5 milliGRAM(s) Oral daily  furosemide    Tablet 40 milliGRAM(s) Oral two times a day  heparin  Injectable 5000 Unit(s) SubCutaneous every 8 hours  losartan 50 milliGRAM(s) Oral daily  multivitamin 1 Tablet(s) Oral daily    MEDICATIONS  (PRN):      Vital Signs Last 24 Hrs  T(C): 36.8 (18 Feb 2018 04:42), Max: 37.1 (17 Feb 2018 21:14)  T(F): 98.3 (18 Feb 2018 04:42), Max: 98.8 (17 Feb 2018 21:14)  HR: 85 (18 Feb 2018 04:42) (75 - 85)  BP: 120/64 (18 Feb 2018 04:42) (96/60 - 131/75)  BP(mean): --  RR: 18 (18 Feb 2018 04:42) (18 - 20)  SpO2: 94% (18 Feb 2018 04:42) (94% - 99%)  CAPILLARY BLOOD GLUCOSE        I&O's Summary    17 Feb 2018 07:01  -  18 Feb 2018 07:00  --------------------------------------------------------  IN: 600 mL / OUT: 2450 mL / NET: -1850 mL    18 Feb 2018 07:01  -  18 Feb 2018 11:06  --------------------------------------------------------  IN: 360 mL / OUT: 300 mL / NET: 60 mL        PHYSICAL EXAM:  GENERAL: NAD, well-developed  HEAD:  Atraumatic, Normocephalic  EYES: EOMI, PERRLA, conjunctiva and sclera clear  NECK: Supple, No JVD  CHEST/LUNG: Clear to auscultation bilaterally; No wheeze  HEART: Regular rate and rhythm; No murmurs, rubs, or gallops  ABDOMEN: Soft, Nontender, Nondistended; Bowel sounds present  EXTREMITIES:  2+ Peripheral Pulses, No clubbing, cyanosis, or edema  NEUROLOGY: AAOx2, non-focal  PSYCH: calm  SKIN: No rashes or lesions    LABS:    02-18    138  |  97  |  23  ----------------------------<  97  3.9   |  30  |  1.06    Ca    8.9      18 Feb 2018 06:04  Mg     1.6     02-18                Consultant(s) Notes Reviewed:  cards

## 2018-02-18 NOTE — PROGRESS NOTE ADULT - PROBLEM SELECTOR PLAN 1
both systolic and diastolic dysfunction on prior TTE.   change lasix to 40mg PO bid - overall improved  monitor on telemetry  - monitor strict Is and Os and daily weights  cardiology consult appreciated  - TTE with worsening systolic and diastolic function with EF 10% and severe pulm htn  previous ct chest with pulm fibrosis   patient will need aggressive diuresis prior to repeat CT chest if needed.  Creatinine stable

## 2018-02-19 DIAGNOSIS — R06.00 DYSPNEA, UNSPECIFIED: ICD-10-CM

## 2018-02-19 DIAGNOSIS — R41.0 DISORIENTATION, UNSPECIFIED: ICD-10-CM

## 2018-02-19 DIAGNOSIS — Z51.5 ENCOUNTER FOR PALLIATIVE CARE: ICD-10-CM

## 2018-02-19 LAB — GLUCOSE BLDC GLUCOMTR-MCNC: 96 MG/DL — SIGNIFICANT CHANGE UP (ref 70–99)

## 2018-02-19 PROCEDURE — 99223 1ST HOSP IP/OBS HIGH 75: CPT

## 2018-02-19 PROCEDURE — 99233 SBSQ HOSP IP/OBS HIGH 50: CPT | Mod: GC

## 2018-02-19 PROCEDURE — 99233 SBSQ HOSP IP/OBS HIGH 50: CPT

## 2018-02-19 RX ORDER — ACETAMINOPHEN 500 MG
650 TABLET ORAL ONCE
Qty: 0 | Refills: 0 | Status: COMPLETED | OUTPATIENT
Start: 2018-02-19 | End: 2018-02-19

## 2018-02-19 RX ORDER — METOPROLOL TARTRATE 50 MG
12.5 TABLET ORAL
Qty: 0 | Refills: 0 | Status: DISCONTINUED | OUTPATIENT
Start: 2018-02-19 | End: 2018-02-20

## 2018-02-19 RX ADMIN — ATORVASTATIN CALCIUM 10 MILLIGRAM(S): 80 TABLET, FILM COATED ORAL at 21:23

## 2018-02-19 RX ADMIN — HEPARIN SODIUM 5000 UNIT(S): 5000 INJECTION INTRAVENOUS; SUBCUTANEOUS at 13:20

## 2018-02-19 RX ADMIN — Medication 1 TABLET(S): at 13:20

## 2018-02-19 RX ADMIN — Medication 12.5 MILLIGRAM(S): at 12:11

## 2018-02-19 RX ADMIN — Medication 40 MILLIGRAM(S): at 05:31

## 2018-02-19 RX ADMIN — Medication 650 MILLIGRAM(S): at 21:23

## 2018-02-19 RX ADMIN — Medication 81 MILLIGRAM(S): at 13:20

## 2018-02-19 RX ADMIN — LOSARTAN POTASSIUM 50 MILLIGRAM(S): 100 TABLET, FILM COATED ORAL at 05:31

## 2018-02-19 RX ADMIN — FINASTERIDE 5 MILLIGRAM(S): 5 TABLET, FILM COATED ORAL at 21:23

## 2018-02-19 RX ADMIN — Medication 12.5 MILLIGRAM(S): at 21:23

## 2018-02-19 RX ADMIN — HEPARIN SODIUM 5000 UNIT(S): 5000 INJECTION INTRAVENOUS; SUBCUTANEOUS at 05:31

## 2018-02-19 RX ADMIN — Medication 40 MILLIGRAM(S): at 17:49

## 2018-02-19 RX ADMIN — HEPARIN SODIUM 5000 UNIT(S): 5000 INJECTION INTRAVENOUS; SUBCUTANEOUS at 21:22

## 2018-02-19 NOTE — PROGRESS NOTE ADULT - SUBJECTIVE AND OBJECTIVE BOX
24H hour events:   At bedside, patient sitting in bed . Has no complaints. Denies chest pain, dyspnea, dizziness, syncope, presyncope, orthopnea, edema, PND.     MEDICATIONS:  aspirin enteric coated 81 milliGRAM(s) Oral daily  furosemide    Tablet 40 milliGRAM(s) Oral two times a day  heparin  Injectable 5000 Unit(s) SubCutaneous every 8 hours  losartan 50 milliGRAM(s) Oral daily            atorvastatin 10 milliGRAM(s) Oral at bedtime  finasteride 5 milliGRAM(s) Oral daily    multivitamin 1 Tablet(s) Oral daily        PHYSICAL EXAM:  T(C): 36.9 (02-19-18 @ 05:05), Max: 37.3 (02-18-18 @ 13:53)  HR: 91 (02-19-18 @ 05:05) (79 - 91)  BP: 101/63 (02-19-18 @ 05:05) (96/63 - 113/74)  RR: 18 (02-19-18 @ 05:05) (18 - 19)  SpO2: 94% (02-19-18 @ 05:05) (93% - 94%)  Wt(kg): --  I&O's Summary    18 Feb 2018 07:01  -  19 Feb 2018 07:00  --------------------------------------------------------  IN: 600 mL / OUT: 500 mL / NET: 100 mL        Appearance: Normal	  HEENT:   Normal oral mucosa  Lymphatic: No lymphadenopathy  Cardiovascular: Normal S1 S2,         No JVD,      No murmurs,      No edema  Respiratory: Lungs clear to auscultation	  Psychiatry: Mood & affect appropriate  Gastrointestinal:  Soft, Non-tender	  Skin: No rashes, No ecchymoses, No cyanosis	  Neurologic: Non-focal  Extremities: Normal range of motion, No clubbing, cyanosis   Vascular: warm extremities        LABS:	 	      02-18    138  |  97  |  23  ----------------------------<  97  3.9   |  30  |  1.06    Ca    8.9      18 Feb 2018 06:04  Mg     1.6     02-18        proBNP: Serum Pro-Brain Natriuretic Peptide: 6506 pg/mL (02-14-18 @ 19:47)      TELEMETRY: 	  Sinus 70-90 Cardiology Progress Note  Consult for: SOB/HFpEF    24H hour events:   At bedside, patient sitting in bed . Has no complaints. Denies chest pain, dyspnea, dizziness, syncope, presyncope, orthopnea, edema, PND.     REVIEW OF SYSTEMS:  Not completely reliable given confusion   Constitutional: No Fever, Fatigue, Weight Changes  Eyes: No Recent Vision Changes, Eye Pain  ENT: No Congestion, Ear Pain, Sore Throat  Endocrine: No Excess Sweating, Temperature Intolerance  Cardiovascular: No Chest Pain, Palpitations, Shortness of Breath, Pre-syncope, Syncope, LE Edema  Respiratory: No Cough, Congestion, Wheezing  Gastrointestinal: No Abdominal Pain, Nausea, Vomiting  Genitourinary: No dysuria, hematuria  Musculoskeletal: No Joint Pain, Swelling  Neurologic: No headaches, Imbalance, Focal Deficits  Skin: No rashes, hematoma, purprura      MEDICATIONS:  aspirin enteric coated 81 milliGRAM(s) Oral daily  furosemide    Tablet 40 milliGRAM(s) Oral two times a day  heparin  Injectable 5000 Unit(s) SubCutaneous every 8 hours  losartan 50 milliGRAM(s) Oral daily            atorvastatin 10 milliGRAM(s) Oral at bedtime  finasteride 5 milliGRAM(s) Oral daily    multivitamin 1 Tablet(s) Oral daily        PHYSICAL EXAM:  T(C): 36.9 (02-19-18 @ 05:05), Max: 37.3 (02-18-18 @ 13:53)  HR: 91 (02-19-18 @ 05:05) (79 - 91)  BP: 101/63 (02-19-18 @ 05:05) (96/63 - 113/74)  RR: 18 (02-19-18 @ 05:05) (18 - 19)  SpO2: 94% (02-19-18 @ 05:05) (93% - 94%)  Wt(kg): --  I&O's Summary    18 Feb 2018 07:01  -  19 Feb 2018 07:00  --------------------------------------------------------  IN: 600 mL / OUT: 500 mL / NET: 100 mL        Appearance: Normal	  HEENT:   Normal oral mucosa  Lymphatic: No lymphadenopathy  Cardiovascular: Normal S1 S2,         No JVD,      No murmurs,      No edema  Respiratory: Lungs clear to auscultation	  Psychiatry: Mood & affect appropriate  Gastrointestinal:  Soft, Non-tender	  Skin: No rashes, No ecchymoses, No cyanosis	  Neurologic: Non-focal  Extremities: Normal range of motion, No clubbing, cyanosis   Vascular: warm extremities        LABS:	 	      02-18    138  |  97  |  23  ----------------------------<  97  3.9   |  30  |  1.06    Ca    8.9      18 Feb 2018 06:04  Mg     1.6     02-18        proBNP: Serum Pro-Brain Natriuretic Peptide: 6506 pg/mL (02-14-18 @ 19:47)      TELEMETRY: 	  Sinus 70-90

## 2018-02-19 NOTE — PROGRESS NOTE ADULT - SUBJECTIVE AND OBJECTIVE BOX
Patient is a 89y old  Male who presents with a chief complaint of shortness of breath (15 Feb 2018 04:08)      SUBJECTIVE / OVERNIGHT EVENTS: Patient seen and examined at bedside - patient REJI, says he feels good, he is confused but not agitated. Denies, sob, cp    ROS:  All other review of systems negative    Allergies    penicillin (Anaphylaxis)    Intolerances        MEDICATIONS  (STANDING):  aspirin enteric coated 81 milliGRAM(s) Oral daily  atorvastatin 10 milliGRAM(s) Oral at bedtime  finasteride 5 milliGRAM(s) Oral daily  furosemide    Tablet 40 milliGRAM(s) Oral two times a day  heparin  Injectable 5000 Unit(s) SubCutaneous every 8 hours  losartan 50 milliGRAM(s) Oral daily  multivitamin 1 Tablet(s) Oral daily    MEDICATIONS  (PRN):      Vital Signs Last 24 Hrs  T(C): 36.9 (19 Feb 2018 05:05), Max: 37.3 (18 Feb 2018 13:53)  T(F): 98.4 (19 Feb 2018 05:05), Max: 99.1 (18 Feb 2018 13:53)  HR: 91 (19 Feb 2018 05:05) (79 - 91)  BP: 101/63 (19 Feb 2018 05:05) (96/63 - 113/74)  BP(mean): --  RR: 18 (19 Feb 2018 05:05) (18 - 19)  SpO2: 94% (19 Feb 2018 05:05) (93% - 94%)  CAPILLARY BLOOD GLUCOSE        I&O's Summary    18 Feb 2018 07:01  -  19 Feb 2018 07:00  --------------------------------------------------------  IN: 600 mL / OUT: 500 mL / NET: 100 mL        PHYSICAL EXAM:  GENERAL: NAD, well-developed  HEAD:  Atraumatic, Normocephalic  EYES: EOMI, PERRLA, conjunctiva and sclera clear  NECK: Supple, No JVD  CHEST/LUNG: Clear to auscultation bilaterally; No wheeze  HEART: Regular rate and rhythm; No murmurs, rubs, or gallops  ABDOMEN: Soft, Nontender, Nondistended; Bowel sounds present  EXTREMITIES:  2+ Peripheral Pulses, No clubbing, cyanosis, or edema  NEUROLOGY: AAOx2, non-focal  PSYCH: calm  SKIN: No rashes or lesions    LABS:    02-18    138  |  97  |  23  ----------------------------<  97  3.9   |  30  |  1.06    Ca    8.9      18 Feb 2018 06:04  Mg     1.6     02-18                Consultant(s) Notes Reviewed:  cards    Case Discussed with Family: Called son 2/18 - no response, called son 2/19 - left message

## 2018-02-19 NOTE — PROGRESS NOTE ADULT - ATTENDING COMMENTS
Agree with above.  Given relatively lower BP, can switch back to Lasix 40 alternating 20 (home dose)

## 2018-02-19 NOTE — CONSULT NOTE ADULT - ASSESSMENT
A/P:   89 yo M w/PMH of systolic and diastolic heart failure, HTN, HLD, BPH, bladder CA s/p resection, CVA without residual deficits, severe presbycusis , pulmonary fibrosis ? who presented with complaint of worsening SOB x 3 weeks. Exam is notable for bilateral crackles and labs notable for elevated proBNP c/w pulmonary edema. EKG is noted for SR with 1st deg AV block and RBBB. TTE notable for segmental WMA and elevated PASP, likely either from underlying undiagnosed lung disease or left sided heart failure. This clinical presentation is likely consistent with ADHF on top of undiagnosed primary lung pathology.     #ADHF:  - continue Lasix 40 IV BId  - goal net neg 1 L  - BID BMP, Mg K> 4 Mg >2   - obtain pulmonary input based on fibrosis noted on CT scan in 11/2017 and the possible need for repeat chest CT   - consider repeat TTE  - will discuss with attending the role of RHC + LHC once euvolemic.     #weight loss - concerning in the setting of hx of bladder CA    Santiago Jha MD   Cardiology Fellow  x [phone]
Patient is a 89 yo M w Heart Failure (moderate diastolic dysfunction (Stage II) and mild systolic disfucntion), HTN, HLD, BPH, bladder CA s/p resection, CVA without residual deficits, severe presbycusis, and pulmonary fibrosis who presented with complaint of worsening SOB x 3 weeks. He is currently treated for HF exacerbation with TTE showing worsening systolic and diastolic function with EF 10% and severe pulm htn. This consult is called for GOC.
89 M former 35 pack year smoker, with HTN, HLD, CVA, systolic and diastolic HF, pulm fibrosis, bladder CA s/p resection, BPH, admitted with progressively worsening shortness of breath and bilateral LE edema, found to have acute decompensated HF with an EF of 10 %, and acute pulm edema superimposed on pulm fibrosis    1. Pulmonary Fibrosis/ PH  - Ct chest from 11/17 reviewed and shows pulm fibrotic changes with superimposed pulm edema  - Current symptoms of SOB are likely related to his newly reduced EF with acute pulm edema  - PH is also likely predominantly secondary to L heart disease.   - Resp status improving with diuresis, would cont to keep negative fluid balance  - No acute intervention required from pulm standpoint. No role for repeat chest CT until euvolemic. Once he improves he can FUP as an oupt with pulm ([phone]) for PFTs  - Keep O2 sat > 88 %  - Incentive spirometry    2. Acute decompensated HF:  - Agree with IV diuresis with Lasix 40 mg BID  - Follow strict I/Os, daily weights  - Goal to keep at least 1 L negative daily  - Cont ASA, Lipitor, losartan   - Would continue to optimize medical management for HF, and FUP with cardiology regarding ? role for cath.   - Given age and comorbidities would suggest a palliative care evaluation  3. Gen:  - DVT px:  Hep SQ  - For Qs over the weekend please call [phone]

## 2018-02-19 NOTE — CONSULT NOTE ADULT - PROBLEM SELECTOR RECOMMENDATION 9
2/2 to CHF and pulmonary fibrosis   Spoke with HCP that indicated current plans was for "waiting and seen if the patient was to improve with the current treatments." His HCP then have to leave to receive his mother that was coming home from HD. The HCP will come tomorrow at 12 for a FM. Will appreciate primary team to be present during the FM. FM to also get a clear input from pulmonary and cardio in regards to the patient's plan of care and prognosis before the FM.

## 2018-02-19 NOTE — CONSULT NOTE ADULT - SUBJECTIVE AND OBJECTIVE BOX
HPI:  Patient is a 89 yo M w Heart Failure (moderate diastolic dysfunction (Stage II) and mild systolic disfucntion), HTN, HLD, BPH, bladder CA s/p resection, CVA without residual deficits, severe presbycusis, and pulmonary fibrosis who presented with complaint of worsening SOB x 3 weeks. As per EMR "The patient reports that over the past 3 weeks he has had progressively worsening shortness of breath associated with worsening of his bilateral LE edema. He reports also having a non-productive cough for the same period of time. Denies chest pain, fevers, chills, nausea, vomiting, diarrhea. Reports he tried an ipratropium nebulizer "about 6 times" with mild improvement of his symptoms. Also reports worsening SOB with lying flat, denies exertional symptoms." He is currently treated for HF exacerbation with TTE showing worsening systolic and diastolic function with EF 10% and severe pulm htn. This consult is called for GOC.       PERTINENT PMH REVIEWED:  [ ] YES [ ] NO           SOCIAL HISTORY:   Significant other/partner:                Children:     Yes              Adventist/Spirituality: Anabaptist   Substance hx:  [ ] YES   [x] NO                   Tobacco hx:  [ ] YES  [ ] NO                       Alcohol hx: [ ] YES  [ ] NO         Home Opioid hx:  [ ] YES  [x ] NO   Living Situation: [ ] Home  [ ] Long term care  [ ] Rehab [ ] Other  As per  notes: "  Patient  resides with  his spouse in a PVT house, mostly independent with  ADLs;   Patient is designating son Evan at [phone] as primary caregiver;  PT  evaluation read and appreciated/ with no skilled  recommendation"  FAMILY HISTORY:  No pertinent family history in first degree relatives    [ ] Family history non-contributory     BASELINE (I)ADLs (prior to admission):  [place]: [ ] total  [ ] moderate [ ] dependent    ADVANCE DIRECTIVES:    DNR [ ] YES [ ] NO                            [ ] Completed  MOLST  [ ] YES [ ] NO                      [ ] Completed  Health Care Proxy [ ] YES  [ ] NO   [ ] Completed  Living Will  [ ] YES [ ] NO             [x ] Surrogate  [ ] HCP  [ ] Guardian:        Wife                                                           Phone#:    Allergies    penicillin (Anaphylaxis)    Intolerances        MEDICATIONS  (STANDING):  aspirin enteric coated 81 milliGRAM(s) Oral daily  atorvastatin 10 milliGRAM(s) Oral at bedtime  finasteride 5 milliGRAM(s) Oral daily  furosemide    Tablet 40 milliGRAM(s) Oral two times a day  heparin  Injectable 5000 Unit(s) SubCutaneous every 8 hours  losartan 50 milliGRAM(s) Oral daily  metoprolol     tartrate 12.5 milliGRAM(s) Oral two times a day  multivitamin 1 Tablet(s) Oral daily    MEDICATIONS  (PRN):      PRESENT SYMPTOMS:  Source: [ ] Patient   [ ] Family   [ ] Team     Pain:                        [ ] No [ ] Yes             [ ] Mild [ ] Moderate [ ] Severe    Onset -  Location -  Duration -  Character -  Alleviating/Aggravating -  Radiation -  Timing -      Dyspnea:                [ ] No [ ] Yes             [ ] Mild [ ] Moderate [ ] Severe    Anxiety:                  [ ] No [ ] Yes             [ ] Mild [ ] Moderate [ ] Severe    Fatigue:                  [ ] No [ ] Yes             [ ] Mild [ ] Moderate [ ] Severe    Nausea:                  [ ] No [ ] Yes             [ ] Mild [ ] Moderate [ ] Severe    Loss of appetite:   [ ] No [ ] Yes             [ ] Mild [ ] Moderate [ ] Severe    Constipation:        [ ] No [ ] Yes             [ ] Mild [ ] Moderate [ ] Severe    Other Symptoms:  [ ] All other review of systems negative   [ ] Unable to obtain due to poor mentation     Karnofsky Performance Score/Palliative Performance Status Version 2:         %    PHYSICAL EXAM:  Vital Signs Last 24 Hrs  T(C): 38.1 (19 Feb 2018 13:54), Max: 38.1 (19 Feb 2018 13:54)  T(F): 100.5 (19 Feb 2018 13:54), Max: 100.5 (19 Feb 2018 13:54)  HR: 93 (19 Feb 2018 13:54) (79 - 93)  BP: 110/69 (19 Feb 2018 13:54) (101/63 - 113/74)  BP(mean): --  RR: 18 (19 Feb 2018 13:54) (18 - 18)  SpO2: 94% (19 Feb 2018 13:54) (93% - 94%) I&O's Summary    18 Feb 2018 07:01  -  19 Feb 2018 07:00  --------------------------------------------------------  IN: 600 mL / OUT: 500 mL / NET: 100 mL    19 Feb 2018 07:01  -  19 Feb 2018 14:56  --------------------------------------------------------  IN: 240 mL / OUT: 0 mL / NET: 240 mL        General:  [  ] Normal. Alert, Oriented x 3.     HEENT:  [ ] Normal   [ ] Dry mouth   [ ] ET Tube    [ ] Trach  [ ] Oral lesions    Lungs:   [ ] Clear [ ] Tachypnea  [ ] Audible excessive secretions   [ ] Rhonchi        [ ] Right [ ] Left [ ] Bilateral  [ ] Crackles        [ ] Right [ ] Left [ ] Bilateral  [ ] Wheezing     [ ] Right [ ] Left [ ] Bilateral    Cardiovascular:  S1, S2. No S3. No murmurs     Abdomen: [ ] Soft  [ ] Distended   [ ] +BS  [ ] Non tender [ ] Tender  [ ]PEG   [ ]OGT/ NGT   Last BM:       Genitourinary: [ ] Normal [ ] Incontinent   [ ] Oliguria/Anuria   [ ] Rowe    Musculoskeletal:  [ ] Normal   [ ] Weakness  [ ] Bedbound/Wheelchair bound [ ] Edema    Neurological: [ ] No focal deficits  [ ] Cognitive impairment  [ ] Dysphagia [ ] Dysarthria [ ] Paresis [ ] Other     Skin: [ ] Normal   [ ] Pressure ulcer(s)                  [ ] Rash    LABS:    02-18    138  |  97  |  23  ----------------------------<  97  3.9   |  30  |  1.06    Ca    8.9      18 Feb 2018 06:04  Mg     1.6     02-18            Shock: [ ] Septic [ ] Cardiogenic [ ] Neurologic [ ] Hypovolemic  Vasopressors x   Inotrophs x     Protein Calorie Malnutrition: [ ] Mild [ ] Moderate [ ] Severe    Oral Intake: [ ] Unable/mouth care only [ ] Minimal [ ] Moderate [ ] Full Capability  Diet: [ ] NPO [ ] Tube feeds [ ] TPN [ ] Other     RADIOLOGY & ADDITIONAL STUDIES:  < from: CT Chest No Cont (11.23.17 @ 08:42) >  EXAM:  CT CHEST                            PROCEDURE DATE:  11/23/2017            INTERPRETATION:  CLINICAL INFORMATION: Shortness of breath.    COMPARISON: None.    PROCEDURE:   CT of the Chest was performed without intravenous contrast.  [place] coronal reformats were performed.  IMPRESSION:     Pulmonary fibrosis with superimposed pulmonary edema.    Small bilateral pleural effusions.    < end of copied text >    REFERRALS:   [ ] Chaplaincy  [ ] Hospice  [ ] Child Life  [ ] Social Work  [ ] Case management [ ] Holistic Therapy     Salvador Masterson MD [phone]  Assessment and Plan: HPI:  Patient is a 89 yo M w Heart Failure (moderate diastolic dysfunction (Stage II) and mild systolic disfucntion), HTN, HLD, BPH, bladder CA s/p resection, CVA without residual deficits, severe presbycusis, and pulmonary fibrosis who presented with complaint of worsening SOB x 3 weeks. As per EMR "The patient reports that over the past 3 weeks he has had progressively worsening shortness of breath associated with worsening of his bilateral LE edema. He reports also having a non-productive cough for the same period of time. Denies chest pain, fevers, chills, nausea, vomiting, diarrhea. Reports he tried an ipratropium nebulizer "about 6 times" with mild improvement of his symptoms. Also reports worsening SOB with lying flat, denies exertional symptoms." He is currently treated for HF exacerbation with TTE showing worsening systolic and diastolic function with EF 10% and severe pulm htn. This consult is called for GOC.       PERTINENT PMH REVIEWED:  [x ] YES [ ] NO           SOCIAL HISTORY:   Significant other/partner:                Children:     Yes              Yarsani/Spirituality: Judaism   Substance hx:  [ ] YES   [x] NO                     He is a never smoker, denies EtOH or recreational drug use. He lives with his wife and has HHA for 7 days of the week.      Home Opioid hx:  [ ] YES  [x ] NO   Living Situation: [x ] Home  [ ] Long term care  [ ] Rehab [ ] Other.  As per  notes: "  Patient  resides with  his spouse in a PVT house, mostly independent with  ADLs;   Patient is designating son Evan at [phone] as primary caregiver;  PT  evaluation read and appreciated/ with no skilled  recommendation"  FAMILY HISTORY:  No pertinent family history in first degree relatives    [ ] Family history non-contributory     BASELINE (I)ADLs (prior to admission):  [place]: [ ] total  [ ] moderate [ ] dependent    ADVANCE DIRECTIVES:    DNR [ ] YES [ ] NO                            [ ] Completed  MOLST  [ ] YES [ ] NO                      [ ] Completed  Health Care Proxy [ ] YES  [ ] NO   [ ] Completed  Living Will  [ ] YES [ ] NO             [ ] Surrogate  [ x] HCP  [ ] Guardian:    Son as above. Hi son is to bring the HCP form                                                           Phone#:    Allergies    penicillin (Anaphylaxis)    Intolerances        MEDICATIONS  (STANDING):  aspirin enteric coated 81 milliGRAM(s) Oral daily  atorvastatin 10 milliGRAM(s) Oral at bedtime  finasteride 5 milliGRAM(s) Oral daily  furosemide    Tablet 40 milliGRAM(s) Oral two times a day  heparin  Injectable 5000 Unit(s) SubCutaneous every 8 hours  losartan 50 milliGRAM(s) Oral daily  metoprolol     tartrate 12.5 milliGRAM(s) Oral two times a day  multivitamin 1 Tablet(s) Oral daily    MEDICATIONS  (PRN):      PRESENT SYMPTOMS:  Source: [x ] Patient   [x ] Family   [ ] Team     Pain:                        [x ] No [ ] Yes             [ ] Mild [ ] Moderate [ ] Severe    Onset -  Location -  Duration -  Character -  Alleviating/Aggravating -  Radiation -  Timing -      Dyspnea:                [x ] No [ ] Yes             [ ] Mild [ ] Moderate [ ] Severe    Anxiety:                  [x ] No [ ] Yes             [ ] Mild [ ] Moderate [ ] Severe    Fatigue:                  [x ] No [ ] Yes             [ ] Mild [ ] Moderate [ ] Severe    Nausea:                  [x ] No [ ] Yes             [ ] Mild [ ] Moderate [ ] Severe    Loss of appetite:   [x ] No [ ] Yes             [ ] Mild [ ] Moderate [ ] Severe    Constipation:        [ ] No [x ] Yes             [ ] Mild [ ] Moderate [ ] Severe    Other Symptoms:  [ ] All other review of systems negative   [ ] Unable to obtain due to poor mentation     Karnofsky Performance Score/Palliative Performance Status Version 2:         %    PHYSICAL EXAM:  Vital Signs Last 24 Hrs  T(C): 38.1 (19 Feb 2018 13:54), Max: 38.1 (19 Feb 2018 13:54)  T(F): 100.5 (19 Feb 2018 13:54), Max: 100.5 (19 Feb 2018 13:54)  HR: 93 (19 Feb 2018 13:54) (79 - 93)  BP: 110/69 (19 Feb 2018 13:54) (101/63 - 113/74)  BP(mean): --  RR: 18 (19 Feb 2018 13:54) (18 - 18)  SpO2: 94% (19 Feb 2018 13:54) (93% - 94%) I&O's Summary    18 Feb 2018 07:01  -  19 Feb 2018 07:00  --------------------------------------------------------  IN: 600 mL / OUT: 500 mL / NET: 100 mL    19 Feb 2018 07:01  -  19 Feb 2018 14:56  --------------------------------------------------------  IN: 240 mL / OUT: 0 mL / NET: 240 mL        General:  [ x ] Elderly male. Oriented to person only. Confused.     HEENT:  [x ] Normal   [ ] Dry mouth   [ ] ET Tube    [ ] Trach  [ ] Oral lesions    Lungs:   [x ] Clear [ ] Tachypnea  [ ] Audible excessive secretions   [ ] Rhonchi        [ ] Right [ ] Left [ ] Bilateral  [ ] Crackles        [ ] Right [ ] Left [ ] Bilateral  [ ] Wheezing     [ ] Right [ ] Left [ ] Bilateral    Cardiovascular:  S1, S2. No S3. No murmurs     Abdomen: [x ] Soft  [ ] Distended   [x ] +BS  [ ] Non tender [ ] Tender  [ ]PEG   [ ]OGT/ NGT   Last BM:   2/16    Genitourinary: [x ] Normal [ ] Incontinent   [ ] Oliguria/Anuria   [ ] Rowe    Musculoskeletal:  [ ] Normal   [x ] Weakness  [ ] Bedbound/Wheelchair bound [ ] Edema    Neurological: [x ] No focal deficits  [x ] Cognitive impairment  [ ] Dysphagia [ ] Dysarthria [ ] Paresis [x ] Other:  Delirium     Skin: [x ] Normal   [ ] Pressure ulcer(s)                  [ ] Rash    LABS:    02-18    138  |  97  |  23  ----------------------------<  97  3.9   |  30  |  1.06    Ca    8.9      18 Feb 2018 06:04  Mg     1.6     02-18            Shock: [ ] Septic [ ] Cardiogenic [ ] Neurologic [ ] Hypovolemic  Vasopressors x   Inotrophs x     Protein Calorie Malnutrition: [ ] Mild [ ] Moderate [ ] Severe    Oral Intake: [ ] Unable/mouth care only [ ] Minimal [ ] Moderate [ ] Full Capability  Diet: [ ] NPO [ ] Tube feeds [ ] TPN [ ] Other     RADIOLOGY & ADDITIONAL STUDIES:  < from: CT Chest No Cont (11.23.17 @ 08:42) >  EXAM:  CT CHEST                            PROCEDURE DATE:  11/23/2017            INTERPRETATION:  CLINICAL INFORMATION: Shortness of breath.    COMPARISON: None.    PROCEDURE:   CT of the Chest was performed without intravenous contrast.  [place] coronal reformats were performed.  IMPRESSION:     Pulmonary fibrosis with superimposed pulmonary edema.    Small bilateral pleural effusions.    < end of copied text >    REFERRALS:   [ ] Chaplaincy  [ ] Hospice  [ ] Child Life  [ ] Social Work  [ ] Case management [ ] Holistic Therapy     Salvador Masterson MD [phone]  Assessment and Plan:

## 2018-02-19 NOTE — PROGRESS NOTE ADULT - PROBLEM SELECTOR PLAN 1
both systolic and diastolic dysfunction on prior TTE.   change lasix to 40mg PO bid - overall improved  monitor on telemetry  - monitor strict Is and Os and daily weights  cardiology consult appreciated  - TTE with worsening systolic and diastolic function with EF 10% and severe pulm htn  will introduce low dose BB - metoprolol 12.5mg bid. c/w lasix, losartan  previous ct chest with pulm fibrosis   patient will need aggressive diuresis prior to repeat CT chest if needed.  Creatinine stable both systolic and diastolic dysfunction on prior TTE.   change lasix to 40mg PO bid - overall improved  monitor on telemetry  - monitor strict Is and Os and daily weights  cardiology consult appreciated  - TTE with worsening systolic and diastolic function with EF 10% and severe pulm htn  will introduce low dose BB - metoprolol 12.5mg bid. c/w lasix, losartan  Monitor BP - may need to decrease cozaar to 25mg if BP does not tolerate  previous ct chest with pulm fibrosis   patient will need aggressive diuresis prior to repeat CT chest if needed.  Creatinine stable

## 2018-02-19 NOTE — PROGRESS NOTE ADULT - ATTENDING COMMENTS
Patient will need d/c to either [facility] or home with [facility].  Attempted to contact son yesterday and today.

## 2018-02-19 NOTE — CONSULT NOTE ADULT - PROBLEM SELECTOR RECOMMENDATION 2
Likely Multifactorial ( Metabolic, meds, hospital environment)  As per his son, the patient did not have a cognitive impairment before this admission. However, he indicated that the patient usually becomes Delirious while in the hospital and then getting better when returning back home.  Promote orientation and familiar faces or family by the bedside.   If severe agitation, may need to consider Ativan 0.25 mg IV q8 PRN once QTc > 500

## 2018-02-20 ENCOUNTER — TRANSCRIPTION ENCOUNTER (OUTPATIENT)
Age: 83
End: 2018-02-20

## 2018-02-20 VITALS — WEIGHT: 150.8 LBS

## 2018-02-20 DIAGNOSIS — Z29.9 ENCOUNTER FOR PROPHYLACTIC MEASURES, UNSPECIFIED: ICD-10-CM

## 2018-02-20 DIAGNOSIS — I27.20 PULMONARY HYPERTENSION, UNSPECIFIED: ICD-10-CM

## 2018-02-20 DIAGNOSIS — J84.10 PULMONARY FIBROSIS, UNSPECIFIED: ICD-10-CM

## 2018-02-20 LAB
ANION GAP SERPL CALC-SCNC: 11 MMOL/L — SIGNIFICANT CHANGE UP (ref 5–17)
APPEARANCE UR: CLEAR — SIGNIFICANT CHANGE UP
BILIRUB UR-MCNC: NEGATIVE — SIGNIFICANT CHANGE UP
BUN SERPL-MCNC: 27 MG/DL — HIGH (ref 7–23)
CALCIUM SERPL-MCNC: 8.8 MG/DL — SIGNIFICANT CHANGE UP (ref 8.4–10.5)
CHLORIDE SERPL-SCNC: 97 MMOL/L — SIGNIFICANT CHANGE UP (ref 96–108)
CO2 SERPL-SCNC: 27 MMOL/L — SIGNIFICANT CHANGE UP (ref 22–31)
COLOR SPEC: YELLOW — SIGNIFICANT CHANGE UP
CREAT SERPL-MCNC: 1.13 MG/DL — SIGNIFICANT CHANGE UP (ref 0.5–1.3)
DIFF PNL FLD: NEGATIVE — SIGNIFICANT CHANGE UP
GLUCOSE SERPL-MCNC: 96 MG/DL — SIGNIFICANT CHANGE UP (ref 70–99)
GLUCOSE UR QL: NEGATIVE — SIGNIFICANT CHANGE UP
HCT VFR BLD CALC: 39.6 % — SIGNIFICANT CHANGE UP (ref 39–50)
HGB BLD-MCNC: 13.4 G/DL — SIGNIFICANT CHANGE UP (ref 13–17)
KETONES UR-MCNC: NEGATIVE — SIGNIFICANT CHANGE UP
LEUKOCYTE ESTERASE UR-ACNC: NEGATIVE — SIGNIFICANT CHANGE UP
MCHC RBC-ENTMCNC: 32.5 PG — SIGNIFICANT CHANGE UP (ref 27–34)
MCHC RBC-ENTMCNC: 34 GM/DL — SIGNIFICANT CHANGE UP (ref 32–36)
MCV RBC AUTO: 95.8 FL — SIGNIFICANT CHANGE UP (ref 80–100)
NITRITE UR-MCNC: NEGATIVE — SIGNIFICANT CHANGE UP
PH UR: 7 — SIGNIFICANT CHANGE UP (ref 5–8)
PLATELET # BLD AUTO: 218 K/UL — SIGNIFICANT CHANGE UP (ref 150–400)
POTASSIUM SERPL-MCNC: 4.1 MMOL/L — SIGNIFICANT CHANGE UP (ref 3.5–5.3)
POTASSIUM SERPL-SCNC: 4.1 MMOL/L — SIGNIFICANT CHANGE UP (ref 3.5–5.3)
PROT UR-MCNC: SIGNIFICANT CHANGE UP
RBC # BLD: 4.13 M/UL — LOW (ref 4.2–5.8)
RBC # FLD: 14.6 % — HIGH (ref 10.3–14.5)
SODIUM SERPL-SCNC: 135 MMOL/L — SIGNIFICANT CHANGE UP (ref 135–145)
SP GR SPEC: 1.01 — SIGNIFICANT CHANGE UP (ref 1.01–1.02)
UROBILINOGEN FLD QL: 4 MG/DL
WBC # BLD: 5.7 K/UL — SIGNIFICANT CHANGE UP (ref 3.8–10.5)
WBC # FLD AUTO: 5.7 K/UL — SIGNIFICANT CHANGE UP (ref 3.8–10.5)

## 2018-02-20 PROCEDURE — 85027 COMPLETE CBC AUTOMATED: CPT

## 2018-02-20 PROCEDURE — 96374 THER/PROPH/DIAG INJ IV PUSH: CPT

## 2018-02-20 PROCEDURE — 99233 SBSQ HOSP IP/OBS HIGH 50: CPT | Mod: GC

## 2018-02-20 PROCEDURE — 81003 URINALYSIS AUTO W/O SCOPE: CPT

## 2018-02-20 PROCEDURE — 85014 HEMATOCRIT: CPT

## 2018-02-20 PROCEDURE — 93005 ELECTROCARDIOGRAM TRACING: CPT

## 2018-02-20 PROCEDURE — 87040 BLOOD CULTURE FOR BACTERIA: CPT

## 2018-02-20 PROCEDURE — 82553 CREATINE MB FRACTION: CPT

## 2018-02-20 PROCEDURE — 82435 ASSAY OF BLOOD CHLORIDE: CPT

## 2018-02-20 PROCEDURE — 87581 M.PNEUMON DNA AMP PROBE: CPT

## 2018-02-20 PROCEDURE — 85730 THROMBOPLASTIN TIME PARTIAL: CPT

## 2018-02-20 PROCEDURE — 99239 HOSP IP/OBS DSCHRG MGMT >30: CPT

## 2018-02-20 PROCEDURE — 99233 SBSQ HOSP IP/OBS HIGH 50: CPT

## 2018-02-20 PROCEDURE — 71045 X-RAY EXAM CHEST 1 VIEW: CPT

## 2018-02-20 PROCEDURE — 80048 BASIC METABOLIC PNL TOTAL CA: CPT

## 2018-02-20 PROCEDURE — 84484 ASSAY OF TROPONIN QUANT: CPT

## 2018-02-20 PROCEDURE — 82962 GLUCOSE BLOOD TEST: CPT

## 2018-02-20 PROCEDURE — 87486 CHLMYD PNEUM DNA AMP PROBE: CPT

## 2018-02-20 PROCEDURE — 83880 ASSAY OF NATRIURETIC PEPTIDE: CPT

## 2018-02-20 PROCEDURE — 82550 ASSAY OF CK (CPK): CPT

## 2018-02-20 PROCEDURE — 82803 BLOOD GASES ANY COMBINATION: CPT

## 2018-02-20 PROCEDURE — 97162 PT EVAL MOD COMPLEX 30 MIN: CPT

## 2018-02-20 PROCEDURE — 82330 ASSAY OF CALCIUM: CPT

## 2018-02-20 PROCEDURE — 84132 ASSAY OF SERUM POTASSIUM: CPT

## 2018-02-20 PROCEDURE — 85610 PROTHROMBIN TIME: CPT

## 2018-02-20 PROCEDURE — 84295 ASSAY OF SERUM SODIUM: CPT

## 2018-02-20 PROCEDURE — 83605 ASSAY OF LACTIC ACID: CPT

## 2018-02-20 PROCEDURE — 80053 COMPREHEN METABOLIC PANEL: CPT

## 2018-02-20 PROCEDURE — 82947 ASSAY GLUCOSE BLOOD QUANT: CPT

## 2018-02-20 PROCEDURE — 87798 DETECT AGENT NOS DNA AMP: CPT

## 2018-02-20 PROCEDURE — 83735 ASSAY OF MAGNESIUM: CPT

## 2018-02-20 PROCEDURE — 93306 TTE W/DOPPLER COMPLETE: CPT

## 2018-02-20 PROCEDURE — 87633 RESP VIRUS 12-25 TARGETS: CPT

## 2018-02-20 PROCEDURE — 71045 X-RAY EXAM CHEST 1 VIEW: CPT | Mod: 26

## 2018-02-20 PROCEDURE — 99285 EMERGENCY DEPT VISIT HI MDM: CPT | Mod: 25

## 2018-02-20 RX ORDER — FUROSEMIDE 40 MG
1 TABLET ORAL
Qty: 0 | Refills: 0 | COMMUNITY
Start: 2018-02-20

## 2018-02-20 RX ORDER — COLCHICINE 0.6 MG
1.2 TABLET ORAL ONCE
Qty: 0 | Refills: 0 | Status: COMPLETED | OUTPATIENT
Start: 2018-02-20 | End: 2018-02-20

## 2018-02-20 RX ORDER — METOPROLOL TARTRATE 50 MG
0.5 TABLET ORAL
Qty: 30 | Refills: 0 | OUTPATIENT
Start: 2018-02-20 | End: 2018-03-21

## 2018-02-20 RX ORDER — MULTIVIT-MIN/FERROUS GLUCONATE 9 MG/15 ML
1 LIQUID (ML) ORAL
Qty: 0 | Refills: 0 | COMMUNITY

## 2018-02-20 RX ORDER — FUROSEMIDE 40 MG
1 TABLET ORAL
Qty: 0 | Refills: 0 | COMMUNITY

## 2018-02-20 RX ORDER — FUROSEMIDE 40 MG
1 TABLET ORAL
Qty: 60 | Refills: 0 | OUTPATIENT
Start: 2018-02-20 | End: 2018-03-21

## 2018-02-20 RX ORDER — METOPROLOL TARTRATE 50 MG
0.5 TABLET ORAL
Qty: 0 | Refills: 0 | COMMUNITY

## 2018-02-20 RX ADMIN — HEPARIN SODIUM 5000 UNIT(S): 5000 INJECTION INTRAVENOUS; SUBCUTANEOUS at 13:40

## 2018-02-20 RX ADMIN — Medication 12.5 MILLIGRAM(S): at 06:38

## 2018-02-20 RX ADMIN — Medication 1.2 MILLIGRAM(S): at 12:47

## 2018-02-20 RX ADMIN — HEPARIN SODIUM 5000 UNIT(S): 5000 INJECTION INTRAVENOUS; SUBCUTANEOUS at 06:36

## 2018-02-20 RX ADMIN — Medication 1 TABLET(S): at 11:49

## 2018-02-20 RX ADMIN — Medication 40 MILLIGRAM(S): at 06:37

## 2018-02-20 RX ADMIN — Medication 81 MILLIGRAM(S): at 11:49

## 2018-02-20 NOTE — DISCHARGE NOTE ADULT - PLAN OF CARE
Stable Weigh yourself daily.  If you gain 3lbs in 3 days, or 5lbs in a week call your Health Care Provider.  Do not eat or drink foods containing more than 2000mg of salt (sodium) in your diet every day.  Call your Health Care Provider if you have any swelling or increased swelling in your feet, ankles, and/or stomach.  Take all of your medication as directed.  If you become dizzy call your Health Care Provider. Follow up with PMD in 1 week.   Continue present medication regimen.

## 2018-02-20 NOTE — DISCHARGE NOTE ADULT - HOSPITAL COURSE
89M with previous history of HFpEF with EF 42%, HTN, HLD, BPH, bladder CA s/p resection, CVA without residual deficits, severe presbycusis who presented with complaint of worsening SOB x several months admitted with acute on chronic heart failure exacerbation. Repeat TTE showed worsening systolic and diastolic function with EF 10% and severe pulm htn. followed by Cardiology (house). initially started monitor on telemetry  - monitor strict Is and Os and daily weights  - TTE with worsening systolic and diastolic function with EF 10% and severe pulm htn - d/w cardiology - no cath at this time.  c/w metoprolol 12.5mg bid, lasix, losartan  Monitor BP - may need to decrease cozaar to 25mg if BP does not tolerate  previous ct chest with pulm fibrosis - d/w Pulmonary no need for repeat CT chest at this time.   Creatinine stable.      Problem/Plan - 2:  ·  Problem: Delirium.  Plan: Patient with likely baseline mild cognitive impairment now with superimposed delirium/metabolic encephalopathy due to [place], new medications, hospital environment. Will put in enhanced observation room due to fall risk. Avoid agents that could exacerbate delirium. Re-orientation provided.      Problem/Plan - 3:  ·  Problem: HTN (hypertension).  Plan: c/w losartan.      Problem/Plan - 4:  ·  Problem: Hyperlipidemia.  Plan: - c/w Lipitor.      Problem/Plan - 5:  ·  Problem: BPH (benign prostatic hyperplasia).  Plan: - c/w Proscar.      Problem/Plan - 6:  Problem: CVA (cerebral vascular accident). Plan: - c/w ASA, statin.     Problem/Plan - 7:  ·  Problem: Advanced care planning/counseling discussion.  Plan: Patient remains full code.      Problem/Plan - 8:  ·  Problem: Need for prophylactic measure.  Plan: HSQ for DVT ppx  Regular, DASH/TLC diet  PT - no skilled needs.     Attending Attestation:   long discussion with son - Evan Storm (HCP) - Son wants to take patient home today. patient is high risk for readmission with EF 10% and underlying pulmonary fibrosis. discussed family meeting with palliative care at 12pm with son and he stated he feels he does not have anything else to discuss with Dr. Masterson. I also discussed fever last night and prefer to monitor patient for another 24 hours prior to discharge but son states he takes responsibility for patient feeling he would do better at home with risk for hospital delirium. Son understands that patient is high risk for readmission and may need to return to hospital if fever persists, symptoms of sob, chest pain, LE edema, cough develops. 89M with previous history of HFpEF with EF 42%, HTN, HLD, BPH, bladder CA s/p resection, CVA without residual deficits, severe presbycusis who presented with complaint of worsening SOB x several months admitted with acute on chronic heart failure exacerbation. Repeat TTE showed worsening systolic and diastolic function with EF 10% and severe pulm htn. followed by Cardiology (house). initially started on IV Lasix later changed to PO Lasix. 5 beats WCT on telemetry started on metoprolol bid. Patient developed mild delirium during hospitalization placed on enhanced supervision. Symptoms improved with reorientation. Patient developed low grade fever. UA negative, BCx sent, CXR without focal consolidation. No symptoms of cough, sore throat, sob, chest pain. No further inpatient workup or tests per cardiology or pulmonary.   long discussion with son - Evan Storm (HCP) - Son wants to take patient home today. Explained that patient is high risk for readmission with EF 10% and underlying pulmonary fibrosis and that patient had fever yesterday. Explained to son that though symptoms may be secondary to gout, prefer to monitor patient for another 24 hours prior to discharge for fever. Offered PT reevaluation but son declined stating he will hire aide if needed. Son states he will return patient to hospital if fever persists, symptoms of sob, chest pain, LE edema, cough develops. patient to follow up with cardiology, pulmonary, and primary care doctor within 1-2 weeks of discharge.

## 2018-02-20 NOTE — PROGRESS NOTE ADULT - ATTENDING COMMENTS
89 year old man with multiple medical problems. Short of breath due to combination of factors, but now improved and benefited from diuresis  Volume status improved. Has chronic reasonably compensated congestive heart failure due to combined systolic (severely reduced EF) and diastolic dysfunction. Family very much wants to take him home and discussed with Medicine attending this seems reasonable. There is no role for further cardiovascular procedures or therapies as they will not improve symptoms, functional status or long term prognosis.

## 2018-02-20 NOTE — PROGRESS NOTE ADULT - PROBLEM SELECTOR PLAN 8
HSQ for DVT ppx  Regular, DASH/TLC diet  Patient will need AMEE - on d/c
HSQ for DVT ppx  Regular, DASH/TLC diet  PT - no skilled needs.

## 2018-02-20 NOTE — DISCHARGE NOTE ADULT - MEDICATION SUMMARY - MEDICATIONS TO CHANGE
I will SWITCH the dose or number of times a day I take the medications listed below when I get home from the hospital:    Lasix 40 mg oral tablet  -- 1 tab(s) by mouth once a day in am and lasix 20mg po oral in pm

## 2018-02-20 NOTE — PROGRESS NOTE ADULT - PROVIDER SPECIALTY LIST ADULT
Cardiology
Hospitalist
Internal Medicine
Internal Medicine
Pulmonology
Hospitalist

## 2018-02-20 NOTE — PROGRESS NOTE ADULT - PROBLEM SELECTOR PROBLEM 4
Hyperlipidemia
Pulmonary hypertension
BPH (benign prostatic hyperplasia)
Hyperlipidemia
BPH (benign prostatic hyperplasia)

## 2018-02-20 NOTE — DISCHARGE NOTE ADULT - PATIENT PORTAL LINK FT
You can access the [facility] Patient Portal, offered by [facility], by registering with the following website: [email or web address]

## 2018-02-20 NOTE — PROGRESS NOTE ADULT - NSHPATTENDINGPLANDISCUSS_GEN_ALL_CORE
Medicine. To reach Cardiology Attending call during weekdays Spectra [phone].
to reach Cardiology Attending call during weekdays Spectra [phone].

## 2018-02-20 NOTE — PROGRESS NOTE ADULT - PROBLEM SELECTOR PROBLEM 2
Delirium
Acute decompensated heart failure
HTN (hypertension)
Delirium
HTN (hypertension)

## 2018-02-20 NOTE — PROGRESS NOTE ADULT - PROBLEM SELECTOR PROBLEM 7
Advanced care planning/counseling discussion
Advanced care planning/counseling discussion
Need for prophylactic measure

## 2018-02-20 NOTE — PROGRESS NOTE ADULT - PROBLEM SELECTOR PLAN 6
DVT proph  GI proph with regular consistency diet  Aspiration precautions  Maintain O2 sat > 88%   OOB/PT

## 2018-02-20 NOTE — PROGRESS NOTE ADULT - PROBLEM SELECTOR PLAN 1
-Mr. Storm has progressive dyspnea and SOB which is likely multifactorial - due to underlying heart and lung diseases, deconditioning, and poor breathing mechanics  -would continue PT/OOB

## 2018-02-20 NOTE — PROGRESS NOTE ADULT - PROBLEM SELECTOR PROBLEM 1
Dyspnea, unspecified type
Acute decompensated heart failure

## 2018-02-20 NOTE — PROGRESS NOTE ADULT - ASSESSMENT
89 M former 35 pack year smoker, with HTN, HLD, CVA, systolic and diastolic HF, pulm fibrosis, bladder CA s/p resection, BPH, admitted with progressively worsening shortness of breath and bilateral LE edema, found to have acute decompensated HF with an EF of 10 %, and acute pulm edema superimposed on pulm fibrosis
89 yo M w/PMH of HFpEF (stage II diastolic dysfunction), HTN, HLD, BPH, bladder CA s/p resection, CVA without residual deficits, severe presbycusis who presented with complaint of worsening SOB x several months admitted with acute on chronic heart failure exacerbation
89 yo M w/PMH of HFpEF (stage II diastolic dysfunction), HTN, HLD, BPH, bladder CA s/p resection, CVA without residual deficits, severe presbycusis who presented with complaint of worsening SOB x several months admitted with acute on chronic heart failure exacerbation - clinically improved with iv lasix
89 yo M w/PMH of HFpEF (stage II diastolic dysfunction), HTN, HLD, BPH, bladder CA s/p resection, CVA without residual deficits, severe presbycusis who presented with complaint of worsening SOB x several months admitted with acute on chronic heart failure exacerbation - clinically improved, euvolemic on lasix PO, will introduce BB. Patient with metabolic encephalopathy/delirium due to acute hospital environment.
89M, HFrEF (10%), HTN,HLD,bladder CA, CVA , pulmonary fibrosis, pw dyspnea.     ADHF-  improved volume status today.  Seems symptomatically improved.  - cont lasix 40 BID po  - cont losartan 50 QD  - would introduce low dose BB,  metoprolol tartrate 12.5 BID , switch to metoprolol succinate prior to discharge.   - check repeat proBNP prior to discharge.     Pulmonary Fibrosis- planned for outpt w/u per pulm.
89 yo M w/PMH of HFpEF (stage II diastolic dysfunction), HTN, HLD, BPH, bladder CA s/p resection, CVA without residual deficits, severe presbycusis who presented with complaint of worsening SOB x several months admitted with acute on chronic heart failure exacerbation - clinically improved with iv lasix - will change to PO today
A/P:   89 yo M w/PMH of systolic and diastolic heart failure, HTN, HLD, BPH, bladder CA s/p resection, CVA without residual deficits, severe presbycusis , pulmonary fibrosis ? who presented with complaint of worsening SOB x 3 weeks. Exam is notable for bilateral crackles and labs notable for elevated proBNP c/w pulmonary edema. EKG is noted for SR with 1st deg AV block and RBBB. TTE notable for segmental WMA and elevated PASP, likely either from underlying undiagnosed lung disease or left sided heart failure. This clinical presentation is likely consistent with ADHF on top of undiagnosed primary lung pathology.     - Pt has diuresed well with Lasix 40 IV overnight.     #ADHF:  - continue Lasix 40 IV BId  - goal net neg 1-1.5L   - BID BMP, Mg K> 4 Mg >2   - obtain pulmonary input based on fibrosis noted on CT scan in 11/2017 and the possible need for repeat chest CT     #weight loss - concerning in the setting of hx of bladder CA    Santiago Jha MD   Cardiology Fellow  x [phone]
89 yo M w/PMH of HFpEF (stage II diastolic dysfunction), HTN, HLD, BPH, bladder CA s/p resection, CVA without residual deficits, severe presbycusis who presented with complaint of worsening SOB x several months admitted with acute on chronic heart failure exacerbation
89 yo M w/PMH of HFpEF (stage II diastolic dysfunction), HTN, HLD, BPH, bladder CA s/p resection, CVA without residual deficits, severe presbycusis who presented with complaint of worsening SOB x several months admitted with acute on chronic heart failure exacerbation - clinically improved, euvolemic on lasix PO, will introduce BB. Patient with metabolic encephalopathy/delirium due to acute hospital environment.

## 2018-02-20 NOTE — PROGRESS NOTE ADULT - PROBLEM SELECTOR PLAN 7
Patient remains full code
HSQ for DVT ppx  Regular, DASH/TLC diet  PT consult - no skilled needs
HSQ for DVT ppx  Regular, DASH/TLC diet  Patient will need AMEE - on d/c
Patient remains full code
HSQ for DVT ppx  Regular, DASH/TLC diet  PT consult - no skilled needs

## 2018-02-20 NOTE — PROGRESS NOTE ADULT - SUBJECTIVE AND OBJECTIVE BOX
Pt doing well.  [place] of hearing.  However denies CP, or any other complaints.      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 - unsure which type  Neoplasm of bladder  BPH (benign prostatic hyperplasia)  HTN (hypertension)  Hyperlipidemia  CVA (cerebral infarction)  ASHD (arteriosclerotic heart disease)  S/P cataract surgery, right  S/P TURP (status post transurethral resection of prostate)  S/P tonsillectomy      Vitals:  T(F): 98.3 (02-20), Max: 100.9 (02-19)  HR: 90 (02-20) (88 - 93)  BP: 107/71 (02-20) (94/61 - 110/69)  RR: 18 (02-20)  SpO2: 94% (02-20)  I&O's Summary    19 Feb 2018 07:01  -  20 Feb 2018 07:00  --------------------------------------------------------  IN: 450 mL / OUT: 500 mL / NET: -50 mL        Physical Exam:  Appearance: No acute distress; well appearing; looks dyspneic at rest    Eyes: PERRL, EOMI, pink conjunctiva  HENT: Normal oral [place]  Cardiovascular: RRR, S1, S2, no murmurs, rubs, or gallops; no edema; + JVD  Respiratory: Diffuse crackles on exam; no rhonchi/rales   Gastrointestinal: soft, non-tender, non-distended with normal bowel sounds  Musculoskeletal: No clubbing; no joint deformity   Neurologic: Non-focal  Lymphatic: No lymphadenopathy  Psychiatry: AAOx3, mood & affect appropriate  Skin: No rashes, ecchymoses, or cyanosis                          13.4   5.7   )-----------( 218      ( 20 Feb 2018 05:16 )             39.6     02-20    135  |  97  |  27<H>  ----------------------------<  96  4.1   |  27  |  1.13    Ca    8.8      20 Feb 2018 05:16      Serum Pro-Brain Natriuretic Peptide: 6506 pg/mL (02-14 @ 19:47)    Interpretation of Telemetry:      Assessment     89 man w/ hx of HFrEF (EF now 10%), HTN, HLD, bladder CA, CVA , pulmonary fibrosis who presented w/ acute on chronic systolic heart failure.  Pt still hypervolemic w/ exam findings c/w fluid overload.     Plan  - switch back to IV lasix 40 mg BID   - monitor I/Os; uop  - cont losartan 50 mg QD   - increase metoprolol to 25 mg BID; hold off on switching to toprol XL until volume status improved     Josh Butler MD   [place] [facility] [phone] Pt doing well.  [place] of hearing.  However denies CP, or any other complaints.      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 - unsure which type  Neoplasm of bladder  BPH (benign prostatic hyperplasia)  HTN (hypertension)  Hyperlipidemia  CVA (cerebral infarction)  ASHD (arteriosclerotic heart disease)  S/P cataract surgery, right  S/P TURP (status post transurethral resection of prostate)  S/P tonsillectomy      Vitals:  T(F): 98.3 (02-20), Max: 100.9 (02-19)  HR: 90 (02-20) (88 - 93)  BP: 107/71 (02-20) (94/61 - 110/69)  RR: 18 (02-20)  SpO2: 94% (02-20)  I&O's Summary    19 Feb 2018 07:01  -  20 Feb 2018 07:00  --------------------------------------------------------  IN: 450 mL / OUT: 500 mL / NET: -50 mL        Physical Exam:  Appearance: No acute distress; well appearing; looks dyspneic at rest    Eyes: PERRL, EOMI, pink conjunctiva  HENT: Normal oral [place]  Cardiovascular: RRR, S1, S2, no murmurs, rubs, or gallops; no edema; + JVD  Respiratory: Diffuse crackles on exam; no rhonchi/rales   Gastrointestinal: soft, non-tender, non-distended with normal bowel sounds  Musculoskeletal: No clubbing; no joint deformity   Neurologic: Non-focal  Lymphatic: No lymphadenopathy  Psychiatry: AAOx3, mood & affect appropriate  Skin: No rashes, ecchymoses, or cyanosis                          13.4   5.7   )-----------( 218      ( 20 Feb 2018 05:16 )             39.6     02-20    135  |  97  |  27<H>  ----------------------------<  96  4.1   |  27  |  1.13    Ca    8.8      20 Feb 2018 05:16      Serum Pro-Brain Natriuretic Peptide: 6506 pg/mL (02-14 @ 19:47)    Interpretation of Telemetry:      Assessment     89 man w/ hx of HFrEF (EF now 10%), HTN, HLD, bladder CA, CVA , pulmonary fibrosis who presented w/ acute on chronic systolic heart failure.      Plan  - cont losartan 50 mg QD   - increase metoprolol to 25 mg BID; or switch to toprol XL    Josh Butler MD   [place] [facility] [id number]

## 2018-02-20 NOTE — PROGRESS NOTE ADULT - SUBJECTIVE AND OBJECTIVE BOX
Patient is a 89y old  Male who presents with a chief complaint of shortness of breath (15 Feb 2018 04:08)        SUBJECTIVE / OVERNIGHT EVENTS:      MEDICATIONS  (STANDING):  aspirin enteric coated 81 milliGRAM(s) Oral daily  atorvastatin 10 milliGRAM(s) Oral at bedtime  colchicine 1.2 milliGRAM(s) Oral once  finasteride 5 milliGRAM(s) Oral daily  furosemide    Tablet 40 milliGRAM(s) Oral two times a day  heparin  Injectable 5000 Unit(s) SubCutaneous every 8 hours  losartan 50 milliGRAM(s) Oral daily  metoprolol     tartrate 12.5 milliGRAM(s) Oral two times a day  multivitamin 1 Tablet(s) Oral daily    MEDICATIONS  (PRN):      Vital Signs Last 24 Hrs  T(C): 36.8 (2018 05:22), Max: 38.3 (2018 20:26)  T(F): 98.3 (2018 05:22), Max: 100.9 (2018 20:26)  HR: 90 (2018 06:35) (88 - 93)  BP: 107/71 (2018 06:35) (94/61 - 110/69)  BP(mean): --  RR: 18 (2018 05:22) (18 - 18)  SpO2: 94% (2018 05:22) (93% - 95%)  CAPILLARY BLOOD GLUCOSE        I&O's Summary    2018 07:  -  2018 07:00  --------------------------------------------------------  IN: 450 mL / OUT: 500 mL / NET: -50 mL    2018 07:  -  2018 11:38  --------------------------------------------------------  IN: 240 mL / OUT: 450 mL / NET: -210 mL        PHYSICAL EXAM:  GENERAL: NAD  HEAD:  Atraumatic, Normocephalic  EYES: conjunctiva and sclera clear  NECK: No JVD  CHEST/LUNG: CTA b/l  HEART: S1 S2 RRR  ABDOMEN: +BS Soft, NT/ND  EXTREMITIES:  2+ DP Pulses, No c/c/e  NEUROLOGY: AAOx3, no focal deficits   SKIN: No rashes or lesions    LABS:                        13.4   5.7   )-----------( 218      ( 2018 05:16 )             39.6     02-20    135  |  97  |  27<H>  ----------------------------<  96  4.1   |  27  |  1.13    Ca    8.8      2018 05:16            Urinalysis Basic - ( 2018 00:40 )    Color: Yellow / Appearance: Clear / S.015 / pH: x  Gluc: x / Ketone: Negative  / Bili: Negative / Urobili: 4 mg/dL   Blood: x / Protein: Trace / Nitrite: Negative   Leuk Esterase: Negative / RBC: x / WBC x   Sq Epi: x / Non Sq Epi: x / Bacteria: x        RADIOLOGY & ADDITIONAL TESTS:    Imaging Personally Reviewed:  Consultant(s) Notes Reviewed:    Care Discussed with Consultants/Other Providers: Patient is a 89y old  Male who presents with a chief complaint of shortness of breath (15 Feb 2018 04:08)        SUBJECTIVE / OVERNIGHT EVENTS: no acute complaints      MEDICATIONS  (STANDING):  aspirin enteric coated 81 milliGRAM(s) Oral daily  atorvastatin 10 milliGRAM(s) Oral at bedtime  colchicine 1.2 milliGRAM(s) Oral once  finasteride 5 milliGRAM(s) Oral daily  furosemide    Tablet 40 milliGRAM(s) Oral two times a day  heparin  Injectable 5000 Unit(s) SubCutaneous every 8 hours  losartan 50 milliGRAM(s) Oral daily  metoprolol     tartrate 12.5 milliGRAM(s) Oral two times a day  multivitamin 1 Tablet(s) Oral daily    MEDICATIONS  (PRN):      Vital Signs Last 24 Hrs  T(C): 36.8 (2018 05:22), Max: 38.3 (2018 20:26)  T(F): 98.3 (2018 05:22), Max: 100.9 (2018 20:26)  HR: 90 (2018 06:35) (88 - 93)  BP: 107/71 (2018 06:35) (94/61 - 110/69)  BP(mean): --  RR: 18 (2018 05:22) (18 - 18)  SpO2: 94% (2018 05:22) (93% - 95%)  CAPILLARY BLOOD GLUCOSE        I&O's Summary    2018 07:  -  2018 07:00  --------------------------------------------------------  IN: 450 mL / OUT: 500 mL / NET: -50 mL    2018 07:01  -  2018 11:38  --------------------------------------------------------  IN: 240 mL / OUT: 450 mL / NET: -210 mL        PHYSICAL EXAM:  GENERAL: NAD  HEAD:  Atraumatic, Normocephalic  EYES: conjunctiva and sclera clear  NECK: No JVD  CHEST/LUNG: CTA b/l  HEART: S1 S2 RRR  ABDOMEN: +BS Soft, NT/ND  EXTREMITIES:  2+ DP Pulses, No c/c/e  NEUROLOGY: AAOx3, no focal deficits   SKIN: No rashes or lesions    LABS:                        13.4   5.7   )-----------( 218      ( 2018 05:16 )             39.6     02-20    135  |  97  |  27<H>  ----------------------------<  96  4.1   |  27  |  1.13    Ca    8.8      2018 05:16            Urinalysis Basic - ( 2018 00:40 )    Color: Yellow / Appearance: Clear / S.015 / pH: x  Gluc: x / Ketone: Negative  / Bili: Negative / Urobili: 4 mg/dL   Blood: x / Protein: Trace / Nitrite: Negative   Leuk Esterase: Negative / RBC: x / WBC x   Sq Epi: x / Non Sq Epi: x / Bacteria: x        RADIOLOGY & ADDITIONAL TESTS:    Imaging Personally Reviewed:  Consultant(s) Notes Reviewed: Cards   Care Discussed with Consultants/Other Providers: d/w Dr. Masterson (palliative) regarding plan of care Patient is a 89y old  Male who presents with a chief complaint of shortness of breath (15 Feb 2018 04:08)        SUBJECTIVE / OVERNIGHT EVENTS: no acute complaints      MEDICATIONS  (STANDING):  aspirin enteric coated 81 milliGRAM(s) Oral daily  atorvastatin 10 milliGRAM(s) Oral at bedtime  colchicine 1.2 milliGRAM(s) Oral once  finasteride 5 milliGRAM(s) Oral daily  furosemide    Tablet 40 milliGRAM(s) Oral two times a day  heparin  Injectable 5000 Unit(s) SubCutaneous every 8 hours  losartan 50 milliGRAM(s) Oral daily  metoprolol     tartrate 12.5 milliGRAM(s) Oral two times a day  multivitamin 1 Tablet(s) Oral daily    MEDICATIONS  (PRN):      Vital Signs Last 24 Hrs  T(C): 36.8 (2018 05:22), Max: 38.3 (2018 20:26)  T(F): 98.3 (2018 05:22), Max: 100.9 (2018 20:26)  HR: 90 (2018 06:35) (88 - 93)  BP: 107/71 (2018 06:35) (94/61 - 110/69)  BP(mean): --  RR: 18 (2018 05:22) (18 - 18)  SpO2: 94% (2018 05:22) (93% - 95%)  CAPILLARY BLOOD GLUCOSE        I&O's Summary    2018 07:  -  2018 07:00  --------------------------------------------------------  IN: 450 mL / OUT: 500 mL / NET: -50 mL    2018 07:01  -  2018 11:38  --------------------------------------------------------  IN: 240 mL / OUT: 450 mL / NET: -210 mL        PHYSICAL EXAM:  GENERAL: NAD  HEAD:  Atraumatic, Normocephalic  EYES: conjunctiva and sclera clear  NECK: No JVD  CHEST/LUNG: b/l crackles midway up lung  HEART: S1 S2 RRR  ABDOMEN: +BS Soft, NT/ND  EXTREMITIES:  2+ DP Pulses, No c/c. no LE edema. Right toe redness.   NEUROLOGY: AAOx3, no focal deficits   SKIN: No rashes or lesions    LABS:                        13.4   5.7   )-----------( 218      ( 2018 05:16 )             39.6     02-    135  |  97  |  27<H>  ----------------------------<  96  4.1   |  27  |  1.13    Ca    8.8      2018 05:16            Urinalysis Basic - ( 2018 00:40 )    Color: Yellow / Appearance: Clear / S.015 / pH: x  Gluc: x / Ketone: Negative  / Bili: Negative / Urobili: 4 mg/dL   Blood: x / Protein: Trace / Nitrite: Negative   Leuk Esterase: Negative / RBC: x / WBC x   Sq Epi: x / Non Sq Epi: x / Bacteria: x        RADIOLOGY & ADDITIONAL TESTS:    Imaging Personally Reviewed:  Consultant(s) Notes Reviewed: Cards   Care Discussed with Consultants/Other Providers: d/w Dr. Masterson (palliative) regarding plan of care

## 2018-02-20 NOTE — PROGRESS NOTE ADULT - PROBLEM SELECTOR PROBLEM 5
BPH (benign prostatic hyperplasia)
Neoplasm of bladder
CVA (cerebral vascular accident)
BPH (benign prostatic hyperplasia)
CVA (cerebral vascular accident)

## 2018-02-20 NOTE — PROGRESS NOTE ADULT - PROBLEM SELECTOR PLAN 3
c/w losartan
-patient has underlying interstitial lung disease noted on CT scan in 11/2017  -unfortunately there are no great long term therapies for ILD at this time and given his underlying cardiac dysfunction small changes in his volume status/pulmonary edema will cause significant worsening of his respiratory status  -maintain O2 sat > 88% with supplemental O2 as needed  -no role for open lung biopsy at this time  -in my estimation patient would most benefit from measures aimed at his comfort and symptom relief. However, if patient/family wish for more workup for his ILD then he will need outpatient pulmonary followup for testing such as PFTs, 6 minute walk test, serologic analysis and then based on that discussion about the risk/benefit of open lung biopsy - which may not be feasible given his severe cardiac dysfunction
- c/w Lipitor
c/w losartan
- c/w Lipitor

## 2018-02-20 NOTE — PROGRESS NOTE ADULT - PROBLEM SELECTOR PLAN 2
Patient with likely baseline mild cognitive impairment now with superimposed delirium/metabolic encephalopathy due to [place], new medications, hospital environment. Will put in enhanced observation room due to fall risk. Avoid agents that could exacerbate delirium. Re-orientation provided.
-continue diuretics with goal to keep net negative   -monitor I/O  -has had significant improvement in respiratory status since admission  -does have some bibasilar crackles on exam today  -GOC discussion with family
c/w losartan  all medications clarified yesturday with outpatient pharmacy
Patient with likely baseline mild cognitive impairment now with superimposed delirium/metabolic encephalopathy due to [place], new medications, hospital environment. Will put in enhanced observation room due to fall risk. Avoid agents that could exacerbate delirium. Re-orientation provided.
c/w losartan  all medications clarified with outpatient pharmacy
c/w losartan  all medications clarified yesterday with outpatient pharmacy
Per med rec on previous admission and in [employer], patient is taking Losartan 50 at home  - will continue home med with hold parameters, will need clarification in the AM with family or pharmacy

## 2018-02-20 NOTE — DISCHARGE NOTE ADULT - CARE PLAN
Principal Discharge DX:	Acute decompensated heart failure  Goal:	Stable  Assessment and plan of treatment:	Weigh yourself daily.  If you gain 3lbs in 3 days, or 5lbs in a week call your Health Care Provider.  Do not eat or drink foods containing more than 2000mg of salt (sodium) in your diet every day.  Call your Health Care Provider if you have any swelling or increased swelling in your feet, ankles, and/or stomach.  Take all of your medication as directed.  If you become dizzy call your Health Care Provider.  Secondary Diagnosis:	Pulmonary fibrosis  Goal:	Stable  Assessment and plan of treatment:	Follow up with PMD in 1 week.   Continue present medication regimen.

## 2018-02-20 NOTE — PROGRESS NOTE ADULT - ATTENDING COMMENTS
In my estimation patient would most benefit from measures aimed at his comfort and symptom relief. There is not much that can be done with regards to his underlying ILD. His symptoms will be magnified given his concurrent cardiac dysfunction.

## 2018-02-20 NOTE — PROGRESS NOTE ADULT - ATTENDING COMMENTS
long discussion with son - Evan Storm (HCP) - Son wants to take patient home today. patient is high risk for readmission with EF 10% and underlying pulmonary fibrosis. discussed family meeting with palliative care at 12pm with son and he stated he feels he does not have anything else to discuss with Dr. Masterson. I also discussed fever last night and prefer to monitor patient for another 24 hours prior to discharge but son states he takes responsibility for patient feeling he would do better at home with risk for hospital delirium. Son understands that patient is high risk for readmission and may need to return to hospital if fever persists, symptoms of sob, chest pain, LE edema, cough develops. long discussion with son - Evan Storm (HCP) - Son wants to take patient home today. Explained that patient is high risk for readmission with EF 10% and underlying pulmonary fibrosis and that patient had fever yesterday. Explained to son that though symptoms may be secondary to gout, prefer to monitor patient for another 24 hours prior to discharge for fever. Family meeting with son, Evan Storm (HCP), Dr. Masterson and myself. Face to face time 40 minutes – son understands risks of readmission and takes responsibility for care of patient. Offered PT reevaluation but son declined stating he will hire aide if needed. Son states he will return patient to hospital if fever persists, symptoms of sob, chest pain, LE edema, cough develops.    discharge time 55 minutes

## 2018-02-20 NOTE — PROGRESS NOTE ADULT - PROBLEM SELECTOR PLAN 4
- c/w Lipitor
-likely due to his underlying heart and lung disease (Group II/III)  -no role for vasodilators at this time
- c/w Proscar
- c/w Lipitor
- c/w Proscar

## 2018-02-20 NOTE — PROGRESS NOTE ADULT - PROBLEM SELECTOR PLAN 1
both systolic and diastolic dysfunction on prior TTE.   lasix to 40mg PO bid - overall improved  monitor on telemetry  - monitor strict Is and Os and daily weights  - TTE with worsening systolic and diastolic function with EF 10% and severe pulm htn - d/w cardiology - no cath at this time.  c/w metoprolol 12.5mg bid, lasix, losartan  Monitor BP - may need to decrease cozaar to 25mg if BP does not tolerate  previous ct chest with pulm fibrosis - d/w Pulmonary no need for repeat CT chest at this time.   Creatinine stable

## 2018-02-20 NOTE — PROGRESS NOTE ADULT - PROBLEM SELECTOR PROBLEM 3
HTN (hypertension)
Pulmonary fibrosis
Hyperlipidemia
HTN (hypertension)
Hyperlipidemia

## 2018-02-20 NOTE — PROGRESS NOTE ADULT - SUBJECTIVE AND OBJECTIVE BOX
[facility] DIVISION OF PULMONARY, CRITICAL CARE and SLEEP MEDICINE  PULMONARY PROGRESS NOTE  FOR ANY QUESTIONS PLEASE CALL [phone] MONDAY - FRIDAY 8a-5p or [phone] on NIGHTS/WEEKENDS/HOLIDAYS    PATIENT INFORMATION:  NAME: CONSTANTINO JO:  MRN: [id number]    CHIEF COMPLAINT: Patient is a 89y old  Male who presents with a chief complaint of shortness of breath (15 Feb 2018 04:08)      [x] INITIAL CONSULT, H&P, FAMILY HISTORY and PAST MEDICAL AND SURGICAL HISTORY REVIEWED    OVERNIGHT EVENTS or CHANGES TO HPI: No overnight complaints. Patient in good spirits.   Sleeping in reclined position.   Fever 100.9 last night    ========================REVIEW OF SYSTEMS========================  CONSTITUTIONAL: No complaints "overall doing good"  CARDIOVASCULAR: Denies chest pain or palpitations - but does have sensation of "gas pain"  PULMONARY: Denies cough, persistent PRITCHARD  [x] REMAINING REVIEW OF SYSTEMS NEGATIVE  [] UNABLE TO OBTAIN REVIEW OF SYSTEMS DUE TO _______________    ========================MEDICATIONS=============================  MEDICATIONS  (STANDING):  aspirin enteric coated 81 milliGRAM(s) Oral daily  atorvastatin 10 milliGRAM(s) Oral at bedtime  finasteride 5 milliGRAM(s) Oral daily  furosemide    Tablet 40 milliGRAM(s) Oral two times a day  heparin  Injectable 5000 Unit(s) SubCutaneous every 8 hours  losartan 50 milliGRAM(s) Oral daily  metoprolol     tartrate 12.5 milliGRAM(s) Oral two times a day  multivitamin 1 Tablet(s) Oral daily    MEDICATIONS  (PRN):      ========================PHYSICAL EXAM============================    VITALS: ICU Vital Signs Last 24 Hrs  T(C): 36.8 (20 Feb 2018 05:22), Max: 38.3 (19 Feb 2018 20:26)  T(F): 98.3 (20 Feb 2018 05:22), Max: 100.9 (19 Feb 2018 20:26)  HR: 90 (20 Feb 2018 06:35) (88 - 93)  BP: 107/71 (20 Feb 2018 06:35) (94/61 - 110/69)  BP(mean): --  ABP: --  ABP(mean): --  RR: 18 (20 Feb 2018 05:22) (18 - 18)  SpO2: 94% (20 Feb 2018 05:22) (93% - 95%)      INTAKE and OUTPUT: I&O's Summary    19 Feb 2018 07:01  -  20 Feb 2018 07:00  --------------------------------------------------------  IN: 450 mL / OUT: 500 mL / NET: -50 mL    VENTILATOR SETTINGS: N/A    GENERAL: awake, alert, oriented, NAD, hard of hearing  EYES: anicteric, EOMI  EAR/NOSE/MOUTH/THROAT: normocephalic, atraumatic, MMM, nares clear, trachea midline  NECK: supple, no JVD  LYMPH NODES: no palpable supraclavicular LAD   CARDIOVASCULAR: RRR, S1S2  RESPIRATORY: bibasilar crackles, good inspiratory effort, no wheeze  ABDOMEN: soft, NT, ND, +BS  EXTREMITIES: no clubbing or cyanosis  SKIN: warm and dry  MUSCULOSKELETAL: mildly diminished strength  NEUROLOGIC: moves all extremities, nonfocal exam  PSYCHIATRIC: calm and in good spirits    ========================LABORATORY RESULTS AND IMAGING=============                        13.4   5.7   )-----------( 218      ( 20 Feb 2018 05:16 )             39.6                                                    02-20    135  |  97  |  27<H>  ----------------------------<  96  4.1   |  27  |  1.13    Ca    8.8      20 Feb 2018 05:16      Creatinine Trend: 1.13<--, 1.06<--, 1.11<--, 1.06<--, 0.94<--    CT CHEST: < from: CT Chest No Cont (11.23.17 @ 08:42) >  FINDINGS:    CHEST:     LUNGS AND LARGE AIRWAYS: No central endobronchial lesion. There are diffuse ground glass opacities, interlobular septal wall thickening, and   subpleural reticular opacities with associated traction bronchiectasis, consistent with pulmonary fibrosis. There are denser airspace opacities   in the bilateral upper and lower lobes, consistent with superimposed pulmonary edema.  PLEURA: Small bilateral pleural effusions.  VESSELS:Atherosclerotic calcifications.  HEART: Heart size is enlarged. Aortic valve and coronary artery calcifications. No pericardial effusion.  MEDIASTINUM AND JUAN FRANCISCO: Prominent mediastinal lymph nodes. A reference prevascular lymph node measures 2.8 x 1.8 cm (series 2, image 44) and a   right lower paratracheal lymph node measures 1.9 x 2.3 cm (series 2, image 46). These are likely reactive in etiology.  CHEST WALL AND LOWER NECK: Within normal limits.  VISUALIZED UPPER ABDOMEN: Right hepatic lobe cyst. Cholelithiasis with a fluid-filled gallbladder. Bilateral renal cysts.  BONES: Degenerative changes.    IMPRESSION:   Pulmonary fibrosis with superimposed pulmonary edema.  Small bilateral pleural effusions.  < end of copied text >      [x] RADIOLOGY REVIEWED AND INTERPRETED BY ME      THANK YOU FOR ALLOWING US TO PARTICIPATE IN THE CARE OF THIS PATIENT

## 2018-03-14 ENCOUNTER — RX RENEWAL (OUTPATIENT)
Age: 83
End: 2018-03-14

## 2018-04-06 ENCOUNTER — NON-APPOINTMENT (OUTPATIENT)
Age: 83
End: 2018-04-06

## 2018-04-06 ENCOUNTER — APPOINTMENT (OUTPATIENT)
Dept: CARDIOTHORACIC SURGERY | Facility: CLINIC | Age: 83
End: 2018-04-06
Payer: MEDICARE

## 2018-04-06 VITALS
RESPIRATION RATE: 18 BRPM | DIASTOLIC BLOOD PRESSURE: 85 MMHG | HEART RATE: 95 BPM | TEMPERATURE: 97.2 F | WEIGHT: 163 LBS | OXYGEN SATURATION: 95 % | BODY MASS INDEX: 27.16 KG/M2 | SYSTOLIC BLOOD PRESSURE: 124 MMHG | HEIGHT: 65 IN

## 2018-04-06 DIAGNOSIS — I50.23 ACUTE ON CHRONIC SYSTOLIC (CONGESTIVE) HEART FAILURE: ICD-10-CM

## 2018-04-06 PROCEDURE — 99205 OFFICE O/P NEW HI 60 MIN: CPT | Mod: 25

## 2018-04-06 PROCEDURE — 93000 ELECTROCARDIOGRAM COMPLETE: CPT

## 2018-04-06 RX ORDER — POTASSIUM CHLORIDE 750 MG/1
10 CAPSULE, EXTENDED RELEASE ORAL
Qty: 30 | Refills: 0 | Status: DISCONTINUED | COMMUNITY
Start: 2018-01-12 | End: 2018-04-06

## 2018-04-06 RX ORDER — ATORVASTATIN CALCIUM 10 MG/1
10 TABLET, FILM COATED ORAL DAILY
Refills: 0 | Status: ACTIVE | COMMUNITY

## 2018-04-06 RX ORDER — FUROSEMIDE 40 MG/1
40 TABLET ORAL
Qty: 15 | Refills: 0 | Status: DISCONTINUED | COMMUNITY
Start: 2018-01-11 | End: 2018-04-06

## 2018-05-10 ENCOUNTER — RX RENEWAL (OUTPATIENT)
Age: 83
End: 2018-05-10

## 2018-06-17 LAB
ALBUMIN SERPL ELPH-MCNC: 3.7 G/DL
ALP BLD-CCNC: 98 U/L
ALT SERPL-CCNC: 24 U/L
ANION GAP SERPL CALC-SCNC: 13 MMOL/L
AST SERPL-CCNC: 27 U/L
BILIRUB SERPL-MCNC: 0.5 MG/DL
BUN SERPL-MCNC: 27 MG/DL
CALCIUM SERPL-MCNC: 9.6 MG/DL
CHLORIDE SERPL-SCNC: 112 MMOL/L
CO2 SERPL-SCNC: 21 MMOL/L
CREAT SERPL-MCNC: 1.07 MG/DL
GLUCOSE SERPL-MCNC: 90 MG/DL
NT-PROBNP SERPL-MCNC: 6522 PG/ML
POTASSIUM SERPL-SCNC: 4.3 MMOL/L
PROT SERPL-MCNC: 7.1 G/DL
SODIUM SERPL-SCNC: 146 MMOL/L

## 2018-07-26 NOTE — H&P ADULT - PROBLEM SELECTOR PROBLEM 4
Discussed condition and exacerbating conditions/situations (e.g., dry/arid environments, overhead fans, air conditioners, side effect of medications). BPH (benign prostatic hyperplasia)

## 2018-08-14 RX ORDER — METOPROLOL TARTRATE 25 MG/1
25 TABLET, FILM COATED ORAL
Qty: 30 | Refills: 3 | Status: ACTIVE | COMMUNITY
Start: 1900-01-01 | End: 1900-01-01

## 2018-08-14 RX ORDER — FUROSEMIDE 40 MG/1
40 TABLET ORAL TWICE DAILY
Qty: 60 | Refills: 3 | Status: ACTIVE | COMMUNITY
Start: 2018-04-06 | End: 1900-01-01

## 2018-09-03 PROBLEM — R60.0 BILATERAL EDEMA OF LOWER EXTREMITY: Status: ACTIVE | Noted: 2018-01-11

## 2018-09-07 NOTE — ED ADULT NURSE REASSESSMENT NOTE - NS ED NURSE REASSESS COMMENT FT1
[facility] Emergency [facility]  DISCHARGE SUMMARY       Date of Admission: 9/3/2018  Date of Discharge:  9/7/2018  Primary Care Physician: Provider, No Known    Presenting Problem/History of Present Illness:  Fecal impaction (CMS/HCC) [K56.41]  Hydroureter, left [N13.4]  Abdominal pain, unspecified abdominal location [R10.9]  Urinary tract infection without hematuria, site unspecified [N39.0]     Final Discharge Diagnoses:  Principal Problem:    Fecal impaction (CMS/HCC)  Active Problems:    Constipation    ESRD (end stage renal disease) on dialysis (CMS/HCC)    Abdominal pain    Anemia in chronic kidney disease    Hydroureter on left    Hydronephrosis due to obstruction of ureter      Consults:   Consults     Date and Time Order Name Status Description    9/5/2018 0929 Inpatient Gastroenterology Consult Completed     9/3/2018 1927 Inpatient Nephrology Consult          Pertinent Test Results:   Ct Abdomen Pelvis Without Contrast    Result Date: 9/3/2018  CT Abdomen and Pelvis Without Contrast History: Bilateral flank pain. Axials spiral scans of the abdomen and pelvis were obtained without contrast.  Coronal and sagital reconstructions were preformed.  This exam was performed according to our departmental dose-optimization program, which includes automated exposure control, adjustment of the mA and/or kV according to patient size and/or use of iterative reconstruction technique. DLP: 308.40 Comparison: August 22, 2013 Linear atelectasis or scar lung bases. Old granulomatous disease. Coronary artery calcifications. No acute osseous abnormality. Degenerative changes and degenerative disc disease in the lumbar spine. Vertebroplasty T12. Old moderate compression deformity L1 vertebral body. Hepatic and splenic granulomata. Cholecystectomy. The pancreas is unremarkable. The adrenal glands are unremarkable. Unremarkable bladder. No pelvic mass. No abdominal aortic aneurysm. No  adenopathy. Fecal impaction with large amount retained feces throughout the colon. Appendix not identified. No pericecal inflammatory changes. No free air. No free fluid. 6 cm hiatal hernia. Subcutaneous edema or anasarca. No renal or ureteral calculi. Stable 5 mm mass arising posteriorly from the lower pole of the left kidney. Atrophic kidneys. Minimal to moderate left hydronephrosis and hydroureter without ureteral calculus identified. There may be compression of the left ureter by the feces distended rectum.     Conclusion: Fecal impaction with large amount retained feces throughout the colon. 6 cm hiatal hernia. Subcutaneous edema or anasarca. Atrophic kidneys. Minimal to moderate left hydronephrosis and hydroureter without ureteral calculus identified. There may be compression of the left ureter by the feces distended rectum. Cholecystectomy. Coronary artery calcifications. [id number] Electronically signed by:  Torito Bautista MD  9/3/2018 5:45 PM CDT Workstation: [id number] Abdomen Flat & Upright    Result Date: 9/5/2018  [facility] Patient Name: SIRISHA GUPTA ORDERING: DB HOLLIDAY ATTENDING: MARÍA ELENA CULLEN REFERRING: DB HOLLIDAY ----------------------- PROCEDURE: Two-view abdomen COMPARISON: CT abdomen and pelvis 9/3/2018 HISTORY: recheck, R10.9 Unspecified abdominal pain K56.41 Fecal impaction N39.0 Urinary tract infection, site not specified N13.4 Hydroureter TECHNIQUE: Upright and supine views of the abdomen are obtained. FINDINGS: Large amount of gas and stool noted throughout the colon. Distended stool-filled rectum. Nonspecific small bowel gas pattern. No suspicious calcifications. No evidence of free air. Bone cement noted in the lower thoracic spine. Atherosclerotic calcification is present in the abdominal aorta.     CONCLUSION:  Large amount of gas and stool noted throughout the colon. Distended stool-filled rectum. Nonspecific small bowel gas pattern. Electronically  pt. attempted to urinate but is having difficulty. MD Alba aware. Pt. bladder scanned and has over 850 mL as per bladder scan. MD Alba contacting urology now. signed by:  Amador Winters MD  9/5/2018 9:39 AM CDT Workstation: [id number]    Us Renal Bilateral    Result Date: 9/7/2018  EXAMINATION: Ultrasound, retroperitoneal / renal CLINICAL INDICATION / HISTORY: hydronephrosis follow-up, R10.9 Unspecified abdominal pain K56.41 Fecal impaction N39.0 Urinary tract infection, site not specified N13.4 Hydroureter COMPARISON:  None TECHNIQUE:  Grayscale and color Doppler ultrasound. FINDINGS: The right kidney is atrophic measuring 5.2 cm in length. The left kidney is not visualized. There is no hydronephrosis in the right kidney. The cortex of the right kidney appears echogenic compatible with renal parenchymal disease. The urinary bladder contains a small amount of urine and appears unremarkable.      CONCLUSION: The left kidney is not visualized. The right kidney is atrophic measuring 5.2 cm in length. There is no hydronephrosis in the right kidney. The cortex of the right kidney appears echogenic compatible with renal parenchymal disease. Electronically signed by:  Amador Winters MD  9/7/2018 12:38 PM CDT Workstation: [id number]    Xr Abdomen Kub    Result Date: 9/7/2018  [facility] Patient Name: SIRISHA GUPTA ORDERING: DEIRDRE SKINNER ATTENDING: MARÍA ELENA CULLEN REFERRING: DEIRDRE SKINNER ----------------------- PROCEDURE: Single view abdomen/KUB COMPARISON: 9/5/18, 9/4/18 HISTORY: abdominal pain, impaction, R10.9 Unspecified abdominal pain K56.41 Fecal impaction N39.0 Urinary tract infection, site not specified N13.4 Hydroureter TECHNIQUE: Single frontal view of the abdomen and pelvis. FINDINGS: Stool burden decreased from prior exam. There is fecal debris and bowel gas in nondilated colon. Nonobstructive small bowel gas pattern. No suspicious calcifications.     CONCLUSION:  Unremarkable exam. Stool burden decreased from prior exam. Electronically signed by:  Amador Winters MD  9/7/2018 11:04 AM CDT Workstation: [id number]    Xr Abdomen Kub    Result Date:  9/5/2018  PROCEDURE: XR ABDOMEN KUB VIEWS:  1 INDICATION:Abdominal pain/discomfort COMPARISON: None FINDINGS:   - Bowel gas pattern: Nonobstructive. Increased stool in ascending colon and rectosigmoid   - Free air: None   - Soft tissue:No gross evidence of organomegaly,     an abdominal mass,or ascites   - Calculi:None projecting over gallbladder      fossa, renal shadows, or anticipated course of the ureters.   - Osseous: Kyphoplasty cement is seen at the thoracolumbar junction.     Constipation. Nonobstructive bowel gas pattern. Please note that gas and stool limits evaluation for nephrolithiasis If pain or symptoms persist beyond reasonable expectations, a CT examination is suggested, as is deemed clinically appropriate. Electronically signed by:  Katey Macias MD  9/5/2018 10:10 PM CDT Workstation: [id number]      HPI/Hospital Course:  The patient is a 73 y.o. female with a history of ESRD on HD, CAD, and DMII who presented to [facility] with abdominal pain.  CT revealed fecal impaction and hydronephrosis/hydroureter without calculus and potentially secondary to compression from distended rectum.  The patient was admitted and initiated on stool softeners, osmotic laxatives, and enemas.  Disimpaction was ordered. The patient was reported to have had several liquid bowel movements.  Repeat KUB revealed persistent impaction.  She underwent manual disimpaction and received SMOG enema and had a large bowel movement.  KUB today revealed resolved impaction and was unremarkable.  Renal ultrasound was not able to visualize the left kidney, however, the patient's pain had resolved.  She was evaluated by gastroenterology as she had a drop in Hgb and occult blood was positive.  Hgb remained stable and ultimately increased.  She was cleared by gastroenterology for discharge.  She is stable and discharged to SNF.    Condition on Discharge:  Stable    Physical Exam on Discharge:  /65 (BP Location: Left arm,  "Patient Position: Lying)   Pulse 69   Temp 97.2 °F (36.2 °C) (Oral)   Resp 18   Ht 157.5 cm (62\")   Wt 51 kg (112 lb 6.4 oz)   SpO2 97%   BMI 20.56 kg/m²   Physical Exam   Constitutional: She appears well-developed and well-nourished. No distress.   HENT:   Head: Normocephalic.   Eyes: Conjunctivae are normal.   Cardiovascular: Normal rate, regular rhythm, normal heart sounds and intact distal pulses.    Pulmonary/Chest: Effort normal and breath sounds normal. No respiratory distress.   Abdominal: Soft. Bowel sounds are normal. She exhibits no distension. There is no tenderness.   Musculoskeletal: Normal range of motion. She exhibits no edema or deformity.   Neurological: She is alert.   Skin: Skin is warm and dry. She is not diaphoretic. No erythema.   Vitals reviewed.        Discharge Disposition:  Skilled Nursing Facility (DC - External)    Discharge Medications:     Discharge Medications      Changes to Medications      Instructions Start Date   HYDROcodone-acetaminophen 7.5-325 MG per tablet  Commonly known as:  NORCO  What changed:  · when to take this  · reasons to take this   1 tablet, Oral, Every 4 Hours PRN         Continue These Medications      Instructions Start Date   acetaminophen 325 MG tablet  Commonly known as:  TYLENOL   650 mg, Oral, Every 4 Hours PRN      APRISO 0.375 g 24 hr capsule  Generic drug:  mesalamine   1 capsule, Oral, Daily      aspirin 81 MG EC tablet   81 mg, Oral, Daily      atorvastatin 40 MG tablet  Commonly known as:  LIPITOR   40 mg, Oral, Nightly      Camphor-Menthol-Methyl Sal 4-10-30 % cream   1 application, Apply externally, Every 4 Hours PRN      COLACE 100 MG capsule  Generic drug:  docusate sodium   1 capsule, Oral, 2 Times Daily      famotidine 20 MG tablet  Commonly known as:  PEPCID   20 mg, Oral, Every Night at Bedtime      furosemide 40 MG tablet  Commonly known as:  LASIX   40 mg, Oral, Every Morning      gabapentin 100 MG capsule  Commonly known as:  " NEURONTIN   100 mg, Oral, 3 Times Daily      hydrALAZINE 100 MG tablet  Commonly known as:  APRESOLINE   100 mg, Oral, 3 Times Daily      levETIRAcetam 500 MG tablet  Commonly known as:  KEPPRA   500 mg, Oral, 2 Times Daily      levothyroxine 88 MCG tablet  Commonly known as:  SYNTHROID, LEVOTHROID   88 mcg, Oral, Daily      mirtazapine 7.5 MG half tablet  Commonly known as:  REMERON   7.5 mg, Oral, Nightly      nitroglycerin 0.4 MG SL tablet  Commonly known as:  NITROSTAT   0.4 mg, Sublingual, Every 5 Minutes PRN, Take no more than 3 doses in 15 minutes.       polyethylene glycol packet  Commonly known as:  MIRALAX   17 g, Oral, Daily PRN      PREPARATION H 1-0.25-14.4-15 % cream  Generic drug:  Pramox-PE-Glycerin-Petrolatum   1 application, Rectal, Every 4 Hours PRN      QUEtiapine 25 MG tablet  Commonly known as:  SEROquel   25 mg, Oral, Nightly      NGA CAPS PO   1 capsule, Oral, Daily      sevelamer 800 MG tablet  Commonly known as:  RENVELA   1,600 mg, Oral, 3 Times Daily With Meals      vitamin B-6 50 MG tablet  Commonly known as:  PYRIDOXINE   50 mg, Oral, Daily      vitamin D 66901 units capsule capsule  Commonly known as:  ERGOCALCIFEROL   50,000 Units, Oral, Every 14 Days             Discharge Diet:   Diet Instructions     Diet: Regular, Cardiac, Consistent Carbohydrate, Renal; Thin       Discharge Diet:   Regular  Cardiac  Consistent Carbohydrate  Renal       Fluid Consistency:  Thin          Activity at Discharge:   Activity Instructions     Activity as Tolerated             Follow-up Appointments:   1. PCP 1 week  2. Dialysis Monday, Wednesday, Friday as scheduled  Future Appointments  Date Time Provider Department Center   2/21/2019 1:00 PM [facility]   2/21/2019 1:30 PM Ayana Dunn APRN [facility] None     ELI Geller  09/07/18  4:45 PM    Time: Discharge planning and teaching took greater than 30 minutes.

## 2018-11-01 ENCOUNTER — INPATIENT (INPATIENT)
Facility: HOSPITAL | Age: 83
LOS: 1 days | Discharge: HOME | End: 2018-11-03
Attending: HOSPITALIST | Admitting: HOSPITALIST
Payer: MEDICARE

## 2018-11-01 VITALS
HEART RATE: 77 BPM | OXYGEN SATURATION: 98 % | RESPIRATION RATE: 18 BRPM | SYSTOLIC BLOOD PRESSURE: 104 MMHG | TEMPERATURE: 98 F | DIASTOLIC BLOOD PRESSURE: 58 MMHG

## 2018-11-01 DIAGNOSIS — Z98.41 CATARACT EXTRACTION STATUS, RIGHT EYE: Chronic | ICD-10-CM

## 2018-11-01 LAB
ALBUMIN SERPL ELPH-MCNC: 3.4 G/DL — SIGNIFICANT CHANGE UP (ref 3.3–5)
ALP SERPL-CCNC: 203 U/L — HIGH (ref 40–120)
ALT FLD-CCNC: 31 U/L — SIGNIFICANT CHANGE UP (ref 4–41)
APPEARANCE UR: CLEAR — SIGNIFICANT CHANGE UP
AST SERPL-CCNC: 63 U/L — HIGH (ref 4–40)
BACTERIA # UR AUTO: NEGATIVE — SIGNIFICANT CHANGE UP
BASOPHILS # BLD AUTO: 0.02 K/UL — SIGNIFICANT CHANGE UP (ref 0–0.2)
BASOPHILS NFR BLD AUTO: 0.4 % — SIGNIFICANT CHANGE UP (ref 0–2)
BILIRUB SERPL-MCNC: 0.4 MG/DL — SIGNIFICANT CHANGE UP (ref 0.2–1.2)
BILIRUB UR-MCNC: NEGATIVE — SIGNIFICANT CHANGE UP
BLOOD UR QL VISUAL: NEGATIVE — SIGNIFICANT CHANGE UP
BUN SERPL-MCNC: 17 MG/DL — SIGNIFICANT CHANGE UP (ref 7–23)
CALCIUM SERPL-MCNC: 9 MG/DL — SIGNIFICANT CHANGE UP (ref 8.4–10.5)
CHLORIDE SERPL-SCNC: 96 MMOL/L — LOW (ref 98–107)
CO2 SERPL-SCNC: 26 MMOL/L — SIGNIFICANT CHANGE UP (ref 22–31)
COLOR SPEC: YELLOW — SIGNIFICANT CHANGE UP
CREAT SERPL-MCNC: 1.15 MG/DL — SIGNIFICANT CHANGE UP (ref 0.5–1.3)
EOSINOPHIL # BLD AUTO: 0.17 K/UL — SIGNIFICANT CHANGE UP (ref 0–0.5)
EOSINOPHIL NFR BLD AUTO: 3.7 % — SIGNIFICANT CHANGE UP (ref 0–6)
GLUCOSE SERPL-MCNC: 100 MG/DL — HIGH (ref 70–99)
GLUCOSE UR-MCNC: NEGATIVE — SIGNIFICANT CHANGE UP
HCT VFR BLD CALC: 34.8 % — LOW (ref 39–50)
HGB BLD-MCNC: 11.5 G/DL — LOW (ref 13–17)
HYALINE CASTS # UR AUTO: NEGATIVE — SIGNIFICANT CHANGE UP
IMM GRANULOCYTES # BLD AUTO: 0.01 # — SIGNIFICANT CHANGE UP
IMM GRANULOCYTES NFR BLD AUTO: 0.2 % — SIGNIFICANT CHANGE UP (ref 0–1.5)
KETONES UR-MCNC: NEGATIVE — SIGNIFICANT CHANGE UP
LEUKOCYTE ESTERASE UR-ACNC: NEGATIVE — SIGNIFICANT CHANGE UP
LIDOCAIN IGE QN: 32.2 U/L — SIGNIFICANT CHANGE UP (ref 7–60)
LYMPHOCYTES # BLD AUTO: 0.98 K/UL — LOW (ref 1–3.3)
LYMPHOCYTES # BLD AUTO: 21.4 % — SIGNIFICANT CHANGE UP (ref 13–44)
MAGNESIUM SERPL-MCNC: 2 MG/DL — SIGNIFICANT CHANGE UP (ref 1.6–2.6)
MCHC RBC-ENTMCNC: 31 PG — SIGNIFICANT CHANGE UP (ref 27–34)
MCHC RBC-ENTMCNC: 33 % — SIGNIFICANT CHANGE UP (ref 32–36)
MCV RBC AUTO: 93.8 FL — SIGNIFICANT CHANGE UP (ref 80–100)
MONOCYTES # BLD AUTO: 0.46 K/UL — SIGNIFICANT CHANGE UP (ref 0–0.9)
MONOCYTES NFR BLD AUTO: 10 % — SIGNIFICANT CHANGE UP (ref 2–14)
NEUTROPHILS # BLD AUTO: 2.94 K/UL — SIGNIFICANT CHANGE UP (ref 1.8–7.4)
NEUTROPHILS NFR BLD AUTO: 64.3 % — SIGNIFICANT CHANGE UP (ref 43–77)
NITRITE UR-MCNC: NEGATIVE — SIGNIFICANT CHANGE UP
NRBC # FLD: 0 — SIGNIFICANT CHANGE UP
PH UR: 6 — SIGNIFICANT CHANGE UP (ref 5–8)
PHOSPHATE SERPL-MCNC: 2.9 MG/DL — SIGNIFICANT CHANGE UP (ref 2.5–4.5)
PLATELET # BLD AUTO: 230 K/UL — SIGNIFICANT CHANGE UP (ref 150–400)
PMV BLD: 10.3 FL — SIGNIFICANT CHANGE UP (ref 7–13)
POTASSIUM SERPL-MCNC: 3.9 MMOL/L — SIGNIFICANT CHANGE UP (ref 3.5–5.3)
POTASSIUM SERPL-SCNC: 3.9 MMOL/L — SIGNIFICANT CHANGE UP (ref 3.5–5.3)
PROT SERPL-MCNC: 7.5 G/DL — SIGNIFICANT CHANGE UP (ref 6–8.3)
PROT UR-MCNC: 30 — SIGNIFICANT CHANGE UP
RBC # BLD: 3.71 M/UL — LOW (ref 4.2–5.8)
RBC # FLD: 13.8 % — SIGNIFICANT CHANGE UP (ref 10.3–14.5)
RBC CASTS # UR COMP ASSIST: SIGNIFICANT CHANGE UP (ref 0–?)
SODIUM SERPL-SCNC: 136 MMOL/L — SIGNIFICANT CHANGE UP (ref 135–145)
SP GR SPEC: 1.02 — SIGNIFICANT CHANGE UP (ref 1–1.04)
SQUAMOUS # UR AUTO: SIGNIFICANT CHANGE UP
TROPONIN T, HIGH SENSITIVITY: 45 NG/L — SIGNIFICANT CHANGE UP (ref ?–14)
TROPONIN T, HIGH SENSITIVITY: 47 NG/L — SIGNIFICANT CHANGE UP (ref ?–14)
UROBILINOGEN FLD QL: NORMAL — SIGNIFICANT CHANGE UP
WBC # BLD: 4.58 K/UL — SIGNIFICANT CHANGE UP (ref 3.8–10.5)
WBC # FLD AUTO: 4.58 K/UL — SIGNIFICANT CHANGE UP (ref 3.8–10.5)
WBC UR QL: SIGNIFICANT CHANGE UP (ref 0–?)

## 2018-11-01 PROCEDURE — 74177 CT ABD & PELVIS W/CONTRAST: CPT | Mod: 26

## 2018-11-01 RX ORDER — ONDANSETRON 8 MG/1
4 TABLET, FILM COATED ORAL ONCE
Qty: 0 | Refills: 0 | Status: COMPLETED | OUTPATIENT
Start: 2018-11-01 | End: 2018-11-01

## 2018-11-01 RX ADMIN — ONDANSETRON 4 MILLIGRAM(S): 8 TABLET, FILM COATED ORAL at 18:28

## 2018-11-01 NOTE — ED PROVIDER NOTE - PMH
ASHD (arteriosclerotic heart disease)    BPH (benign prostatic hyperplasia)    CVA (cerebral infarction)    Hepatitis  per son, contracted hepatitis from tattoo he received in his teens - unsure which type  [place] (hard of hearing)  right hearing aid  HTN (hypertension)    Hyperlipidemia    Neoplasm of bladder

## 2018-11-01 NOTE — ED PROVIDER NOTE - MEDICAL DECISION MAKING DETAILS
Pt with nausea x 1 week, not tolerating PO. Firm mass in RUQ. Concern for malignancy, possible obstruction. Pt also with reported CP this morning - low suspicion for ACS but will get EKG and troponin. Plan: labs, UA, CT, EKG, symptomatic treatment, reassess.

## 2018-11-01 NOTE — ED PROVIDER NOTE - PHYSICAL EXAMINATION
Gen: Well appearing, well nourished, A&Ox4, NAD.  ENMT: Airway patent. Slightly dry mucous membranes.  Cardiac: Normal rate, regular rhythm.  Heart sounds S1, S2.  Respiratory: Velcro-like crackles b/l lung bases.  Abdomen: Abdomen soft, non-distended, non-tender, no guarding. +firm non-tender mass in RUQ.  Musculoskeletal: Atraumatic. No vascular compromise. No CVAT.  Neuro: Alert, follows commands. Speech is clear, fluent, and appropriate. No apparent neuro deficit.  Skin: Skin normal color for race, warm, dry and intact. No evidence of rash.

## 2018-11-01 NOTE — ED PROVIDER NOTE - CARE PLAN
Principal Discharge DX:	Liver mass  Secondary Diagnosis:	Calculus of gallbladder without cholecystitis without obstruction

## 2018-11-01 NOTE — ED ADULT NURSE NOTE - OBJECTIVE STATEMENT
Patient received AA&Ox3 c/o decreased PO intake and nausea x1wk with weight loss x1yr. VSS on RA. Patient denies chest pain, SOB, fever, chills, dyspnea, dizziness, abdominal pain at this time. 20g PIV in place to left AC, labs drawn, medication administered per orders, NAD noted - will continue to monitor.

## 2018-11-01 NOTE — ED PROVIDER NOTE - OBJECTIVE STATEMENT
89M h/o HTN, HLD, bladder neoplasm, CHF on lasix p/w nausea x 1 week, worse with food intake, a/w decreased appetite. Pt not tolerating PO. Has been losing weight gradually x 1 year. No fever, chills, SOB, cough, vomiting, diarrhea, or dysuria. Pt denies CP but had some chest pain this morning as per son. 89M h/o HTN, HLD, bladder neoplasm, CHF on lasix p/w nausea x 1 week, worse with food intake, a/w decreased appetite. Pt not tolerating PO. Has been losing weight gradually x 1 year. No fever, chills, SOB, cough, vomiting, diarrhea, or dysuria. Pt denies CP but had some chest pain this morning as per son.  PMD: Eric Soni

## 2018-11-01 NOTE — ED PROVIDER NOTE - PROGRESS NOTE DETAILS
Evan Storm (pt's son) can be reached at [phone] Trops returned negative. Pt says he is hungry and wants to eat but pending CT read before allowing. CT shows Multifocal ill-defined infiltrative peripherally enhancing lesion centered in the inferior liver with considerations including primary hcc or mets. contrast MRI with liver protocol is recommended for further evaluation. Markedly distended gallbladder containing multiple calcified gallstones with wall thickening. Consider HIDA scan for further evaluation. Heterogeneous enlarged prostate.

## 2018-11-01 NOTE — ED ADULT NURSE REASSESSMENT NOTE - NS ED NURSE REASSESS COMMENT FT1
pt received alert and oriented x3. respirations equal and unlabored. nsr on cm. no active n/v at this moment. pt denies any abd pain. abd soft and nontender. Call bell in reach, warm blanket provided, bed in lowest position, side rails up x2,safety maintained. will continue to monitor.

## 2018-11-01 NOTE — ED ADULT TRIAGE NOTE - CHIEF COMPLAINT QUOTE
Pt c/o feeling nauseous x few days, unable to tolerate PO intake. Denies any c/o pain. As per son, pt c/o chest pain this morning. Denies chest pain currently. Pt appears comfortable at this time.

## 2018-11-02 ENCOUNTER — TRANSCRIPTION ENCOUNTER (OUTPATIENT)
Age: 83
End: 2018-11-02

## 2018-11-02 DIAGNOSIS — Z29.9 ENCOUNTER FOR PROPHYLACTIC MEASURES, UNSPECIFIED: ICD-10-CM

## 2018-11-02 DIAGNOSIS — N40.0 BENIGN PROSTATIC HYPERPLASIA WITHOUT LOWER URINARY TRACT SYMPTOMS: ICD-10-CM

## 2018-11-02 DIAGNOSIS — R16.0 HEPATOMEGALY, NOT ELSEWHERE CLASSIFIED: ICD-10-CM

## 2018-11-02 DIAGNOSIS — K80.20 CALCULUS OF GALLBLADDER WITHOUT CHOLECYSTITIS WITHOUT OBSTRUCTION: ICD-10-CM

## 2018-11-02 DIAGNOSIS — I50.9 HEART FAILURE, UNSPECIFIED: ICD-10-CM

## 2018-11-02 DIAGNOSIS — E78.5 HYPERLIPIDEMIA, UNSPECIFIED: ICD-10-CM

## 2018-11-02 PROCEDURE — 99223 1ST HOSP IP/OBS HIGH 75: CPT | Mod: GC,AI

## 2018-11-02 RX ORDER — FUROSEMIDE 40 MG
40 TABLET ORAL
Qty: 0 | Refills: 0 | Status: DISCONTINUED | OUTPATIENT
Start: 2018-11-02 | End: 2018-11-03

## 2018-11-02 RX ORDER — ATORVASTATIN CALCIUM 80 MG/1
10 TABLET, FILM COATED ORAL AT BEDTIME
Qty: 0 | Refills: 0 | Status: DISCONTINUED | OUTPATIENT
Start: 2018-11-02 | End: 2018-11-03

## 2018-11-02 RX ORDER — METOPROLOL TARTRATE 50 MG
12.5 TABLET ORAL
Qty: 0 | Refills: 0 | Status: DISCONTINUED | OUTPATIENT
Start: 2018-11-02 | End: 2018-11-02

## 2018-11-02 RX ORDER — LOSARTAN POTASSIUM 100 MG/1
50 TABLET, FILM COATED ORAL DAILY
Qty: 0 | Refills: 0 | Status: DISCONTINUED | OUTPATIENT
Start: 2018-11-02 | End: 2018-11-02

## 2018-11-02 RX ORDER — SODIUM CHLORIDE 9 MG/ML
1000 INJECTION INTRAMUSCULAR; INTRAVENOUS; SUBCUTANEOUS ONCE
Qty: 0 | Refills: 0 | Status: COMPLETED | OUTPATIENT
Start: 2018-11-02 | End: 2018-11-02

## 2018-11-02 RX ORDER — METOPROLOL TARTRATE 50 MG
12.5 TABLET ORAL
Qty: 0 | Refills: 0 | Status: DISCONTINUED | OUTPATIENT
Start: 2018-11-02 | End: 2018-11-03

## 2018-11-02 RX ORDER — ASPIRIN/CALCIUM CARB/MAGNESIUM 324 MG
81 TABLET ORAL DAILY
Qty: 0 | Refills: 0 | Status: DISCONTINUED | OUTPATIENT
Start: 2018-11-02 | End: 2018-11-03

## 2018-11-02 RX ORDER — LOSARTAN POTASSIUM 100 MG/1
1 TABLET, FILM COATED ORAL
Qty: 0 | Refills: 0 | COMMUNITY

## 2018-11-02 RX ORDER — LOSARTAN POTASSIUM 100 MG/1
50 TABLET, FILM COATED ORAL DAILY
Qty: 0 | Refills: 0 | Status: DISCONTINUED | OUTPATIENT
Start: 2018-11-02 | End: 2018-11-03

## 2018-11-02 RX ORDER — POTASSIUM CHLORIDE 20 MEQ
10 PACKET (EA) ORAL
Qty: 0 | Refills: 0 | COMMUNITY

## 2018-11-02 RX ORDER — FINASTERIDE 5 MG/1
5 TABLET, FILM COATED ORAL DAILY
Qty: 0 | Refills: 0 | Status: DISCONTINUED | OUTPATIENT
Start: 2018-11-02 | End: 2018-11-03

## 2018-11-02 RX ADMIN — LOSARTAN POTASSIUM 50 MILLIGRAM(S): 100 TABLET, FILM COATED ORAL at 11:48

## 2018-11-02 RX ADMIN — Medication 81 MILLIGRAM(S): at 11:48

## 2018-11-02 RX ADMIN — FINASTERIDE 5 MILLIGRAM(S): 5 TABLET, FILM COATED ORAL at 11:48

## 2018-11-02 RX ADMIN — SODIUM CHLORIDE 2000 MILLILITER(S): 9 INJECTION INTRAMUSCULAR; INTRAVENOUS; SUBCUTANEOUS at 02:34

## 2018-11-02 RX ADMIN — ATORVASTATIN CALCIUM 10 MILLIGRAM(S): 80 TABLET, FILM COATED ORAL at 22:10

## 2018-11-02 NOTE — H&P ADULT - PMH
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

## 2018-11-02 NOTE — H&P ADULT - HISTORY OF PRESENT ILLNESS
Pt is a 89M with hx of HTN, HLD, bladder neoplasm (s/p resection 10/2016), CHF (EF 10% 2/2018, on lasix) presenting with nausea, decreased appetite and PO intake for 5 days. Pt is severely hard of hearing.    States he ate clams that his son told him were bad, ate them anyway. Has felt nauseated for last 5 days, tried to induce vomiting and had abdominal pain following, otherwise no vomiting or pain. Has had decreased appetite since nausea started. No fever, chest pain, difficulty breathing, diarrhea, constipation, dysuria.

## 2018-11-02 NOTE — DISCHARGE NOTE ADULT - HOSPITAL COURSE
HPI:   Pt is a 89M with hx of HTN, HLD, bladder neoplasm (s/p resection 10/2016), CHF (EF 10% 2/2018, on lasix) presenting with nausea, decreased appetite and PO intake for 5 days. Pt is severely hard of hearing.  States he ate clams that his son told him were bad, ate them anyway. Has felt nauseated for last 5 days, tried to induce vomiting and had abdominal pain following, otherwise no vomiting or pain. Has had decreased appetite since nausea started. No fever, chest pain, difficulty breathing, diarrhea, constipation, dysuria.   Course:  Pt was afebrile, had WBC wnl, and no abdominal pain or nausea on admission, making acute cholecystitis unlikely. CTAP found new liver mass and markedly enlarged gallbladder. Pt was additionally found to have elevated alk phos and AST and MRI was ordered to further evaluate liver mass. HPI:   Pt is a 89M with hx of HTN, HLD, bladder neoplasm (s/p resection 10/2016), CHF (EF 10% 2/2018, on lasix) presenting with nausea, decreased appetite and PO intake for 5 days. Pt is severely hard of hearing.  States he ate clams that his son told him were bad, ate them anyway. Has felt nauseated for last 5 days, tried to induce vomiting and had abdominal pain following, otherwise no vomiting or pain. Has had decreased appetite since nausea started. No fever, chest pain, difficulty breathing, diarrhea, constipation, dysuria.     Course:  Pt was afebrile, had WBC wnl, and no abdominal pain or nausea on admission, making acute cholecystitis unlikely. CTAP found new liver mass and markedly enlarged gallbladder. Pt was additionally found to have elevated alk phos and AST and MRI was ordered to further evaluate liver mass. AFP came back positive, hepatitis panel was negative. 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 Family was counseled regarding diagnosis of liver lesion that is most likely malignant though advised that final diagnosis cannot be made without a biopsy. Family wishing to take patient home at this time. Per physical therapy evaluation, patient has no needs. Information for oncology follow up provided to the patient family. Patient is hemodynamically stable for discharge home.

## 2018-11-02 NOTE — DISCHARGE NOTE ADULT - MEDICATION SUMMARY - MEDICATIONS TO TAKE
I will START or STAY ON the medications listed below when I get home from the hospital:    Proscar 5 mg oral tablet  -- 1 tab(s) by mouth once a day  -- Indication: For BPH (benign prostatic hyperplasia)    aspirin 81 mg oral tablet  -- 1 tab(s) by mouth once a day  -- Indication: For CVA    losartan 50 mg oral tablet  -- 1 tab(s) by mouth once a day  -- Indication: For Heart failure    Lipitor 10 mg oral tablet  -- 1 tab(s) by mouth once a day (at bedtime)  -- Indication: For Hyperlipidemia    metoprolol tartrate 25 mg oral tablet  -- 0.5 tab(s) by mouth 2 times a day MDD:1  -- Indication: For Heart failure    furosemide 40 mg oral tablet  -- 1 tab(s) by mouth 2 times a day MDD:2  -- Indication: For Heart failure    Multiple Vitamins oral tablet  -- 1 tab(s) by mouth once a day  -- Indication: For Preventive measure I will START or STAY ON the medications listed below when I get home from the hospital:    Proscar 5 mg oral tablet  -- 1 tab(s) by mouth once a day  -- Indication: For BPH (benign prostatic hyperplasia)    aspirin 81 mg oral tablet  -- 1 tab(s) by mouth once a day  -- Indication: For CVA    losartan 50 mg oral tablet  -- 1 tab(s) by mouth once a day  -- Indication: For Heart failure    Zofran ODT 4 mg oral tablet, disintegrating  -- 1 tab(s) by mouth 3 times a day, As Needed   -- Indication: For nausea or vomiting    Lipitor 10 mg oral tablet  -- 1 tab(s) by mouth once a day (at bedtime)  -- Indication: For Hyperlipidemia    metoprolol tartrate 25 mg oral tablet  -- 0.5 tab(s) by mouth 2 times a day MDD:1  -- Indication: For Heart failure    furosemide 40 mg oral tablet  -- 1 tab(s) by mouth 2 times a day MDD:2  -- Indication: For Heart failure    Multiple Vitamins oral tablet  -- 1 tab(s) by mouth once a day  -- Indication: For Preventive measure

## 2018-11-02 NOTE — DISCHARGE NOTE ADULT - SECONDARY DIAGNOSIS.
Hyperlipidemia Heart failure Calculus of gallbladder without cholecystitis without obstruction BPH (benign prostatic hyperplasia)

## 2018-11-02 NOTE — H&P ADULT - ASSESSMENT
89M with hx of bladder neoplasm (s/p resection 2016) presenting with nausea, found to have RUQ mass, enlarged gallbladder and liver lesions on CTAP, elevated alk phos and AST concerning for metastatic disease vs new neoplasm.

## 2018-11-02 NOTE — H&P ADULT - NSHPSOCIALHISTORY_GEN_ALL_CORE
Pt lives at home with wife, has home health aide 7 days/week, son and daughter-in-law very involved. No tobacco, EtOH, recreational drug use.

## 2018-11-02 NOTE — DISCHARGE NOTE ADULT - PLAN OF CARE
Identification of liver mass You were found to have a new mass in your liver that was evaluated with CT and MRI. Continue your home medications Based on your symptoms, you were found not to have an infection of the galbladder. You were found to have a new mass in your liver that was evaluated with CT and MRI. The mass appears to be malignant  - either primarily from liver or could be from other site in the body. You may follow up with your PMD and/or oncologist for further evaluation if you wish. Please continue to take atorvastatin Please continue to take losartan, metoprolol and furosemide at home. Based on your symptoms, you were found not to have an infection of the galbladder. You do have gallstones though, and if you develop severe pain in the right side of your abdomen, come back to the ED or call your PMD. Continue to take finasteride for your bph

## 2018-11-02 NOTE — H&P ADULT - PROBLEM SELECTOR PLAN 1
New multifocal ill-defined peripherally enhancing mass on CTAP. Alk phos 203, AST 63. Possible hx of hepatitis.  - MRI to further evaluate mass  - AFP  - Hepatic panel

## 2018-11-02 NOTE — DISCHARGE NOTE ADULT - ADDITIONAL INSTRUCTIONS
Please follow up with your PMD Dr. Soni within 1 wk of discharge.   If you wish to follow up with oncology for further information or management of your liver nodules, please call [phone] to make an appointment. This is the [facility] number located at [address] [place] [address] Please follow up with your PMD Dr. Soni within 1 wk of discharge.   If you wish to follow up with oncology for further information or management of your liver nodules, please call [phone] to make an appointment. This is the [facility] number located at [address] [facility].  If it is difficult for you to keep seeing PMD in [place], you may establish care with a doctor in [place]. One recommendation is [facility] (next available MD, can suggest Dr. Manuel Mehta) at [facility] [place] - [phone] [place] [address]   Phone #[phone]

## 2018-11-02 NOTE — PHYSICAL THERAPY INITIAL EVALUATION ADULT - PERTINENT HX OF CURRENT PROBLEM, REHAB EVAL
This is a 89M with hx of bladder neoplasm (s/p resection 2016) presenting with nausea, found to have RUQ mass, enlarged gallbladder and liver lesions on CTAP, elevated alk phos and AST concerning for metastatic disease vs new neoplasm.

## 2018-11-02 NOTE — DISCHARGE NOTE ADULT - CARE PLAN
Principal Discharge DX:	Liver mass  Goal:	Identification of liver mass  Assessment and plan of treatment:	You were found to have a new mass in your liver that was evaluated with CT and MRI.  Secondary Diagnosis:	Hyperlipidemia  Assessment and plan of treatment:	Continue your home medications  Secondary Diagnosis:	Heart failure  Assessment and plan of treatment:	Continue your home medications  Secondary Diagnosis:	Calculus of gallbladder without cholecystitis without obstruction  Assessment and plan of treatment:	Based on your symptoms, you were found not to have an infection of the galbladder.  Secondary Diagnosis:	BPH (benign prostatic hyperplasia)  Assessment and plan of treatment:	Continue your home medications Principal Discharge DX:	Liver mass  Goal:	Identification of liver mass  Assessment and plan of treatment:	You were found to have a new mass in your liver that was evaluated with CT and MRI. The mass appears to be malignant  - either primarily from liver or could be from other site in the body. You may follow up with your PMD and/or oncologist for further evaluation if you wish.  Secondary Diagnosis:	Hyperlipidemia  Assessment and plan of treatment:	Please continue to take atorvastatin  Secondary Diagnosis:	Heart failure  Assessment and plan of treatment:	Please continue to take losartan, metoprolol and furosemide at home.  Secondary Diagnosis:	Calculus of gallbladder without cholecystitis without obstruction  Assessment and plan of treatment:	Based on your symptoms, you were found not to have an infection of the galbladder. You do have gallstones though, and if you develop severe pain in the right side of your abdomen, come back to the ED or call your PMD.  Secondary Diagnosis:	BPH (benign prostatic hyperplasia)  Assessment and plan of treatment:	Continue to take finasteride for your bph

## 2018-11-02 NOTE — DISCHARGE NOTE ADULT - OTHER SIGNIFICANT FINDINGS
< from: CT Abdomen and Pelvis w/ IV Cont (11.01.18 @ 21:42) >    IMPRESSION:     Multifocal ill-defined infiltrative peripherally enhancing lesion   centered in the inferior liver involving the right greater than left   hepatic lobes. Considerations include primary hepatocellular carcinoma or   secondary malignancy. Abscesses included in the differential in the   appropriate clinical setting. Further evaluation with contrast MRI with   liver protocol is recommended for further evaluation.     Markedly distended gallbladder containing multiple calcified gallstones   with wall thickening may reflect cholecystitis in the clinical setting.   Consider HIDA scan for further evaluation.    Heterogeneous enlarged prostate. Correlate with PSA.    Changes from pulmonary fibrosis.    < end of copied text >    < from: MR Abdomen w/wo IV Cont (11.03.18 @ 12:54) >  FINDINGS:    LOWER CHEST: Within normal limits.    LIVER: Scattered cysts. Innumerable enhancing masses in the liver, with a   dominant heterogeneous mass spanning right upper and inferior lobes.   There is diffuse associated restricted diffusion.  BILE DUCTS: Intrahepatic biliary tree is dilated. The common hepatic duct   is not visualized. CBD is normal.  GALLBLADDER: Markedly distended and contains stones.  SPLEEN: Within normal limits.  PANCREAS: Within normal limits.  ADRENALS: Within normal limits.  KIDNEYS/URETERS: Bilateral cysts.    VISUALIZED PORTIONS:    BOWEL: Within normal limits.   PERITONEUM: No ascites.  VESSELS: Within normal limits.  RETROPERITONEUM: New adenopathy.    ABDOMINAL WALL: Anasarca.  BONES: Within normal limits.    IMPRESSION: Findings in the liver are most likely secondary to multifocal   HCC or metastatic disease with retroperitoneal adenopathy.  Extensively distended gallbladder with stones. Question of Mirizzi   syndrome with mild dilatation of the intrahepatic biliary tree and   nonvisualization of common hepatic duct.    < end of copied text >

## 2018-11-02 NOTE — H&P ADULT - PROBLEM SELECTOR PLAN 2
Markedly enlarged gallbladder with mild wall thickening on CTAP. Alk phos 203. WBC wnl, afebrile, no abdominal pain. Cholecystitis unlikely given clinical presentation.  - Trend CMP Markedly enlarged gallbladder with mild wall thickening on CTAP. Alk phos 203. WBC wnl, afebrile, no abdominal pain. Cholecystitis unlikely given clinical presentation.  - F/u MRI  - Trend CMP

## 2018-11-02 NOTE — H&P ADULT - ATTENDING COMMENTS
Patient seen and examined. Chart/lab reviewed. Agree with above with modifications.  89M with hx of chronic HFrEF 10%, bladder neoplasm (s/p resection 2016) p/w nausea, found to have liver lesions and dilated GB w/ cholelithiasis, denies ruq/epigastric pain.     PE: nontoxic, pt is hard of hearing, lung clear, heart regular, abdomen nontender, palpable GB, no leg edema    Assessment/plan:  # elevated LFT due to Liver lesions: r/o mets vs. hepatoma vs. abscess, check MRI abdomen with liver protocol, AFP and hepatitis panel  # chronic HFrEF: appear euvolemic, cont cardiac meds  # dispo: d/c home, pending MRI and further delineation of liver lesions

## 2018-11-02 NOTE — DISCHARGE NOTE ADULT - PATIENT PORTAL LINK FT
You can access the [facility], offered by [facility], by registering with the following website: [email or web address]

## 2018-11-02 NOTE — H&P ADULT - PROBLEM SELECTOR PLAN 3
EF 10% 2/2018. Seen outpatient by Dr. Houston.  - Continue furosemide, losartan, metoprolol EF 10% 2/2018. Seen outpatient by Dr. Houston.  - Continue furosemide, losartan, metoprolol, aspirin

## 2018-11-02 NOTE — DISCHARGE NOTE ADULT - CARE PROVIDER_API CALL
Eric Soni), Gastroenterology; Internal Medicine  [address] [place] [address]  Phone: [phone]  Fax: [phone]

## 2018-11-03 VITALS
TEMPERATURE: 98 F | HEART RATE: 78 BPM | DIASTOLIC BLOOD PRESSURE: 62 MMHG | OXYGEN SATURATION: 100 % | SYSTOLIC BLOOD PRESSURE: 103 MMHG | RESPIRATION RATE: 17 BRPM

## 2018-11-03 LAB
AFP-TM SERPL-MCNC: 20.1 NG/ML — HIGH
BACTERIA UR CULT: SIGNIFICANT CHANGE UP
HAV IGM SER-ACNC: NONREACTIVE — SIGNIFICANT CHANGE UP
HBV CORE IGM SER-ACNC: NONREACTIVE — SIGNIFICANT CHANGE UP
HBV SURFACE AG SER-ACNC: NONREACTIVE — SIGNIFICANT CHANGE UP
HCT VFR BLD CALC: 32.6 % — LOW (ref 39–50)
HCV AB S/CO SERPL IA: 0.16 S/CO — SIGNIFICANT CHANGE UP
HCV AB SERPL-IMP: SIGNIFICANT CHANGE UP
HGB BLD-MCNC: 10.4 G/DL — LOW (ref 13–17)
MCHC RBC-ENTMCNC: 30.2 PG — SIGNIFICANT CHANGE UP (ref 27–34)
MCHC RBC-ENTMCNC: 31.9 % — LOW (ref 32–36)
MCV RBC AUTO: 94.8 FL — SIGNIFICANT CHANGE UP (ref 80–100)
NRBC # FLD: 0 — SIGNIFICANT CHANGE UP
PLATELET # BLD AUTO: 194 K/UL — SIGNIFICANT CHANGE UP (ref 150–400)
PMV BLD: 10.1 FL — SIGNIFICANT CHANGE UP (ref 7–13)
RBC # BLD: 3.44 M/UL — LOW (ref 4.2–5.8)
RBC # FLD: 13.9 % — SIGNIFICANT CHANGE UP (ref 10.3–14.5)
SPECIMEN SOURCE: SIGNIFICANT CHANGE UP
WBC # BLD: 3.96 K/UL — SIGNIFICANT CHANGE UP (ref 3.8–10.5)
WBC # FLD AUTO: 3.96 K/UL — SIGNIFICANT CHANGE UP (ref 3.8–10.5)

## 2018-11-03 PROCEDURE — 99239 HOSP IP/OBS DSCHRG MGMT >30: CPT

## 2018-11-03 PROCEDURE — 74183 MRI ABD W/O CNTR FLWD CNTR: CPT | Mod: 26

## 2018-11-03 RX ORDER — ONDANSETRON 8 MG/1
1 TABLET, FILM COATED ORAL
Qty: 42 | Refills: 0 | OUTPATIENT
Start: 2018-11-03 | End: 2018-11-16

## 2018-11-03 RX ADMIN — Medication 12.5 MILLIGRAM(S): at 06:28

## 2018-11-03 RX ADMIN — FINASTERIDE 5 MILLIGRAM(S): 5 TABLET, FILM COATED ORAL at 11:52

## 2018-11-03 RX ADMIN — Medication 81 MILLIGRAM(S): at 11:52

## 2018-11-03 RX ADMIN — Medication 40 MILLIGRAM(S): at 17:59

## 2018-11-03 RX ADMIN — Medication 40 MILLIGRAM(S): at 06:29

## 2018-11-03 RX ADMIN — LOSARTAN POTASSIUM 50 MILLIGRAM(S): 100 TABLET, FILM COATED ORAL at 06:29

## 2018-11-03 RX ADMIN — Medication 12.5 MILLIGRAM(S): at 17:59

## 2018-11-03 NOTE — PROGRESS NOTE ADULT - SUBJECTIVE AND OBJECTIVE BOX
Bina Milner, PGY3  Pager [phone] /[phone]    Patient is a 89y old  Male who presents with a chief complaint of FTT, liver mass (2018 15:24)      SUBJECTIVE / OVERNIGHT EVENTS: Pt seen and examined at bedside. He states he feels well. He has occasional nausea but no abdominal pain.     MEDICATIONS  (STANDING):  aspirin enteric coated 81 milliGRAM(s) Oral daily  atorvastatin 10 milliGRAM(s) Oral at bedtime  finasteride 5 milliGRAM(s) Oral daily  furosemide    Tablet 40 milliGRAM(s) Oral two times a day  losartan 50 milliGRAM(s) Oral daily  metoprolol tartrate 12.5 milliGRAM(s) Oral two times a day    MEDICATIONS  (PRN):      CAPILLARY BLOOD GLUCOSE        I&O's Summary      PHYSICAL EXAM:  GENERAL: NAD  HEENT: Head atraumatic, normocephalic, VERY hard of hearing   CHEST/LUNG: Clear to auscultation bilaterally, no crackles, rhonchi or wheezing   HEART: Regular rate and rhythm, no murmurs, rubs, or gallops  ABDOMEN: Soft, nontender, nondistended, normal bowel sounds   ++ 3cm round mass on abdomen in RUQ    EXTREMITIES:  2+ Peripheral pulses, no clubbing, cyanosis, or edema  PSYCH: AAOx3  NEUROLOGY: No focal deficits   SKIN: No rashes or lesions    LABS:                        10.4   3.96  )-----------( 194      ( 2018 06:00 )             32.6     11-    136  |  96<L>  |  17  ----------------------------<  100<H>  3.9   |  26  |  1.15    Ca    9.0      2018 18:20  Phos  2.9       Mg     2.0         TPro  7.5  /  Alb  3.4  /  TBili  0.4  /  DBili  x   /  AST  63<H>  /  ALT  31  /  AlkPhos  203<H>            Urinalysis Basic - ( 2018 18:20 )    Color: YELLOW / Appearance: CLEAR / S.021 / pH: 6.0  Gluc: NEGATIVE / Ketone: NEGATIVE  / Bili: NEGATIVE / Urobili: NORMAL   Blood: NEGATIVE / Protein: 30 / Nitrite: NEGATIVE   Leuk Esterase: NEGATIVE / RBC: 0-2 / WBC 0-2   Sq Epi: OCC / Non Sq Epi: x / Bacteria: NEGATIVE        RADIOLOGY & ADDITIONAL TESTS:    Imaging Personally Reviewed:   < from: MR Abdomen w/wo IV Cont (18 @ 12:54) >  IMPRESSION: Findings in the liver are most likely secondary to multifocal   HCC or metastatic disease with retroperitoneal adenopathy.  Extensively distended gallbladder with stones. Question of Mirizzi   syndrome with mild dilatation of the intrahepatic biliary tree and   nonvisualization of common hepatic duct.    < end of copied text >      Consultant(s) Notes Reviewed:      Care Discussed with Consultants/Other Providers:

## 2018-11-03 NOTE — PROGRESS NOTE ADULT - PROBLEM SELECTOR PLAN 6
DVT PPX: improve score 1-2. ICD's as pt is walking and high risk for bleeding   Diet: regular  Dispo: pending

## 2018-11-03 NOTE — PROGRESS NOTE ADULT - PROBLEM SELECTOR PLAN 3
EF 10% 2/2018. Seen outpatient by Dr. Houston.  - Continue furosemide, losartan, metoprolol, aspirin

## 2018-11-03 NOTE — PROGRESS NOTE ADULT - PROBLEM SELECTOR PLAN 1
New multifocal ill-defined peripherally enhancing mass on CTAP. Alk phos 203, AST 63.   - AFP positive concerning for hepatocellular carcinoma  - MRI concerning for malignancy - primary vs mets   - Hepatic panel negative

## 2018-11-03 NOTE — PROGRESS NOTE ADULT - PROBLEM SELECTOR PLAN 2
Markedly enlarged gallbladder with mild wall thickening on CTAP. Alk phos 203. WBC wnl, afebrile, no abdominal pain. Cholecystitis unlikely - likely related to malignancy

## 2018-11-03 NOTE — PROGRESS NOTE ADULT - ASSESSMENT
89M with hx of bladder neoplasm (s/p resection 2016) presenting with nausea, found to have RUQ mass, enlarged gallbladder and liver lesions on CTAP, elevated alk phos and AST concerning for metastatic disease vs primary hepatic neoplasm.

## 2018-11-03 NOTE — PROGRESS NOTE ADULT - ATTENDING COMMENTS
Patient seen and examined. Chart/lab reviewed. Agree with above with modifications.  89M with hx of chronic HFrEF 10%, bladder neoplasm (s/p resection 2016) p/w nausea, found to have liver lesions and dilated GB w/ cholelithiasis, denies ruq/epigastric pain.     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

## 2018-11-03 NOTE — CHART NOTE - NSCHARTNOTEFT_GEN_A_CORE
Patient's son, Evan, is at bedside. I explained the results of the MRI to the son. Patient likely has either primary liver cancer (likely hepatocellular carcinoma as has elevated AFP) versus metastatic disease with unknown primary. I explained the possibility of either further evaluation with oncology consult and possibly more imaging to visualize if cancer is spread to brain/lungs versus taking the patient home and having an outpatient follow up/workup.  During this conversation, patient could not hear the diagnosis as he has severe presbycusis Patient's son, Evan, is at bedside. I explained the results of the MRI to the son. Patient likely has either primary liver cancer (likely hepatocellular carcinoma as has elevated AFP) versus metastatic disease with unknown primary. I explained the possibility of either further evaluation with oncology consult and possibly more imaging to visualize if cancer is spread to brain/lungs versus taking the patient home and having an outpatient follow up/workup.  During this conversation, patient could not hear the diagnosis as he has severe presbycusis. Son asked me to not reveal the diagnosis to the patient who currently has full capacity.     I asked the patient if he was aware of what was going on to which he responded no. I asked him if he wanted to know about the imaging findings, and he said he wanted me to talk to his son about his hospital stay and that the son should make the decisions. Diagnosis was not revealed to the patient as a result.     Initially the son Evan requested further oncology evaluation for prognostic purposes. I also offered home hospice evaluation option as one of his concerns was regarding the lack of help with his father's care at home including not having an aid. The son agreed to both oncology and hospice evaluation. However, subsequently, he changed his mind and wished to take his father home.  I will prepare discharge and provide outpatient oncology information.       Bina Milner MD   PGY 3

## 2018-11-06 ENCOUNTER — OTHER (OUTPATIENT)
Age: 83
End: 2018-11-06

## 2018-11-12 ENCOUNTER — APPOINTMENT (OUTPATIENT)
Dept: INTERNAL MEDICINE | Facility: CLINIC | Age: 83
End: 2018-11-12

## 2018-11-26 NOTE — ED PROVIDER NOTE - OTHER FINDINGS
Quality 431: Preventive Care And Screening: Unhealthy Alcohol Use - Screening: Patient screened for unhealthy alcohol use using a single question and scores less than 2 times per year
Detail Level: Detailed
Quality 226: Preventive Care And Screening: Tobacco Use: Screening And Cessation Intervention: Patient screened for tobacco use and is an ex/non-smoker
Quality 130: Documentation Of Current Medications In The Medical Record: Current Medications Documented
non-specific ST changes

## 2019-02-08 NOTE — ED ADULT NURSE NOTE - NSIMPLEMENTINTERV_GEN_ALL_ED
Spontaneous, unlabored and symmetrical Implemented All Fall with Harm Risk Interventions:  [place] to call system. Call bell, personal items and telephone within reach. Instruct patient to call for assistance. Room bathroom lighting operational. Non-slip footwear when patient is off stretcher. Physically safe environment: no spills, clutter or unnecessary equipment. Stretcher in lowest position, wheels locked, appropriate side rails in place. Provide visual cue, wrist band, yellow gown, etc. Monitor gait and stability. Monitor for mental status changes and reorient to person, place, and time. Review medications for side effects contributing to fall risk. Reinforce activity limits and safety measures with patient and family. Provide visual clues: red socks.

## 2019-02-28 NOTE — ED ADULT NURSE NOTE - PAIN RATING/NUMBER SCALE (0-10): REST
Patient does not have regular physical exercise.  She does not have any specific diet to lose weight.  However she tries to eat more vegetables and tries to eat less carbohydrate.  Patient reported that her weight increased during wintertime as she does not have exercise.   3

## 2020-01-14 NOTE — H&P ADULT - NSHPLANGLIMITEDENGLISH_GEN_A_CORE
No Bill For Individual Tests Below?: no Venipuncture Paragraph: An alcohol pad was applied to the venipuncture site. Venipuncture was performed using a butterfly needle. Pressure and a bandaid was applied to the site. No complications were noted. Number Of Tubes Drawn: 3 Detail Level: None

## 2020-01-31 NOTE — DISCHARGE NOTE ADULT - HOSPITAL COURSE
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 Pt. received one dose of abx, but concern for CAP was low as pt. afebrile without white count, so was observed off antibiotics. A CT chest was done which showed ______. Of note pt. was noted to have a RUQ abdominal mass by his PCP Dr. Soni but had never had a CTAP done. 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 Pt. received one dose of abx, but concern for CAP was low as pt. afebrile without white count, so was observed off antibiotics. A CT chest was done which showed pulmonary fibrosis and pulmonary edema. Pts. symptoms improved with starting lasix. An MRI of the abdomen was done while showed _____.    Pt. is stable and safe for d/c home with follow up with his primary care doctor and pulmonology. 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 Pt. received one dose of abx, but concern for CAP was low as pt. afebrile without white count, so was observed off antibiotics. A CT chest was done which showed pulmonary fibrosis and pulmonary edema. An MRI of the abdomen to evaluate for abdominal mass while showed _____.    Pt. is stable and safe for d/c home with follow up with his primary care doctor and pulmonology. 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 Pt. received one dose of abx, but concern for CAP was low as pt. afebrile without white count, so was observed off antibiotics. A CT chest was done which showed pulmonary fibrosis and pulmonary edema. Patient euvolemic after receiving lasix IV x 2. 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 No additional right upper quadrant mass is identified. Pt. is stable and safe for d/c home with follow up with his primary care doctor and pulmonology.

## 2020-07-18 NOTE — ED PROVIDER NOTE - ATTENDING CONTRIBUTION TO CARE
clear
89yo hx HF, HTN, HLD, BPH, Bladder CA, CVA presents with weakness and shortness of breath. States symptoms are present for months but worsening over the last week. + PRITCHARD. Also admits to weight loss 15 lbs over the last few months w decreased appetite. Patient also noticed increasing bilateral lower extremity swelling for the past few weeks. +orthopnea. Denies chest pain, palp. Pt saw his PCP 2 weeks ago placed on lasix wo significant improvement.   On exam, Patient is awake,alert,oriented x 3. Patient is well appearing w mild tachypnea hypoxi  92-93 improves w 2LNC. Patient's chest w b/l rales, +s1s2. Abdomen is soft nd/nt +BS. Extremity with b/l 2+ edema.   Check labs, xray chest, likely related to HF. IV diuresis and monitor closely.

## 2020-08-28 NOTE — PATIENT PROFILE ADULT. - FALL HARM RISK CONCLUSION
Fall with Harm Risk

## 2021-08-18 NOTE — DISCHARGE NOTE ADULT - BECAUSE OF A PHYSICAL, MENTAL OR EMOTIONAL CONDITION, DO YOU HAVE DIFFICULTY DOING  ERRANDS ALONE LIKE VISITING A DOCTOR'S OFFICE OR SHOPPING (15 YEARS AND OLDER)
[FreeTextEntry1] : 7/7/21: 61 year old man with history of Peyronie's disease s/p xiaflex injections complicated by tunical rupture requiring penile exploration and repair of tunical rupture on 7/4/21. The injury was dorsal and required mobilization of dorsal neurovascular bundle. No complications since surgery. He has had some oozing of blood from suture line and progression of bruising without major hematoma. No fevers, chills, dysuria, hematuria, frequency or urgency.\par \par 8/18/21: Presents for follow up after penile fracture repair. His circumcision has healed well. All sutures have fallen out. No fevers, chills, erythema or drainage. He had foul smelling urine and dark urine that has resolved. No dysuria. He has had weak morning erections, but has not tried to have an erection or any sexual activity.
Yes

## 2022-07-11 NOTE — ED ADULT NURSE NOTE - BED ASSIGNMENT RECEIVED
You can access the FollowMyHealth Patient Portal offered by [facility] by registering at the following website: [email or web address]. By joining [employer]’s FollowMyHealth portal, you will also be able to view your health information using other applications (apps) compatible with our system.
00:00

## 2022-09-15 NOTE — ED PROVIDER NOTE - PSH
S/P cataract surgery, right    S/P tonsillectomy    S/P TURP (status post transurethral resection of prostate) 85

## 2022-10-18 NOTE — DISCHARGE NOTE ADULT - CONDITIONS AT DISCHARGE
A&Ox4. Pt is calm, pleasant on approach, cooperative and compliant with tx. VSS. Afebrile. Denies chest pain/SOB.  Pt denies pain/discomfort. Denies nausea/vomitting. Tolerating diet. Pt medically cleared for d/c home. Hydroquinone Counseling:  Patient advised that medication may result in skin irritation, lightening (hypopigmentation), dryness, and burning.  In the event of skin irritation, the patient was advised to reduce the amount of the drug applied or use it less frequently.  Rarely, spots that are treated with hydroquinone can become darker (pseudoochronosis).  Should this occur, patient instructed to stop medication and call the office. The patient verbalized understanding of the proper use and possible adverse effects of hydroquinone.  All of the patient's questions and concerns were addressed.

## 2022-12-01 NOTE — PHYSICAL THERAPY INITIAL EVALUATION ADULT - RANGE OF MOTION EXAMINATION, REHAB EVAL
No bilateral lower extremity ROM was WFL (within functional limits)/bilateral upper extremity ROM was WFL (within functional limits)

## 2022-12-27 NOTE — PATIENT PROFILE ADULT. - SOCIAL CONCERNS
A (CATHETER 5FR STRGT PGTL CRV 110CM LG INNER LUM 6 SH RADOPQ) catheter was used to selectively engage and inject the abdominal aorta by hand injection. None

## 2023-08-25 NOTE — H&P ADULT. - PROBLEM SELECTOR PLAN 1
Addended by: DAVID CORRIGAN on: 8/25/2023 12:00 PM     Modules accepted: Orders     Patient with multifocal pneumonia; unlikely CHF though elevated BNP  check echocardiogram  s/p azithromycin, aztreonam;  will continue with aztreonam and levquin for now  f/u blood culture  check sputum culture  repeat CXR in 1-2 days Patient with multifocal pneumonia; unlikely CHF though elevated BNP  check echocardiogram  s/p azithromycin, aztreonam;  will continue with IV aztreonam and IV levaquin for now  f/u blood culture  check sputum culture  repeat CXR in 1-2 days

## 2024-04-11 NOTE — H&P ADULT - PSH
Instructions: This plan will send the code FBSE to the PM system.  DO NOT or CHANGE the price. Price (Do Not Change): 0.00 Detail Level: Simple S/P cataract surgery, right    S/P tonsillectomy    S/P TURP (status post transurethral resection of prostate)
